# Patient Record
Sex: FEMALE | Race: WHITE | NOT HISPANIC OR LATINO | Employment: OTHER | URBAN - METROPOLITAN AREA
[De-identification: names, ages, dates, MRNs, and addresses within clinical notes are randomized per-mention and may not be internally consistent; named-entity substitution may affect disease eponyms.]

---

## 2021-04-08 DIAGNOSIS — Z23 ENCOUNTER FOR IMMUNIZATION: ICD-10-CM

## 2023-05-24 ENCOUNTER — HOSPITAL ENCOUNTER (INPATIENT)
Facility: HOSPITAL | Age: 68
LOS: 6 days | Discharge: HOME HEALTH | DRG: 552 | End: 2023-05-31
Attending: EMERGENCY MEDICINE | Admitting: INTERNAL MEDICINE
Payer: MEDICARE

## 2023-05-24 DIAGNOSIS — R60.0 LOWER EXTREMITY EDEMA: ICD-10-CM

## 2023-05-24 DIAGNOSIS — N17.9 AKI (ACUTE KIDNEY INJURY) (HCC): ICD-10-CM

## 2023-05-24 DIAGNOSIS — Z98.1 STATUS POST CERVICAL SPINAL FUSION: ICD-10-CM

## 2023-05-24 DIAGNOSIS — N17.9 AKI (ACUTE KIDNEY INJURY) (HCC): Primary | ICD-10-CM

## 2023-05-24 DIAGNOSIS — R74.8 ELEVATED CK: ICD-10-CM

## 2023-05-24 DIAGNOSIS — M54.2 CERVICAL PAIN: ICD-10-CM

## 2023-05-24 DIAGNOSIS — R23.8 BULLAE: ICD-10-CM

## 2023-05-24 DIAGNOSIS — E11.29 TYPE 2 DIABETES MELLITUS WITH RENAL COMPLICATION (HCC): ICD-10-CM

## 2023-05-24 LAB
ALBUMIN SERPL BCP-MCNC: 2.9 G/DL (ref 3.5–5)
ALP SERPL-CCNC: 134 U/L (ref 46–116)
ALT SERPL W P-5'-P-CCNC: 41 U/L (ref 12–78)
ANION GAP SERPL CALCULATED.3IONS-SCNC: 4 MMOL/L (ref 4–13)
AST SERPL W P-5'-P-CCNC: 82 U/L (ref 5–45)
BASOPHILS # BLD AUTO: 0.03 THOUSANDS/ÂΜL (ref 0–0.1)
BASOPHILS NFR BLD AUTO: 0 % (ref 0–1)
BILIRUB SERPL-MCNC: 0.4 MG/DL (ref 0.2–1)
BUN SERPL-MCNC: 39 MG/DL (ref 5–25)
CALCIUM ALBUM COR SERPL-MCNC: 10.2 MG/DL (ref 8.3–10.1)
CALCIUM SERPL-MCNC: 9.3 MG/DL (ref 8.3–10.1)
CARDIAC TROPONIN I PNL SERPL HS: 5 NG/L
CHLORIDE SERPL-SCNC: 104 MMOL/L (ref 96–108)
CO2 SERPL-SCNC: 24 MMOL/L (ref 21–32)
CREAT SERPL-MCNC: 1.62 MG/DL (ref 0.6–1.3)
D DIMER PPP FEU-MCNC: 2.64 UG/ML FEU
EOSINOPHIL # BLD AUTO: 0.21 THOUSAND/ÂΜL (ref 0–0.61)
EOSINOPHIL NFR BLD AUTO: 3 % (ref 0–6)
ERYTHROCYTE [DISTWIDTH] IN BLOOD BY AUTOMATED COUNT: 15.4 % (ref 11.6–15.1)
GFR SERPL CREATININE-BSD FRML MDRD: 32 ML/MIN/1.73SQ M
GLUCOSE SERPL-MCNC: 147 MG/DL (ref 65–140)
HCT VFR BLD AUTO: 29.9 % (ref 34.8–46.1)
HGB BLD-MCNC: 9.3 G/DL (ref 11.5–15.4)
IMM GRANULOCYTES # BLD AUTO: 0.08 THOUSAND/UL (ref 0–0.2)
IMM GRANULOCYTES NFR BLD AUTO: 1 % (ref 0–2)
LYMPHOCYTES # BLD AUTO: 1.32 THOUSANDS/ÂΜL (ref 0.6–4.47)
LYMPHOCYTES NFR BLD AUTO: 16 % (ref 14–44)
MCH RBC QN AUTO: 29 PG (ref 26.8–34.3)
MCHC RBC AUTO-ENTMCNC: 31.1 G/DL (ref 31.4–37.4)
MCV RBC AUTO: 93 FL (ref 82–98)
MONOCYTES # BLD AUTO: 0.69 THOUSAND/ÂΜL (ref 0.17–1.22)
MONOCYTES NFR BLD AUTO: 9 % (ref 4–12)
NEUTROPHILS # BLD AUTO: 5.82 THOUSANDS/ÂΜL (ref 1.85–7.62)
NEUTS SEG NFR BLD AUTO: 71 % (ref 43–75)
NRBC BLD AUTO-RTO: 0 /100 WBCS
PLATELET # BLD AUTO: 256 THOUSANDS/UL (ref 149–390)
PMV BLD AUTO: 11.1 FL (ref 8.9–12.7)
POTASSIUM SERPL-SCNC: 3.9 MMOL/L (ref 3.5–5.3)
PROT SERPL-MCNC: 6.9 G/DL (ref 6.4–8.4)
RBC # BLD AUTO: 3.21 MILLION/UL (ref 3.81–5.12)
SODIUM SERPL-SCNC: 132 MMOL/L (ref 135–147)
WBC # BLD AUTO: 8.44 THOUSAND/UL (ref 4.31–10.16)

## 2023-05-24 PROCEDURE — 99285 EMERGENCY DEPT VISIT HI MDM: CPT

## 2023-05-24 PROCEDURE — 84484 ASSAY OF TROPONIN QUANT: CPT | Performed by: STUDENT IN AN ORGANIZED HEALTH CARE EDUCATION/TRAINING PROGRAM

## 2023-05-24 PROCEDURE — 82550 ASSAY OF CK (CPK): CPT | Performed by: STUDENT IN AN ORGANIZED HEALTH CARE EDUCATION/TRAINING PROGRAM

## 2023-05-24 PROCEDURE — 85379 FIBRIN DEGRADATION QUANT: CPT | Performed by: STUDENT IN AN ORGANIZED HEALTH CARE EDUCATION/TRAINING PROGRAM

## 2023-05-24 PROCEDURE — 36415 COLL VENOUS BLD VENIPUNCTURE: CPT | Performed by: STUDENT IN AN ORGANIZED HEALTH CARE EDUCATION/TRAINING PROGRAM

## 2023-05-24 PROCEDURE — 93005 ELECTROCARDIOGRAM TRACING: CPT

## 2023-05-24 PROCEDURE — 96375 TX/PRO/DX INJ NEW DRUG ADDON: CPT

## 2023-05-24 PROCEDURE — 85025 COMPLETE CBC W/AUTO DIFF WBC: CPT | Performed by: STUDENT IN AN ORGANIZED HEALTH CARE EDUCATION/TRAINING PROGRAM

## 2023-05-24 PROCEDURE — 80053 COMPREHEN METABOLIC PANEL: CPT | Performed by: STUDENT IN AN ORGANIZED HEALTH CARE EDUCATION/TRAINING PROGRAM

## 2023-05-24 PROCEDURE — 83880 ASSAY OF NATRIURETIC PEPTIDE: CPT | Performed by: STUDENT IN AN ORGANIZED HEALTH CARE EDUCATION/TRAINING PROGRAM

## 2023-05-24 RX ORDER — HYDROMORPHONE HCL/PF 1 MG/ML
0.5 SYRINGE (ML) INJECTION ONCE
Status: COMPLETED | OUTPATIENT
Start: 2023-05-24 | End: 2023-05-24

## 2023-05-24 RX ADMIN — HYDROMORPHONE HYDROCHLORIDE 0.5 MG: 1 INJECTION, SOLUTION INTRAMUSCULAR; INTRAVENOUS; SUBCUTANEOUS at 22:57

## 2023-05-25 ENCOUNTER — APPOINTMENT (INPATIENT)
Dept: RADIOLOGY | Facility: HOSPITAL | Age: 68
DRG: 552 | End: 2023-05-25
Payer: MEDICARE

## 2023-05-25 ENCOUNTER — APPOINTMENT (EMERGENCY)
Dept: RADIOLOGY | Facility: HOSPITAL | Age: 68
DRG: 552 | End: 2023-05-25
Payer: MEDICARE

## 2023-05-25 ENCOUNTER — APPOINTMENT (INPATIENT)
Dept: NON INVASIVE DIAGNOSTICS | Facility: HOSPITAL | Age: 68
DRG: 552 | End: 2023-05-25
Payer: MEDICARE

## 2023-05-25 PROBLEM — I25.10 CORONARY ARTERY DISEASE WITHOUT ANGINA PECTORIS: Status: ACTIVE | Noted: 2023-05-25

## 2023-05-25 PROBLEM — R00.0 TACHYCARDIA: Status: ACTIVE | Noted: 2023-05-25

## 2023-05-25 PROBLEM — K21.9 ACID REFLUX: Status: ACTIVE | Noted: 2023-05-25

## 2023-05-25 PROBLEM — R79.89 ELEVATED D-DIMER: Status: ACTIVE | Noted: 2023-05-25

## 2023-05-25 PROBLEM — E11.29 TYPE 2 DIABETES MELLITUS WITH RENAL COMPLICATION (HCC): Status: ACTIVE | Noted: 2023-05-25

## 2023-05-25 PROBLEM — M54.2 CERVICAL PAIN: Status: ACTIVE | Noted: 2023-05-25

## 2023-05-25 PROBLEM — R60.0 LOWER EXTREMITY EDEMA: Status: ACTIVE | Noted: 2023-05-25

## 2023-05-25 PROBLEM — D64.9 ANEMIA: Status: ACTIVE | Noted: 2023-05-25

## 2023-05-25 PROBLEM — E87.1 HYPONATREMIA: Status: ACTIVE | Noted: 2023-05-25

## 2023-05-25 PROBLEM — K59.00 CONSTIPATION: Status: ACTIVE | Noted: 2023-05-25

## 2023-05-25 PROBLEM — N17.9 AKI (ACUTE KIDNEY INJURY) (HCC): Status: ACTIVE | Noted: 2023-05-25

## 2023-05-25 PROBLEM — I25.2 HISTORY OF MI (MYOCARDIAL INFARCTION): Status: ACTIVE | Noted: 2023-05-25

## 2023-05-25 LAB
2HR DELTA HS TROPONIN: 0 NG/L
4HR DELTA HS TROPONIN: 1 NG/L
ANION GAP SERPL CALCULATED.3IONS-SCNC: 3 MMOL/L (ref 4–13)
ATRIAL RATE: 107 BPM
BNP SERPL-MCNC: 14 PG/ML (ref 0–100)
BUN SERPL-MCNC: 34 MG/DL (ref 5–25)
CALCIUM SERPL-MCNC: 9 MG/DL (ref 8.3–10.1)
CARDIAC TROPONIN I PNL SERPL HS: 5 NG/L
CARDIAC TROPONIN I PNL SERPL HS: 6 NG/L
CHLORIDE SERPL-SCNC: 107 MMOL/L (ref 96–108)
CK SERPL-CCNC: 1358 U/L (ref 26–192)
CO2 SERPL-SCNC: 22 MMOL/L (ref 21–32)
CREAT SERPL-MCNC: 1.33 MG/DL (ref 0.6–1.3)
GFR SERPL CREATININE-BSD FRML MDRD: 41 ML/MIN/1.73SQ M
GLUCOSE SERPL-MCNC: 124 MG/DL (ref 65–140)
GLUCOSE SERPL-MCNC: 138 MG/DL (ref 65–140)
GLUCOSE SERPL-MCNC: 141 MG/DL (ref 65–140)
GLUCOSE SERPL-MCNC: 145 MG/DL (ref 65–140)
GLUCOSE SERPL-MCNC: 149 MG/DL (ref 65–140)
P AXIS: 45 DEGREES
POTASSIUM SERPL-SCNC: 4.4 MMOL/L (ref 3.5–5.3)
PR INTERVAL: 152 MS
QRS AXIS: 31 DEGREES
QRSD INTERVAL: 60 MS
QT INTERVAL: 308 MS
QTC INTERVAL: 411 MS
SODIUM SERPL-SCNC: 132 MMOL/L (ref 135–147)
T WAVE AXIS: 53 DEGREES
VENTRICULAR RATE: 107 BPM

## 2023-05-25 PROCEDURE — 36415 COLL VENOUS BLD VENIPUNCTURE: CPT | Performed by: STUDENT IN AN ORGANIZED HEALTH CARE EDUCATION/TRAINING PROGRAM

## 2023-05-25 PROCEDURE — 96365 THER/PROPH/DIAG IV INF INIT: CPT

## 2023-05-25 PROCEDURE — 99223 1ST HOSP IP/OBS HIGH 75: CPT | Performed by: PHYSICIAN ASSISTANT

## 2023-05-25 PROCEDURE — G1004 CDSM NDSC: HCPCS

## 2023-05-25 PROCEDURE — 99223 1ST HOSP IP/OBS HIGH 75: CPT | Performed by: INTERNAL MEDICINE

## 2023-05-25 PROCEDURE — 82948 REAGENT STRIP/BLOOD GLUCOSE: CPT

## 2023-05-25 PROCEDURE — 80048 BASIC METABOLIC PNL TOTAL CA: CPT | Performed by: NURSE PRACTITIONER

## 2023-05-25 PROCEDURE — 96366 THER/PROPH/DIAG IV INF ADDON: CPT

## 2023-05-25 PROCEDURE — 84484 ASSAY OF TROPONIN QUANT: CPT | Performed by: STUDENT IN AN ORGANIZED HEALTH CARE EDUCATION/TRAINING PROGRAM

## 2023-05-25 PROCEDURE — 97167 OT EVAL HIGH COMPLEX 60 MIN: CPT

## 2023-05-25 PROCEDURE — 71275 CT ANGIOGRAPHY CHEST: CPT

## 2023-05-25 PROCEDURE — 93010 ELECTROCARDIOGRAM REPORT: CPT | Performed by: INTERNAL MEDICINE

## 2023-05-25 PROCEDURE — 93970 EXTREMITY STUDY: CPT

## 2023-05-25 PROCEDURE — 96376 TX/PRO/DX INJ SAME DRUG ADON: CPT

## 2023-05-25 PROCEDURE — NC001 PR NO CHARGE: Performed by: PHYSICIAN ASSISTANT

## 2023-05-25 PROCEDURE — 97163 PT EVAL HIGH COMPLEX 45 MIN: CPT

## 2023-05-25 PROCEDURE — 99232 SBSQ HOSP IP/OBS MODERATE 35: CPT | Performed by: INTERNAL MEDICINE

## 2023-05-25 PROCEDURE — 93970 EXTREMITY STUDY: CPT | Performed by: SURGERY

## 2023-05-25 PROCEDURE — 72040 X-RAY EXAM NECK SPINE 2-3 VW: CPT

## 2023-05-25 RX ORDER — HYDROMORPHONE HCL/PF 1 MG/ML
0.5 SYRINGE (ML) INJECTION EVERY 4 HOURS PRN
Status: DISCONTINUED | OUTPATIENT
Start: 2023-05-25 | End: 2023-05-25

## 2023-05-25 RX ORDER — GLIMEPIRIDE 4 MG/1
8 TABLET ORAL
COMMUNITY

## 2023-05-25 RX ORDER — ATORVASTATIN CALCIUM 40 MG/1
40 TABLET, FILM COATED ORAL DAILY
COMMUNITY

## 2023-05-25 RX ORDER — HYDROMORPHONE HYDROCHLORIDE 2 MG/1
4 TABLET ORAL EVERY 4 HOURS PRN
Status: DISCONTINUED | OUTPATIENT
Start: 2023-05-25 | End: 2023-05-31 | Stop reason: HOSPADM

## 2023-05-25 RX ORDER — ISOSORBIDE MONONITRATE 30 MG/1
30 TABLET, EXTENDED RELEASE ORAL DAILY
COMMUNITY

## 2023-05-25 RX ORDER — BACLOFEN 10 MG/1
10 TABLET ORAL 2 TIMES DAILY PRN
COMMUNITY
End: 2023-05-31

## 2023-05-25 RX ORDER — DOCUSATE SODIUM 100 MG/1
100 CAPSULE, LIQUID FILLED ORAL 2 TIMES DAILY
Status: DISCONTINUED | OUTPATIENT
Start: 2023-05-25 | End: 2023-05-31 | Stop reason: HOSPADM

## 2023-05-25 RX ORDER — ASPIRIN 81 MG/1
81 TABLET, CHEWABLE ORAL DAILY
COMMUNITY
End: 2023-05-31

## 2023-05-25 RX ORDER — PIOGLITAZONEHYDROCHLORIDE 30 MG/1
30 TABLET ORAL DAILY
COMMUNITY

## 2023-05-25 RX ORDER — ATORVASTATIN CALCIUM 40 MG/1
40 TABLET, FILM COATED ORAL DAILY
Status: DISCONTINUED | OUTPATIENT
Start: 2023-05-25 | End: 2023-05-31 | Stop reason: HOSPADM

## 2023-05-25 RX ORDER — GABAPENTIN 100 MG/1
100 CAPSULE ORAL 3 TIMES DAILY
Status: DISCONTINUED | OUTPATIENT
Start: 2023-05-25 | End: 2023-05-30

## 2023-05-25 RX ORDER — HYDROMORPHONE HCL/PF 1 MG/ML
0.5 SYRINGE (ML) INJECTION ONCE
Status: COMPLETED | OUTPATIENT
Start: 2023-05-25 | End: 2023-05-25

## 2023-05-25 RX ORDER — OXYBUTYNIN CHLORIDE 5 MG/1
5 TABLET ORAL 2 TIMES DAILY
COMMUNITY

## 2023-05-25 RX ORDER — HYDROMORPHONE HYDROCHLORIDE 2 MG/1
2 TABLET ORAL EVERY 4 HOURS PRN
Status: DISCONTINUED | OUTPATIENT
Start: 2023-05-25 | End: 2023-05-31 | Stop reason: HOSPADM

## 2023-05-25 RX ORDER — ACETAMINOPHEN 325 MG/1
975 TABLET ORAL EVERY 8 HOURS SCHEDULED
Status: DISCONTINUED | OUTPATIENT
Start: 2023-05-25 | End: 2023-05-31 | Stop reason: HOSPADM

## 2023-05-25 RX ORDER — INSULIN LISPRO 100 [IU]/ML
2-12 INJECTION, SOLUTION INTRAVENOUS; SUBCUTANEOUS
Status: DISCONTINUED | OUTPATIENT
Start: 2023-05-25 | End: 2023-05-31 | Stop reason: HOSPADM

## 2023-05-25 RX ORDER — SODIUM CHLORIDE, SODIUM GLUCONATE, SODIUM ACETATE, POTASSIUM CHLORIDE, MAGNESIUM CHLORIDE, SODIUM PHOSPHATE, DIBASIC, AND POTASSIUM PHOSPHATE .53; .5; .37; .037; .03; .012; .00082 G/100ML; G/100ML; G/100ML; G/100ML; G/100ML; G/100ML; G/100ML
75 INJECTION, SOLUTION INTRAVENOUS CONTINUOUS
Status: DISCONTINUED | OUTPATIENT
Start: 2023-05-25 | End: 2023-05-26

## 2023-05-25 RX ORDER — HYDROMORPHONE HCL/PF 1 MG/ML
0.5 SYRINGE (ML) INJECTION EVERY 2 HOUR PRN
Status: DISCONTINUED | OUTPATIENT
Start: 2023-05-25 | End: 2023-05-26

## 2023-05-25 RX ORDER — LIDOCAINE 50 MG/G
1 PATCH TOPICAL DAILY
Status: DISCONTINUED | OUTPATIENT
Start: 2023-05-25 | End: 2023-05-31 | Stop reason: HOSPADM

## 2023-05-25 RX ORDER — HYDROMORPHONE HCL/PF 1 MG/ML
1 SYRINGE (ML) INJECTION ONCE AS NEEDED
Status: DISCONTINUED | OUTPATIENT
Start: 2023-05-25 | End: 2023-05-25

## 2023-05-25 RX ORDER — ALLOPURINOL 100 MG/1
200 TABLET ORAL DAILY
Status: DISCONTINUED | OUTPATIENT
Start: 2023-05-25 | End: 2023-05-31 | Stop reason: HOSPADM

## 2023-05-25 RX ORDER — LISINOPRIL 2.5 MG/1
2.5 TABLET ORAL DAILY
COMMUNITY

## 2023-05-25 RX ORDER — PANTOPRAZOLE SODIUM 40 MG/1
40 TABLET, DELAYED RELEASE ORAL DAILY
Status: DISCONTINUED | OUTPATIENT
Start: 2023-05-25 | End: 2023-05-31 | Stop reason: HOSPADM

## 2023-05-25 RX ORDER — POLYETHYLENE GLYCOL 3350 17 G/17G
17 POWDER, FOR SOLUTION ORAL DAILY
Status: DISCONTINUED | OUTPATIENT
Start: 2023-05-25 | End: 2023-05-31 | Stop reason: HOSPADM

## 2023-05-25 RX ORDER — HYDROMORPHONE HCL/PF 1 MG/ML
1 SYRINGE (ML) INJECTION ONCE
Status: COMPLETED | OUTPATIENT
Start: 2023-05-25 | End: 2023-05-25

## 2023-05-25 RX ORDER — ALLOPURINOL 100 MG/1
200 TABLET ORAL DAILY
COMMUNITY

## 2023-05-25 RX ORDER — OXYBUTYNIN CHLORIDE 5 MG/1
5 TABLET ORAL 2 TIMES DAILY
Status: DISCONTINUED | OUTPATIENT
Start: 2023-05-25 | End: 2023-05-31 | Stop reason: HOSPADM

## 2023-05-25 RX ORDER — ISOSORBIDE MONONITRATE 30 MG/1
30 TABLET, EXTENDED RELEASE ORAL DAILY
Status: DISCONTINUED | OUTPATIENT
Start: 2023-05-25 | End: 2023-05-31 | Stop reason: HOSPADM

## 2023-05-25 RX ORDER — SODIUM CHLORIDE, SODIUM GLUCONATE, SODIUM ACETATE, POTASSIUM CHLORIDE, MAGNESIUM CHLORIDE, SODIUM PHOSPHATE, DIBASIC, AND POTASSIUM PHOSPHATE .53; .5; .37; .037; .03; .012; .00082 G/100ML; G/100ML; G/100ML; G/100ML; G/100ML; G/100ML; G/100ML
500 INJECTION, SOLUTION INTRAVENOUS ONCE
Status: COMPLETED | OUTPATIENT
Start: 2023-05-25 | End: 2023-05-25

## 2023-05-25 RX ORDER — METHOCARBAMOL 500 MG/1
500 TABLET, FILM COATED ORAL EVERY 6 HOURS SCHEDULED
Status: DISCONTINUED | OUTPATIENT
Start: 2023-05-25 | End: 2023-05-31 | Stop reason: HOSPADM

## 2023-05-25 RX ORDER — PANTOPRAZOLE SODIUM 40 MG/1
40 TABLET, DELAYED RELEASE ORAL DAILY
COMMUNITY

## 2023-05-25 RX ADMIN — GABAPENTIN 100 MG: 100 CAPSULE ORAL at 11:33

## 2023-05-25 RX ADMIN — HYDROMORPHONE HYDROCHLORIDE 1 MG: 1 INJECTION, SOLUTION INTRAMUSCULAR; INTRAVENOUS; SUBCUTANEOUS at 05:45

## 2023-05-25 RX ADMIN — LIDOCAINE 5% 1 PATCH: 700 PATCH TOPICAL at 11:34

## 2023-05-25 RX ADMIN — ACETAMINOPHEN 975 MG: 325 TABLET ORAL at 22:05

## 2023-05-25 RX ADMIN — ATORVASTATIN CALCIUM 40 MG: 40 TABLET, FILM COATED ORAL at 17:20

## 2023-05-25 RX ADMIN — HYDROMORPHONE HYDROCHLORIDE 4 MG: 2 TABLET ORAL at 22:42

## 2023-05-25 RX ADMIN — OXYBUTYNIN CHLORIDE 5 MG: 5 TABLET ORAL at 17:20

## 2023-05-25 RX ADMIN — POLYETHYLENE GLYCOL 3350 17 G: 17 POWDER, FOR SOLUTION ORAL at 09:32

## 2023-05-25 RX ADMIN — ACETAMINOPHEN 975 MG: 325 TABLET ORAL at 14:44

## 2023-05-25 RX ADMIN — GABAPENTIN 100 MG: 100 CAPSULE ORAL at 22:05

## 2023-05-25 RX ADMIN — DOCUSATE SODIUM 100 MG: 100 CAPSULE, LIQUID FILLED ORAL at 17:20

## 2023-05-25 RX ADMIN — DOCUSATE SODIUM 100 MG: 100 CAPSULE, LIQUID FILLED ORAL at 09:38

## 2023-05-25 RX ADMIN — HYDROMORPHONE HYDROCHLORIDE 0.5 MG: 1 INJECTION, SOLUTION INTRAMUSCULAR; INTRAVENOUS; SUBCUTANEOUS at 02:25

## 2023-05-25 RX ADMIN — METHOCARBAMOL TABLETS 500 MG: 500 TABLET, COATED ORAL at 23:01

## 2023-05-25 RX ADMIN — METHOCARBAMOL TABLETS 500 MG: 500 TABLET, COATED ORAL at 17:20

## 2023-05-25 RX ADMIN — IOHEXOL 90 ML: 350 INJECTION, SOLUTION INTRAVENOUS at 01:20

## 2023-05-25 RX ADMIN — METHOCARBAMOL TABLETS 500 MG: 500 TABLET, COATED ORAL at 11:34

## 2023-05-25 RX ADMIN — OXYBUTYNIN CHLORIDE 5 MG: 5 TABLET ORAL at 09:39

## 2023-05-25 RX ADMIN — ALLOPURINOL 200 MG: 100 TABLET ORAL at 17:20

## 2023-05-25 RX ADMIN — SODIUM CHLORIDE, SODIUM GLUCONATE, SODIUM ACETATE, POTASSIUM CHLORIDE, MAGNESIUM CHLORIDE, SODIUM PHOSPHATE, DIBASIC, AND POTASSIUM PHOSPHATE 75 ML/HR: .53; .5; .37; .037; .03; .012; .00082 INJECTION, SOLUTION INTRAVENOUS at 09:34

## 2023-05-25 RX ADMIN — SODIUM CHLORIDE, SODIUM GLUCONATE, SODIUM ACETATE, POTASSIUM CHLORIDE, MAGNESIUM CHLORIDE, SODIUM PHOSPHATE, DIBASIC, AND POTASSIUM PHOSPHATE 500 ML: .53; .5; .37; .037; .03; .012; .00082 INJECTION, SOLUTION INTRAVENOUS at 00:09

## 2023-05-25 RX ADMIN — SODIUM CHLORIDE, SODIUM GLUCONATE, SODIUM ACETATE, POTASSIUM CHLORIDE, MAGNESIUM CHLORIDE, SODIUM PHOSPHATE, DIBASIC, AND POTASSIUM PHOSPHATE 500 ML: .53; .5; .37; .037; .03; .012; .00082 INJECTION, SOLUTION INTRAVENOUS at 00:50

## 2023-05-25 RX ADMIN — METOPROLOL TARTRATE 25 MG: 25 TABLET, FILM COATED ORAL at 22:05

## 2023-05-25 RX ADMIN — PANTOPRAZOLE SODIUM 40 MG: 40 TABLET, DELAYED RELEASE ORAL at 09:38

## 2023-05-25 RX ADMIN — ISOSORBIDE MONONITRATE 30 MG: 30 TABLET, EXTENDED RELEASE ORAL at 09:38

## 2023-05-25 RX ADMIN — GABAPENTIN 100 MG: 100 CAPSULE ORAL at 17:20

## 2023-05-25 RX ADMIN — METOPROLOL TARTRATE 25 MG: 25 TABLET, FILM COATED ORAL at 09:38

## 2023-05-25 RX ADMIN — HYDROMORPHONE HYDROCHLORIDE 4 MG: 2 TABLET ORAL at 09:39

## 2023-05-25 RX ADMIN — HYDROMORPHONE HYDROCHLORIDE 0.5 MG: 1 INJECTION, SOLUTION INTRAMUSCULAR; INTRAVENOUS; SUBCUTANEOUS at 11:34

## 2023-05-25 NOTE — PLAN OF CARE
Problem: Potential for Falls  Goal: Patient will remain free of falls  Description: INTERVENTIONS:  - Educate patient/family on patient safety including physical limitations  - Instruct patient to call for assistance with activity   - Consult OT/PT to assist with strengthening/mobility   - Keep Call bell within reach  - Keep bed low and locked with side rails adjusted as appropriate  - Keep care items and personal belongings within reach  - Initiate and maintain comfort rounds  - Make Fall Risk Sign visible to staff  - Offer Toileting every *2** Hours, in advance of need  - Initiate/Maintain **bed/chair*alarm  - Obtain necessary fall risk management equipment:  - Apply yellow socks and bracelet for high fall risk patients  - Consider moving patient to room near nurses station  Outcome: Progressing     Problem: NEUROSENSORY - ADULT  Goal: Achieves maximal functionality and self care  Description: INTERVENTIONS  - Monitor swallowing and airway patency with patient fatigue and changes in neurological status  - Encourage and assist patient to increase activity and self care     - Encourage visually impaired, hearing impaired and aphasic patients to use assistive/communication devices  Outcome: Progressing     Problem: CARDIOVASCULAR - ADULT  Goal: Absence of cardiac dysrhythmias or at baseline rhythm  Description: INTERVENTIONS:  - Continuous cardiac monitoring, vital signs, obtain 12 lead EKG if ordered  - Administer antiarrhythmic and heart rate control medications as ordered  - Monitor electrolytes and administer replacement therapy as ordered  Outcome: Progressing     Problem: METABOLIC, FLUID AND ELECTROLYTES - ADULT  Goal: Electrolytes maintained within normal limits  Description: INTERVENTIONS:  - Monitor labs and assess patient for signs and symptoms of electrolyte imbalances  - Administer electrolyte replacement as ordered  - Monitor response to electrolyte replacements, including repeat lab results as appropriate  - Instruct patient on fluid and nutrition as appropriate  Outcome: Progressing  Goal: Fluid balance maintained  Description: INTERVENTIONS:  - Monitor labs   - Monitor I/O and WT  - Instruct patient on fluid and nutrition as appropriate  - Assess for signs & symptoms of volume excess or deficit  Outcome: Progressing  Goal: Glucose maintained within target range  Description: INTERVENTIONS:  - Monitor Blood Glucose as ordered  - Assess for signs and symptoms of hyperglycemia and hypoglycemia  - Administer ordered medications to maintain glucose within target range  - Assess nutritional intake and initiate nutrition service referral as needed  Outcome: Progressing     Problem: HEMATOLOGIC - ADULT  Goal: Maintains hematologic stability  Description: INTERVENTIONS  - Assess for signs and symptoms of bleeding or hemorrhage  - Monitor labs  - Administer supportive blood products/factors as ordered and appropriate  Outcome: Progressing     Problem: MUSCULOSKELETAL - ADULT  Goal: Maintain or return mobility to safest level of function  Description: INTERVENTIONS:  - Assess patient's ability to carry out ADLs; assess patient's baseline for ADL function and identify physical deficits which impact ability to perform ADLs (bathing, care of mouth/teeth, toileting, grooming, dressing, etc )  - Assess/evaluate cause of self-care deficits   - Assess range of motion  - Assess patient's mobility  - Assess patient's need for assistive devices and provide as appropriate  - Encourage maximum independence but intervene and supervise when necessary  - Involve family in performance of ADLs  - Assess for home care needs following discharge   - Consider OT consult to assist with ADL evaluation and planning for discharge  - Provide patient education as appropriate  Outcome: Progressing  Goal: Maintain proper alignment of affected body part  Description: INTERVENTIONS:  - Support, maintain and protect limb and body alignment  - Provide patient/ family with appropriate education  Outcome: Progressing     Problem: SKIN/TISSUE INTEGRITY - ADULT  Goal: Skin Integrity remains intact(Skin Breakdown Prevention)  Description: Assess:  -Perform Miguel assessment -Clean and moisturize skin -Inspect skin when repositioning, toileting, and assisting with ADLS  -Assess under medical devices -Assess extremities for adequate circulation and sensation     Bed Management:  -Have minimal linens on bed & keep smooth, unwrinkled  -Change linens as needed when moist or perspiring  -Avoid sitting or lying in one position for more than *2** hours while in bed  -Keep HOB at *30**degrees     Toileting:  -Offer bedside commode  -Assess for incontinence   -Use incontinent care products after each incontinent episode     Activity:  -Mobilize patient *3** times a day  -Encourage activity and walks on unit  -Encourage or provide ROM exercises   -Turn and reposition patient every *2** Hours  -Use appropriate equipment to lift or move patient in bed  -Instruct/ Assist with weight shifting every *20 mins** when out of bed in chair  -Consider limitation of chair time *2** hour intervals    Skin Care:  -Avoid use of baby powder, tape, friction and shearing, hot water or constrictive clothing  -Relieve pressure over bony prominences -Do not massage red bony areas    Next Steps:  -Teach patient strategies to minimize risks-Consider consults to  interdisciplinary teams Outcome: Progressing  Goal: Incision(s), wounds(s) or drain site(s) healing without S/S of infection  Description: INTERVENTIONS  - Assess and document dressing, incision, wound bed, drain sites and surrounding tissue  - Provide patient and family education  - Perform skin care/dressing changes Outcome: Progressing     Problem: PAIN - ADULT  Goal: Verbalizes/displays adequate comfort level or baseline comfort level  Description: Interventions:  - Encourage patient to monitor pain and request assistance  - Assess pain using appropriate pain scale  - Administer analgesics based on type and severity of pain and evaluate response  - Implement non-pharmacological measures as appropriate and evaluate response  - Consider cultural and social influences on pain and pain management  - Notify physician/advanced practitioner if interventions unsuccessful or patient reports new pain  Outcome: Progressing     Problem: INFECTION - ADULT  Goal: Absence or prevention of progression during hospitalization  Description: INTERVENTIONS:  - Assess and monitor for signs and symptoms of infection  - Monitor lab/diagnostic results  - Monitor all insertion sites, i e  indwelling lines, tubes, and drains  - Monitor endotracheal if appropriate and nasal secretions for changes in amount and color  - [place] appropriate cooling/warming therapies per order  - Administer medications as ordered  - Instruct and encourage patient and family to use good hand hygiene technique  - Identify and instruct in appropriate isolation precautions for identified infection/condition  Outcome: Progressing  Goal: Absence of fever/infection during neutropenic period  Description: INTERVENTIONS:  - Monitor WBC    Outcome: Progressing     Problem: SAFETY ADULT  Goal: Patient will remain free of falls  Description: INTERVENTIONS:  - Educate patient/family on patient safety including physical limitations  - Instruct patient to call for assistance with activity   - Consult OT/PT to assist with strengthening/mobility   - Keep Call bell within reach  - Keep bed low and locked with side rails adjusted as appropriate  - Keep care items and personal belongings within reach  - Initiate and maintain comfort rounds  - Make Fall Risk Sign visible to staff  - Offer Toileting every *2** Hours, in advance of need  - Initiate/Maintain *bed/chair**alarm  - Obtain necessary fall risk management equipment:   - Apply yellow socks and bracelet for high fall risk patients  - Consider moving patient to room near nurses station  Outcome: Progressing  Goal: Maintain or return to baseline ADL function  Description: INTERVENTIONS:  -  Assess patient's ability to carry out ADLs; assess patient's baseline for ADL function and identify physical deficits which impact ability to perform ADLs (bathing, care of mouth/teeth, toileting, grooming, dressing, etc )  - Assess/evaluate cause of self-care deficits   - Assess range of motion  - Assess patient's mobility; develop plan if impaired  - Assess patient's need for assistive devices and provide as appropriate  - Encourage maximum independence but intervene and supervise when necessary  - Involve family in performance of ADLs  - Assess for home care needs following discharge   - Consider OT consult to assist with ADL evaluation and planning for discharge  - Provide patient education as appropriate  Outcome: Progressing  Goal: Maintains/Returns to pre admission functional level  Description: INTERVENTIONS:  - Perform BMAT or MOVE assessment daily    - Set and communicate daily mobility goal to care team and patient/family/caregiver  - Collaborate with rehabilitation services on mobility goals if consulted  - Perform Range of Motion *3** times a day  - Reposition patient every *2** hours    - Dangle patient *3** times a day  - Stand patient **3* times a day  - Ambulate patient *3** times a day  - Out of bed to chair **3* times a day   - Out of bed for meals **3* times a day  - Out of bed for toileting  - Record patient progress and toleration of activity level   Outcome: Progressing     Problem: DISCHARGE PLANNING  Goal: Discharge to home or other facility with appropriate resources  Description: INTERVENTIONS:  - Identify barriers to discharge w/patient and caregiver  - Arrange for needed discharge resources and transportation as appropriate  - Identify discharge learning needs (meds, wound care, etc )  - Arrange for interpretive services to assist at discharge as needed  - Refer to Case Management Department for coordinating discharge planning if the patient needs post-hospital services based on physician/advanced practitioner order or complex needs related to functional status, cognitive ability, or social support system  Outcome: Progressing     Problem: Knowledge Deficit  Goal: Patient/family/caregiver demonstrates understanding of disease process, treatment plan, medications, and discharge instructions  Description: Complete learning assessment and assess knowledge base    Interventions:  - Provide teaching at level of understanding  - Provide teaching via preferred learning methods  Outcome: Progressing

## 2023-05-25 NOTE — ASSESSMENT & PLAN NOTE
In setting of CKD 3 , Suspect acute kidney injury due to dehydration/medications   · Creatinine 1 62 last noted creatinine 5 days ago at [facility] was 1 27   · Pt is sp ct scan with iv contrast   · Would administer iv fluids overnight   · Strict I's and O's patient reports voiding daily over the last few days  · Bladder scan chk pvr continue oxybutynin 5 mg twice a day  · Avoid nephrotoxic medications   · Avoid hypotension   · Corrected calcium 10 2 suspect some component of dehydration   · Hold metformin status post CT with contrast glimepiride and pioglitazone  · Continue allopurinol 200 mg daily

## 2023-05-25 NOTE — ASSESSMENT & PLAN NOTE
Patient reports MI history, in 2010 with 1 stent placement  · Continue atorvastatin 40 mg daily  · Continue isosorbide mononitrate ER, 30 mg daily  · Currently holding aspirin 81 mg daily, status post surgery

## 2023-05-25 NOTE — CONSULTS
[facility]  Consult note  Name: Noam Heart I  MRN: [id number]  Unit/Bed#: [facility] 382-52 I Date of Admission: 5/24/2023   Date of Service: 5/25/2023 I Hospital Day: 0    Assessment/Plan   Chronic kidney disease stage III with PHYLLIS  Assessment & Plan  • CrCl cannot be calculated (Unknown ideal weight  )  • Will attempt to minimize renally excreted pain medications  * Cervical pain  Assessment & Plan  · Continue Tylenol 975 mg p o  every 8 hours scheduled  · Add gabapentin 100 mg p o  3 times daily scheduled  · Add lidocaine patch, on for 12 hours and off for 12 hours  · Add Robaxin 500 mg p o  every 6 hours scheduled  · Continue Dilaudid 2 mg p o  every 4 hours as needed moderate pain or 4 mg p o  every 4 hours as needed severe pain  · Change Dilaudid to 0 5 mg IV every 2 hours as needed breakthrough pain  · We will give a one-time dose of IV Dilaudid 0 5 mg x 1 now  · Ice or moist heat to painful area for up to 20 minutes every hour as needed  · Do not use heat over site of lidocaine patch  · Continue bowel regimen to avoid opioid-induced constipation while on opioid pain medication  Noam Heart is a 78 y o  female with recent PCDF now with increased neck pain  APS will continue to follow  Please contact Acute Pain Service [facility] via [employer] from [phone] with additional questions or concerns  See Hillary or Dimitri for additional contacts and after hours information  History of Present Illness    Admit Date:  5/24/2023  Hospital Day:  0 days  Primary Service:  Hospitalist  Attending Provider:  Nando Salazar DO  Reason for Consult / Principal Problem: Neck pain  HPI: Noam Heart is a 78 y o  female who presents with rapid onset of severe posterior neck pain and upper midline back pain  Patient underwent PC DF at [facility]  in [place] on 5/18/2023  Patient was discharged on 5/20/2023 to home    Patient states that shortly after arriving home she developed rapidly worsening posterior cervical pain as well as midline upper back pain  She denies any trauma or other inciting events and states that she had worn her collar at all times as instructed  Patient admits to right greater than left lower extremity weakness and edema as well  Patient was evaluated in the ER at [facility] on 5/24/2023 and was given IV Dilaudid with improvement in her pain for a short time  Currently, patient complains of significant posterior midline neck pain and midline upper back pain which is worse with movement and improves somewhat with rest   Has continued to get relief from IV Dilaudid and only recently received a dose of oral Dilaudid 4 mg  Since receiving the oral Dilaudid within the last hour she was evaluated by another provider which caused her to move around the bed increasing her pain  Patient states that she was on Vicodin while an inpatient at [facility] and was discharged on Vicodin, however was transitioned to oral Dilaudid 10 mg p o  every 4 hours as needed pain shortly after arrival home as the Vicodin was ineffective  Patient was also prescribed tizanidine which she states also provided no relief  Current pain location(s): Midline posterior neck, midline upper back  Pain Scale:   8-10 out of 10  Quality: Sharp, aching  Current Analgesic regimen:  Tylenol 975 mg p o  every 8 hours scheduled  Dilaudid 2 mg p o  every 4 hours as needed moderate pain  Dilaudid 4 mg p o  every 4 hours as needed severe pain  Dilaudid 0 5 mg IV every 4 hours as needed breakthrough pain  Pain History: Chronic neck and back pain secondary to osteoarthritis  Pain Management Provider: None    I have reviewed the patient's controlled substance dispensing history in the Prescription Drug Monitoring Program in compliance with the [facility] regulations before prescribing any controlled substances       Past 1 year review of PDMP:  Filled  Written Sold  ID  Drug  QTY  Days  Prescriber  RX #  Dispenser  Refill  Daily Dose*  Pymt Type       05/22/2023 05/22/2023   1  Oxycodone Hcl (Ir) 5 Mg Tablet 30 00  3  Gr Oz  [id number]   Pen (9018)   75 00 MME  [place]     05/20/2023 05/20/2023   1  Hydrocodone-Acetamin  Mg 20 00  5  Ki Can  [id number]   Pen (4756)   40 00 [place]ME  [place]     05/02/2023 05/02/2023   1  Tramadol Hcl 50 Mg Tablet 40 00  7  Gr Oz  [id number]   Pen (0868)   28 57 MME  [place]          Consults    Review of Systems   Cardiovascular: Positive for leg swelling  Musculoskeletal: Positive for back pain, gait problem and neck pain  Neurological: Positive for weakness (Right greater than left lower extremity)  All other systems reviewed and are negative        Historical Information   Past Medical History:   Diagnosis Date   • Diabetes type 2    • MI (myocardial infarction) ([facility] [address] )    • Osteoarthritis      Past Surgical History:   Procedure Laterality Date   • BACK SURGERY     • CORONARY ANGIOPLASTY WITH STENT PLACEMENT N/A    • LAMINECTOMY     • MINIMALLY INVASIVE LUMBAR DECOMPRESSION       Social History   Social History     Substance and Sexual Activity   Alcohol Use None     Social History     Substance and Sexual Activity   Drug Use Not on file     Social History     Tobacco Use   Smoking Status Never   Smokeless Tobacco Never     Family History:   Family History   Problem Relation Age of Onset   • Diabetes Mother    • Heart attack Father    • Hodgkin's lymphoma Father         non hodgkins lymphoma   • Other Father         mesothelioma       Meds/Allergies   all current active meds have been reviewed, current meds:   Current Facility-Administered Medications   Medication Dose Route Frequency   • acetaminophen (TYLENOL) tablet 975 mg  975 mg Oral Q8H [facility]   • allopurinol (ZYLOPRIM) tablet 200 mg  200 mg Oral Daily   • atorvastatin (LIPITOR) tablet 40 mg  40 mg Oral Daily   • docusate sodium (COLACE) capsule 100 mg  100 mg Oral BID   • gabapentin (NEURONTIN) capsule 100 mg  100 mg Oral TID   • HYDROmorphone (DILAUDID) injection 0 5 mg  0 5 mg Intravenous Q2H PRN   • HYDROmorphone (DILAUDID) injection 0 5 mg  0 5 mg Intravenous Once   • HYDROmorphone (DILAUDID) tablet 2 mg  2 mg Oral Q4H PRN   • HYDROmorphone (DILAUDID) tablet 4 mg  4 mg Oral Q4H PRN   • insulin lispro (HumaLOG) 100 units/mL subcutaneous injection 2-12 Units  2-12 Units Subcutaneous TID AC   • insulin lispro (HumaLOG) 100 units/mL subcutaneous injection 2-12 Units  2-12 Units Subcutaneous HS   • isosorbide mononitrate (IMDUR) 24 hr tablet 30 mg  30 mg Oral Daily   • lidocaine (LIDODERM) 5 % patch 1 patch  1 patch Topical Daily   • methocarbamol (ROBAXIN) tablet 500 mg  500 mg Oral Q6H [address]   • metoprolol tartrate (LOPRESSOR) tablet 25 mg  25 mg Oral Q12H KATHERYN   • multi-electrolyte (PLASMALYTE-A/ISOLYTE-S PH 7 4) IV solution  75 mL/hr Intravenous Continuous   • oxybutynin (DITROPAN) tablet 5 mg  5 mg Oral BID   • pantoprazole (PROTONIX) EC tablet 40 mg  40 mg Oral Daily   • polyethylene glycol (MIRALAX) packet 17 g  17 g Oral Daily    and PTA meds:   Prior to Admission Medications   Prescriptions Last Dose Informant Patient Reported?  Taking?   allopurinol (ZYLOPRIM) 100 mg tablet   Yes Yes   Sig: Take 200 mg by mouth daily   aspirin 81 mg chewable tablet   Yes Yes   Sig: Chew 81 mg daily   atorvastatin (LIPITOR) 40 mg tablet   Yes Yes   Sig: Take 40 mg by mouth daily   baclofen 10 mg tablet   Yes Yes   Sig: Take 10 mg by mouth 2 (two) times a day as needed for muscle spasms   glimepiride (AMARYL) 4 mg tablet   Yes Yes   Sig: Take 8 mg by mouth every morning before breakfast   isosorbide mononitrate (IMDUR) 30 mg 24 hr tablet   Yes Yes   Sig: Take 30 mg by mouth daily   lisinopril (ZESTRIL) 2 5 mg tablet   Yes Yes   Sig: Take 2 5 mg by mouth daily   metFORMIN (GLUCOPHAGE) 1000 MG tablet   Yes Yes   Sig: Take 1,000 mg by mouth daily with dinner   metFORMIN (GLUCOPHAGE) 500 mg tablet   Yes Yes   Sig: Take 500 mg by mouth daily with breakfast   metoprolol tartrate (LOPRESSOR) 25 mg tablet   Yes Yes   Sig: Take 25 mg by mouth every 12 (twelve) hours   oxybutynin (DITROPAN) 5 mg tablet   Yes Yes   Sig: Take 5 mg by mouth 2 (two) times a day   pantoprazole (PROTONIX) 40 mg tablet   Yes Yes   Sig: Take 40 mg by mouth daily   pioglitazone (ACTOS) 30 mg tablet   Yes Yes   Sig: Take 30 mg by mouth daily      Facility-Administered Medications: None       Allergies   Allergen Reactions   • Latex Rash       Objective   Temp:  [98 7 °F (37 1 °C)-99 6 °F (37 6 °C)] 99 6 °F (37 6 °C)  HR:  [] 116  Resp:  [13-19] 16  BP: (101-144)/(56-85) 133/75    Intake/Output Summary (Last 24 hours) at 5/25/2023 1114  Last data filed at 5/25/2023 0148  Gross per 24 hour   Intake 1120 ml   Output --   Net 1120 ml       Physical Exam  Vitals and nursing note reviewed  Constitutional:       General: She is awake  She is not in acute distress  Appearance: She is not ill-appearing, toxic-appearing or diaphoretic  Interventions: Cervical collar in place  Comments: Appears uncomfortable  Cardiovascular:      Rate and Rhythm: Normal rate and regular rhythm  Musculoskeletal:      Thoracic back: Tenderness and bony tenderness present  No deformity  Comments: Cervical collar precludes examination of cervical spine  Skin:     General: Skin is warm and dry  Neurological:      Mental Status: She is alert and oriented to person, place, and time  GCS: GCS eye subscore is 4  GCS verbal subscore is 5  GCS motor subscore is 6  Psychiatric:         Attention and Perception: Attention normal          Speech: Speech normal          Behavior: Behavior normal  Behavior is cooperative  Lab Results:   I have personally reviewed pertinent labs  , CBC:   Lab Results   Component Value Date    HCT 29 9 (L) 05/24/2023    HGB 9 3 (L) 05/24/2023    MCH 29 0 05/24/2023    MCHC 31 1 (L) 05/24/2023 "MCV 93 05/24/2023    MPV 11 1 05/24/2023    NRBC 0 05/24/2023     05/24/2023    RBC 3 21 (L) 05/24/2023    RDW 15 4 (H) 05/24/2023    WBC 8 44 05/24/2023   , CMP:   Lab Results   Component Value Date    ALKPHOS 134 (H) 05/24/2023    ALT 41 05/24/2023    AST 82 (H) 05/24/2023    BUN 39 (H) 05/24/2023    CALCIUM 9 3 05/24/2023     05/24/2023    CO2 24 05/24/2023    CREATININE 1 62 (H) 05/24/2023    EGFR 32 05/24/2023    K 3 9 05/24/2023    SODIUM 132 (L) 05/24/2023   , BMP:  Lab Results   Component Value Date    AGAP 4 05/24/2023    BUN 39 (H) 05/24/2023    CALCIUM 9 3 05/24/2023     05/24/2023    CO2 24 05/24/2023    CREATININE 1 62 (H) 05/24/2023    EGFR 32 05/24/2023    GLUC 147 (H) 05/24/2023    K 3 9 05/24/2023    SODIUM 132 (L) 05/24/2023   , PT/PTT:No results found for: \"PT\", \"PTT\" CrCl cannot be calculated (Unknown ideal weight )  Imaging Studies: I have personally reviewed pertinent reports  EKG, Pathology, and Other Studies: I have personally reviewed pertinent reports  Counseling / Coordination of Care  Greater than 50% of total time was spent with the patient and / or family counseling and / or coordination of care  A description of the counseling / coordination of care:  Patient interview, physical examination, review of PDMP, review of medical record, review of imaging and laboratory data, development of pain management plan, discussion of pain management plan with patient and primary service  Please note that the APS provides consultative services regarding pain management only  With the exception of ketamine and epidural infusions and except when indicated, final decisions regarding starting or changing doses of analgesic medications are at the discretion of the consulting service  Off hours consultation and/or medication management is generally not available      Lauren Kendall PA-C  Acute Pain Service    "

## 2023-05-25 NOTE — ASSESSMENT & PLAN NOTE
In the setting of post cervical surgery, and fear of using bowel regimen secondary to inability to wipe herself due to pain  · Bowel regimen  · Colace twice daily, MiraLAX daily

## 2023-05-25 NOTE — PROGRESS NOTES
[facility]  Progress Note  Name: Tere Rees  MRN: [id number]  Unit/Bed#: [facility] 180-57 I Date of Admission: 5/24/2023   Date of Service: 5/25/2023 I Hospital Day: 0          Bladder scan 507--unable to void  Will order protocol  Assessment/Plan   * Cervical pain  Assessment & Plan  Presents with cervical pain and b/l LE weakness s/p PCDF C3-T1 on 5/18/23 at [facility] with Dr Jazlyn Madera (discharged 5/20/23)  No dedicated cervical imaging obtained upon admission  · Neurosurgery consulted, input appreciated  · Ordered upright cervical xrays, pending  · C-collar in place; per surgeon should be worn at all times during first 2 weeks, after 2 weeks may remove at sleep only for the next 4 weeks  · After third day postop, patient should shower daily and cover incision daily for the first 14 days with a clean dressing  · APS consulted, input appreciated  · Continue Tylenol 975 mg p o  every 8 hours  · Added gabapentin 100 mg p o  3 times daily, added lidocaine patch, added Robaxin 500 mg p o  every 6 hours scheduled  · On admission her p o  oxy was changed to p o  Dilaudid, continue for now  · Increased IV Dilaudid to every 2 hours as needed  · Consideration for ketamine infusion  · Bowel regimen  · CK elevated of note  · PT/OT consults pending         Lower extremity edema  Assessment & Plan  Presents with increased LE edema with bullae bilateral lower extremities   Not on diuretics at baseline and no echo on file since 2012  · Check echocardiogram though BNP normal  · F/u LE venous duplex to r/o DVT  · Elevate lower extremities  · Wound care consulted     Hyponatremia  Assessment & Plan  Mild on admission, ?dehdyration given elevated BUN/Cr as well  · F/u repeat BMP s/p IVF    Anemia  Assessment & Plan  Hgb 9 3 on admission, potentially related to recent surgery  · monitor CBC    Elevated d-dimer  Assessment & Plan  CTA PE study negative and main pulmonary "artery, distal order branches were not evaluated due to respiratory motion artifact  · Lower extremity duplex pending    Tachycardia  Assessment & Plan  Sinus on admission EKG, suspect secondary to pain  · CTA negative for PE however distal branches not evaluated due to respiratory motion artifact   · Venous duplex pending  · Consideration for telemetry and further w/u if persists  · Control pain as above     Constipation  Assessment & Plan  2/2 surgical procedure  · Bowel regimen ordered    Acid reflux  Assessment & Plan  · Continue pantoprazole 40 mg daily    Coronary artery disease without angina pectoris  Assessment & Plan  Patient reports MI history, in 2010 with 1 stent placement  · Continue statin, BB, imdur  · Currently holding aspirin 81 mg daily, status post surgery--resume when cleared by her surgeon    Type 2 diabetes mellitus with renal complication [facility])  Assessment & Plan  No results found for: \"HGBA1C\"    Recent Labs     05/25/23  0848 05/25/23  1117   POCGLU 124 138       Blood Sugar Average: Last 72 hrs:  · Pt reports A1c of 6 0 on April 5th 2023  Despite well controlled DM, she reports chronic neuropathy   · Home meds held  (metformin 500 mg in the morning, 1000 mg in the evening, glimepiride 8 mg once a day,Pioglitazone hcl 30 mg daily)  · SSI/accuchecks  · Gabapentin initiated by APS   (P) 131      Chronic kidney disease stage III with PHYLLIS  Assessment & Plan  Baseline appears around 1 0-1 2; upon discharge on 5/20 was 1 27  Presented with creatinine 1 62   She received contrast for PE study in ER  · Has been on gentle IVF overnight, agree for now though caution with b/l LE edema  · Holding ACE-inhibitor  · Monitor ability to void--will check PVR   · AM BMP  · If creat worsens, consult renal              VTE Pharmacologic Prophylaxis: VTE Score: 7 not ordered given post operative state--defer to NSx if able     Patient Centered Rounds: I performed bedside rounds with nursing staff " today   Discussions with Specialists or Other Care Team Provider: soraya Jimenez d/w APS    Education and Discussions with Family / Patient: Updated  (friend) at bedside  Total Time Spent on Date of Encounter in care of patient: 35 minutes This time was spent on one or more of the following: performing physical exam; counseling and coordination of care; obtaining or reviewing history; documenting in the medical record; reviewing/ordering tests, medications or procedures; communicating with other healthcare professionals and discussing with patient's family/caregivers  Current Length of Stay: 0 day(s)  Current Patient Status: Inpatient   Certification Statement: The patient will continue to require additional inpatient hospital stay due to ongoing pain control   Discharge Plan: Anticipate discharge in >72 hrs to discharge location to be determined pending rehab evaluations  Code Status: Level 1 - Full Code    Subjective:   Pt in significant pain  About to go down for xrays of neck  She does report minimal improvement from the adjusted pain regimen  She does not believe she has voided since admission  Bedside bladder scan showed 507--does not feel she can urinate currently  Ordered protocol  Objective:     Vitals:   Temp (24hrs), Av 2 °F (37 3 °C), Min:98 7 °F (37 1 °C), Max:99 6 °F (37 6 °C)    Temp:  [98 7 °F (37 1 °C)-99 6 °F (37 6 °C)] 99 6 °F (37 6 °C)  HR:  [] 116  Resp:  [13-19] 16  BP: (101-144)/(56-85) 133/75  SpO2:  [82 %-98 %] 94 %  There is no height or weight on file to calculate BMI  Input and Output Summary (last 24 hours): Intake/Output Summary (Last 24 hours) at 2023 1246  Last data filed at 2023 4570  Gross per 24 hour   Intake 1120 ml   Output --   Net 1120 ml       Physical Exam:   Physical Exam  Vitals and nursing note reviewed  Constitutional:       General: She is in acute distress (secondary to pain)  Appearance: She is obese  Neck:      Comments: C collar in place   Cardiovascular:      Rate and Rhythm: Regular rhythm  Tachycardia present  Pulmonary:      Effort: No respiratory distress  Abdominal:      General: There is no distension  Tenderness: There is no abdominal tenderness  Musculoskeletal:      Right lower leg: Edema present  Left lower leg: Edema present  Comments: Bilateral extremities wrapped--see clinical media tab    Neurological:      Mental Status: She is oriented to person, place, and time     Psychiatric:         Mood and Affect: Mood normal          Additional Data:     Labs:  Results from last 7 days   Lab Units 05/24/23  2247   EOS PCT % 3   HEMATOCRIT % 29 9*   HEMOGLOBIN g/dL 9 3*   LYMPHS PCT % 16   MONOS PCT % 9   NEUTROS PCT % 71   PLATELETS Thousands/uL 256   WBC Thousand/uL 8 44     Results from last 7 days   Lab Units 05/24/23  2247   ANION GAP mmol/L 4   ALBUMIN g/dL 2 9*   ALK PHOS U/L 134*   ALT U/L 41   AST U/L 82*   BUN mg/dL 39*   CALCIUM mg/dL 9 3   CHLORIDE mmol/L 104   CO2 mmol/L 24   CREATININE mg/dL 1 62*   GLUCOSE RANDOM mg/dL 147*   POTASSIUM mmol/L 3 9   SODIUM mmol/L 132*   TOTAL BILIRUBIN mg/dL 0 40         Results from last 7 days   Lab Units 05/25/23  1117 05/25/23  0848   POC GLUCOSE mg/dl 138 124               Lines/Drains:  Invasive Devices     Peripheral Intravenous Line  Duration           Peripheral IV 05/24/23 Right Antecubital <1 day          Drain  Duration           External Urinary Catheter <1 day                      Imaging: Reviewed radiology reports from this admission including: all avail     Recent Cultures (last 7 days):         Last 24 Hours Medication List:   Current Facility-Administered Medications   Medication Dose Route Frequency Provider Last Rate   • acetaminophen  975 mg Oral [facility] ROSE MARY Vela     • allopurinol  200 mg Oral Daily ROSE MARY Higuera     • atorvastatin  40 mg Oral Daily ROSE MARY Higuera     • docusate sodium  100 mg Oral BID ROSE MARY Dixon     • gabapentin  100 mg Oral TID Jayden Dalal PA-C     • HYDROmorphone  0 5 mg Intravenous Q2H PRN Jayden Dalal PA-C     • HYDROmorphone  2 mg Oral Q4H PRN ROSE MARY Dixon     • HYDROmorphone  4 mg Oral Q4H PRN ROSE MARY Dixon     • insulin lispro  2-12 Units Subcutaneous TID AC ROSE MARY Dixon     • insulin lispro  2-12 Units Subcutaneous HS ROSE MARY Dixon     • isosorbide mononitrate  30 mg Oral Daily ROSE MARY Dixon     • lidocaine  1 patch Topical Daily Jayden Dalal PA-C     • methocarbamol  500 mg Oral Q6H [address] Jayden Dalal PA-C     • metoprolol tartrate  25 mg Oral Q12H [address] ROSE MARY Dixon     • multi-electrolyte  75 mL/hr Intravenous Continuous ROSE MARY Dixon 75 mL/hr (05/25/23 0046)   • oxybutynin  5 mg Oral BID ROSE MARY Dixon     • pantoprazole  40 mg Oral Daily ROSE MARY Dixon     • polyethylene glycol  17 g Oral Daily ROSE MARY Dixon          Today, Patient Was Seen By: Ashly Herzog PA-C    **Please Note: This note may have been constructed using a voice recognition system  **

## 2023-05-25 NOTE — ED ATTENDING ATTESTATION
5/24/2023  IKun MD, saw and evaluated the patient  I have discussed the patient with the resident/non-physician practitioner and agree with the resident's/non-physician practitioner's findings, Plan of Care, and MDM as documented in the resident's/non-physician practitioner's note, except where noted  All available labs and Radiology studies were reviewed  I was present for key portions of any procedure(s) performed by the resident/non-physician practitioner and I was immediately available to provide assistance  At this point I agree with the current assessment done in the Emergency Department    I have conducted an independent evaluation of this patient a history and physical is as follows:  C/o leg swelling  Had cervical fusion done 2 weeks ago    No cp no sob  No fever    No prior chf or dvt history   Exam neck no JVD lungs clear heart mildly tachycardic no murmurs abdomen soft nontender extremities bilateral edema some blistering noted over the right foot area there is no cellulitis there is no crepitation noted  Impression: Bilateral lower extremity swelling  considerations would include ingestive heart failure renal insufficiency, electrolyte abnormality, less likely DVT or cellulitis  ED Course         Critical Care Time  Procedures    The patient presented with a condition in which there was a high probability of imminent or life-threatening deterioration, and critical care services (excluding separately billable procedures and total teaching time ) total 30 minutes

## 2023-05-25 NOTE — PHYSICAL THERAPY NOTE
Physical Therapy Evaluation    Patient's Name: [facility]    Admitting Diagnosis  Lower extremity edema [R60 0]  Cervical pain [M54 2]  PHYLLIS (acute kidney injury) ([facility] [address] ) [N17 9]  Swelling of first metatarsophalangeal (MTP) joint of both feet [M25 474, M25 475]  Type 2 diabetes mellitus with renal complication ([facility] [address] ) [D54 41]    Problem List  Patient Active Problem List   Diagnosis   • Cervical pain   • Chronic kidney disease stage III with PHYLLIS   • Type 2 diabetes mellitus with renal complication ([facility] [address] )   • Coronary artery disease without angina pectoris   • Acid reflux   • Lower extremity edema   • Constipation   • Tachycardia   • Elevated d-dimer   • Anemia   • Hyponatremia       Past Medical History  Past Medical History:   Diagnosis Date   • Diabetes type 2    • MI (myocardial infarction) ([facility] [address] )    • Osteoarthritis        Past Surgical History  Past Surgical History:   Procedure Laterality Date   • BACK SURGERY     • CORONARY ANGIOPLASTY WITH STENT PLACEMENT N/A    • LAMINECTOMY     • MINIMALLY INVASIVE LUMBAR DECOMPRESSION          05/25/23 1211   PT Last Visit   PT Visit Date 05/25/23   Note Type   Note type Evaluation   Pain Assessment   Pain Assessment Tool 0-10   Pain Score 8   Pain Location/Orientation Orientation: Bilateral;Location: Neck  (+ upper back)   Hospital Pain Intervention(s) Ambulation/increased activity;Repositioned   Restrictions/Precautions   Weight Bearing Precautions Per Order No   Braces or Orthoses   ([place] J collar, used B post-op shoes due to blisters on tops of B feet)   Other Precautions Spinal precautions; Fall Risk;Multiple lines;Pain;O2  (O2 2L/min)   Home Living   Type of [address] [place] [address] Two level;Stairs to enter with rails;Bed/bath upstairs  (FFOS to enter, no bathroom on first floor (pt was staying downstairs on kitchen chair w/ BSC next to her))   [address]   Prior Function   Level of [place] Independent with ADLs; Independent with functional mobility; Independent with IADLS   Lives With Significant other   IADLs Independent with driving; Independent with meal prep; Independent with medication management   Vocational Retired  (retired )   General   Additional Pertinent History Pt underwent C3-T1 fusion 5/18/23  Pt reports being d/c'ed home on 5/20; s/o assisted her up the stairs to the first level, where she stayed primarily in the kitchen chair  Pt reports only being able to get to the [facility] a few times w/ a lot of effort  Family/Caregiver Present No   Cognition   Arousal/Participation Alert   Orientation Level Oriented X4   Comments unpleasant   RLE Assessment   RLE Assessment   (grossly 2+/5 (functionally assessed))   LLE Assessment   LLE Assessment   (grossly 2+/5 (functionally assessed))   Coordination   Movements are Fluid and Coordinated 0   Coordination and Movement Description antalgic   Bed Mobility   Rolling R 3  Moderate assistance   Additional items Assist x 1; Increased time required;Verbal cues   Supine to Sit 3  Moderate assistance   Additional items Assist x 2; Increased time required;Verbal cues;LE management;HOB elevated; Bedrails   Sit to Supine 3  Moderate assistance   Additional items Assist x 2; Increased time required;LE management;Verbal cues   Additional Comments Pt greeted in supine  Transfers   Sit to Stand 3  Moderate assistance   Additional items Assist x 2; Increased time required;Verbal cues  (elevated bed)   Stand to Sit 3  Moderate assistance   Additional items Assist x 2; Increased time required;Verbal cues   Additional Comments RW   Ambulation/Elevation   Gait pattern Excessively slow; Ataxia; Decreased foot clearance; Improper Weight shift   Gait Assistance 3  Moderate assist   Additional items Assist x 2;Verbal cues; Tactile cues   Assistive Device Rolling walker   Distance 3 side-steps to the left   Stair Management Assistance Not tested   Ambulation/Elevation Additional Comments Extended time required for all levels of mobility  Balance   Static Sitting Fair   Dynamic Sitting Fair -   Static Standing Poor +   Dynamic Standing Poor   Ambulatory Poor  (RW)   Endurance Deficit   Endurance Deficit Yes   Endurance Deficit Description pain   Activity Tolerance   Activity Tolerance Patient limited by pain   Medical Staff Made Aware OT [address]   Nurse Made Aware yes - cleared for therapy, present during mobility portion of assessment   Assessment   Prognosis Fair   Problem List Decreased strength;Decreased range of motion;Decreased endurance; Impaired balance;Decreased mobility;Obesity;Orthopedic restrictions;Pain;Decreased skin integrity   Assessment Pt is 78 y o  female seen for a high complexity PT evaluation s/p admit to [facility] on 5/24/2023  Pt presenting w/ cervical pain and BLE weakness s/p PCDF C3-T1 on 5/18/23 at [facility] (discharged 5/20/23)  Please see above for other active problem list / PMH  PT now consulted to assess functional mobility and needs for safe d/c planning  At baseline pt reports being I + active w/o AD, lives w/ s/o in a house w/ stairs  Pt reports that she was able to get up her stairs w/ assistance from her s/o; pt reports that she primarily stayed in her kitchen chair, was able to get up a few times to [facility] w/ increased effort  Currently pt requires ModAx2 for bed skills; ModAx2 for functional transfers; ModAx2 for limited ambulation w/ RW  Pt presents functioning below baseline and w/ overall mobility deficits 2* to: decreased LE strength; generalized weakness/deconditioning; decreased endurance; impaired balance; gait deviations; pain; fatigue; multiple lines  These impairments place pt at risk for falls  Pt will continue to benefit from skilled PT interventions to address stated impairments; to maximize functional potential; for ongoing pt/ family training; and DME needs  PT is currently recommending rehab     Barriers to Discharge Inaccessible home environment;Decreased caregiver support   Goals   Patient Goals less pain   STG Expiration Date 06/08/23   Short Term Goal #1 In 14 days pt will complete: 1) Bed mobility skills with Lexi to facilitate safe return to previous living environment  2) Functional transfers with Lexi to facilitate safe return to previous living environment  3) Ambulation with least restrictive ' w/ Lexi without LOB for safe ambulation in home/community environment  4) Improve balance scores by 1 grade to decrease fall risk  5) Improve LE strength grades by 1 to increase independence w/ all functional mobility, transfers and gait  6) PT for ongoing pt and family education; DME needs and D/C planning to promote highest level of function in least restrictive environment  7) Stair training up/ down 12 steps with most appropriate technique and Lexi for safe access to previous living environment and to increase community access  PT Treatment Day 0   Plan   Treatment/Interventions Functional transfer training;LE strengthening/ROM; Elevations; Therapeutic exercise; Endurance training;Patient/family training;Equipment eval/education; Bed mobility;Gait training; Compensatory technique education;Spoke to nursing;OT   PT Frequency 3-5x/wk   Recommendation   PT Discharge Recommendation Post acute rehabilitation services   AM-PAC Basic Mobility Inpatient   Turning in Flat Bed Without Bedrails 2   Lying on Back to Sitting on Edge of Flat Bed Without Bedrails 2   Moving Bed to Chair 2   Standing Up From Chair Using Arms 2   Walk in Room 1   Climb 3-5 Stairs With Railing 1   Basic Mobility Inpatient Raw Score 10   Highest Level Of Mobility   JH-HLM Goal 4: Move to chair/commode   JH-HLM Achieved 4: Move to chair/commode   End of Consult   Patient Position at End of Consult Supine; All needs within reach  (all lines in tact)     Sylvester Haines, PT, DPT

## 2023-05-25 NOTE — ASSESSMENT & PLAN NOTE
CTA PE study negative and main pulmonary artery, distal order branches were not evaluated due to respiratory motion artifact  · Lower extremity duplex pending

## 2023-05-25 NOTE — PROGRESS NOTES
Patient reevaluated this afternoon  States that her pain is much better controlled with adjustment of pain regimen  For now, will defer initiation of ketamine infusion  We will keep this as an option should her pain worsen during this admission      Emmanuelle Torres PA-C

## 2023-05-25 NOTE — PLAN OF CARE
"  Problem: OCCUPATIONAL THERAPY ADULT  Goal: Performs self-care activities at highest level of function for planned discharge setting  See evaluation for individualized goals  Description: Treatment Interventions: ADL retraining, Functional transfer training, UE strengthening/ROM, Endurance training, Patient/family training, Compensatory technique education, Continued evaluation, Energy conservation, Activityengagement          See flowsheet documentation for full assessment, interventions and recommendations  Note: Limitation: Decreased ADL status, Decreased UE ROM, Decreased UE strength, Decreased Safe judgement during ADL, Decreased endurance, Decreased self-care trans, Decreased high-level ADLs  Prognosis: Fair  Assessment: Pt is a 78 y o  female seen for OT evaluation s/p admit to [facility] on 5/24/2023 w/ Cervical pain  Of note pt with recent cervical sx at [facility] ago now with c/o increased pain  Per neuro sx \" Overall acceptable alignment and hardware placement  No obvious fractures of hardware  No neurosurgical intervention anticipated  Discussed with patient importance of mobilization with physical and Occupational Therapy\"  Pt is in a Maimi J cervical collar with active C -spine precautions  Comorbidities affecting pt's functional performance at time of assessment include: Diabetes type 2, MI (myocardial infarction) ([facility] [address] ), and Osteoarthritis  Personal factors affecting pt at time of IE include:steps to enter environment, limited home support, difficulty performing ADLS, difficulty performing IADLS , limited insight into deficits, decreased initiation and engagement  and health management   Prior to admission, pt was I with ADLS and IADLS  Upon evaluation: Pt presents supine and is agreeable to OTIE, on 1 5L L O2 via NC with all vitals WNL   Pt requires overall Mod A x2, 2* the following deficits impacting occupational performance: weakness, decreased strength, decreased balance, decreased " tolerance, decreased safety awareness, increased pain, orthopedic restrictions and decreased coping skills  Pt resting in supine at end of session with all needs in reach, alarm on, all lines in place and SCD's on  Pt to benefit from continued skilled OT tx while in the hospital to address deficits as defined above and maximize level of functional independence w ADL's and functional mobility  Occupational Performance areas to address include: grooming, bathing/shower, toilet hygiene, dressing, health maintenance, functional mobility, community mobility and clothing management  The patient's raw score on the AM-PAC Daily Activity inpatient short form is 11  , standardized score is 29 04  , less than 39 4  Patients at this level are likely to benefit from discharge to post-acute rehabilitation services  Please refer to the recommendation of the Occupational Therapist for safe discharge planning       OT Discharge Recommendation: Post acute rehabilitation services

## 2023-05-25 NOTE — ASSESSMENT & PLAN NOTE
Patient reports MI history, in 2010 with 1 stent placement  · Continue statin, BB, imdur  · Currently holding aspirin 81 mg daily, status post surgery--resume when cleared by her surgeon

## 2023-05-25 NOTE — ASSESSMENT & PLAN NOTE
Presents with cervical pain and b/l LE weakness s/p PCDF C3-T1 on 5/18/23 at [facility] with Dr Lindsay Madera (discharged 5/20/23)  No dedicated cervical imaging obtained upon admission  · Neurosurgery consulted, input appreciated  · Ordered upright cervical xrays, pending  · C-collar in place; per surgeon should be worn at all times during first 2 weeks, after 2 weeks may remove at sleep only for the next 4 weeks  · After third day postop, patient should shower daily and cover incision daily for the first 14 days with a clean dressing  · APS consulted, input appreciated  · Continue Tylenol 975 mg p o  every 8 hours  · Added gabapentin 100 mg p o  3 times daily, added lidocaine patch, added Robaxin 500 mg p o  every 6 hours scheduled  · On admission her p o  oxy was changed to p o  Dilaudid, continue for now  · Increased IV Dilaudid to every 2 hours as needed      · Consideration for ketamine infusion  · Bowel regimen  · CK elevated of note  · PT/OT consults pending

## 2023-05-25 NOTE — WOUND OSTOMY CARE
Consult Note - [facility] 78 y o  female MRN: [id number]  Unit/Bed#: [facility] 604-01 Encounter: [id number]        History and Present Illness:  Patient is 77 yo female admitted to [facility] with cervical pain  Patient has history of   Patient is incontinent of bowel and bladder  Patient is max assist with turning from side to side for assessment  Patient is independent with meals  Assessment Findings:     1  Bullae to B/L legs- legs are edematous with scattered intact and ruptured bullae with some partial thickness skin loss with moderate serosanguineous drainage  Sacral/buttocks and B/L heels intact and blanching     No induration, fluctuance, odor, warmth/temperature differences, redness, or purulence noted to the above noted wounds and skin areas assessed  New dressings applied per orders listed below  Patient tolerated well- no s/s of non-verbal pain or discomfort observed during the encounter  Bedside nurse aware of plan of care  See flow sheets for more detailed assessment findings  Wound care will continue to follow  Skin care plans:  1-Hydraguard to bilateral sacrum, buttock and heels BID and PRN  2-Elevate heels to offload pressure  3-Ehob cushion in chair when out of bed  4-Moisturize skin daily with skin nourishing cream   5-Turn/reposition q2h or when medically stable for pressure re-distribution on skin  6-Cleanse B/L legs with normal saline, apply adaptic and maxorb to open blisters and 3M no sting to intact blisters, cover with ABD, secure with laith and tape  Change daily and PRN soilage/displacement  Wounds:  Wound Foot Anterior; Left (Active)   Wound Image   05/25/23 0614   Wound Description Clean;Dry; Intact 05/25/23 1106   Cecile-wound Assessment Fragile 05/25/23 1106   Treatments Elevated 05/25/23 1106       Wound 05/25/23 Ankle Anterior; Left (Active)   Wound Image   05/25/23 0615   Wound Description Intact 05/25/23 1106   Cecile-wound Assessment Fragile 05/25/23 1106   Treatments Elevated 05/25/23 1106       Wound 05/25/23 Foot Anterior; Left (Active)   Wound Image    05/25/23 1104   Wound Description Beefy red 05/25/23 1106   Cecile-wound Assessment Fragile 05/25/23 1106   Wound Length (cm) 2 5 cm 05/25/23 1104   Wound Width (cm) 11 cm 05/25/23 1104   Wound Depth (cm) 0 1 cm 05/25/23 1104   Wound Surface Area (cm^2) 27 5 cm^2 05/25/23 1104   Wound Volume (cm^3) 2 75 cm^3 05/25/23 1104   Calculated Wound Volume (cm^3) 2 75 cm^3 05/25/23 1104   Treatments Elevated 05/25/23 1106   Dressing Gauze 05/25/23 1106       Wound 05/25/23 Pretibial Right (Active)   Wound Image    05/25/23 1103   Wound Description Intact 05/25/23 1106   Cecile-wound Assessment Fragile 05/25/23 1106   Wound Length (cm) 3 cm 05/25/23 1106   Wound Width (cm) 7 cm 05/25/23 1106   Wound Depth (cm) 0 1 cm 05/25/23 1106   Wound Surface Area (cm^2) 21 cm^2 05/25/23 1106   Wound Volume (cm^3) 2 1 cm^3 05/25/23 1106   Calculated Wound Volume (cm^3) 2 1 cm^3 05/25/23 1106   Treatments Elevated 05/25/23 1106       Wound 05/25/23 Foot Anterior;Right (Active)   Wound Image   05/25/23 0619   Wound Description Beefy red 05/25/23 1106   Cecile-wound Assessment Fragile;Erythema 05/25/23 1106   Treatments Elevated 05/25/23 1106   Patient Tolerance Tolerated well 05/25/23 1106       Wound 05/25/23 Ankle Anterior;Right (Active)   Wound Image   05/25/23 0618   Wound Description Beefy red 05/25/23 1106   Cecile-wound Assessment Erythema;Fragile 05/25/23 1106   Treatments Elevated 05/25/23 1106   Dressing Gauze 05/25/23 1106   Dressing Status Dry;Clean; Intact 05/25/23 1106       Wound 05/25/23 Incision Neck Posterior (Active)   Wound Description SAKINA 05/25/23 1106   Cecile-wound Assessment SAKINA 05/25/23 1106   Drainage Amount None 05/25/23 1106   Dressing Non adherent 05/25/23 1106   Dressing Changed Other (Comment) 05/25/23 1106       [id number] Rom[address] Foreman, RN, [facility]

## 2023-05-25 NOTE — ASSESSMENT & PLAN NOTE
Presents with increased LE edema with bullae bilateral lower extremities   Not on diuretics at baseline and no echo on file since 2012  · Check echocardiogram though BNP normal  · F/u LE venous duplex to r/o DVT  · Elevate lower extremities  · Wound care consulted

## 2023-05-25 NOTE — PLAN OF CARE
Problem: PHYSICAL THERAPY ADULT  Goal: Performs mobility at highest level of function for planned discharge setting  See evaluation for individualized goals  Description: Treatment/Interventions: Functional transfer training, LE strengthening/ROM, Elevations, Therapeutic exercise, Endurance training, Patient/family training, Equipment eval/education, Bed mobility, Gait training, Compensatory technique education, Spoke to nursing, OT          See flowsheet documentation for full assessment, interventions and recommendations  Note: Prognosis: Fair  Problem List: Decreased strength, Decreased range of motion, Decreased endurance, Impaired balance, Decreased mobility, Obesity, Orthopedic restrictions, Pain, Decreased skin integrity  Assessment: Pt is 78 y o  female seen for a high complexity PT evaluation s/p admit to [facility] on 5/24/2023  Pt presenting w/ cervical pain and BLE weakness s/p PCDF C3-T1 on 5/18/23 at [facility] (discharged 5/20/23)  Please see above for other active problem list / PMH  PT now consulted to assess functional mobility and needs for safe d/c planning  At baseline pt reports being I + active w/o AD, lives w/ s/o in a house w/ stairs  Pt reports that she was able to get up her stairs w/ assistance from her s/o; pt reports that she primarily stayed in her kitchen chair, was able to get up a few times to [facility] w/ increased effort  Currently pt requires ModAx2 for bed skills; ModAx2 for functional transfers; ModAx2 for limited ambulation w/ RW  Pt presents functioning below baseline and w/ overall mobility deficits 2* to: decreased LE strength; generalized weakness/deconditioning; decreased endurance; impaired balance; gait deviations; pain; fatigue; multiple lines  These impairments place pt at risk for falls  Pt will continue to benefit from skilled PT interventions to address stated impairments; to maximize functional potential; for ongoing pt/ family training; and DME needs   PT is currently recommending rehab  Barriers to Discharge: Inaccessible home environment, Decreased caregiver support     PT Discharge Recommendation: Post acute rehabilitation services    See flowsheet documentation for full assessment

## 2023-05-25 NOTE — ASSESSMENT & PLAN NOTE
Patient presents with cervical pain and b/l LE weakness s/p PCDF C3-T1 on 5/18/23 at [facility] with Dr Aida Anderson  (dc'd 5/20/23)   - [place] J Cervical collar in place, reports pain between shoulder blades  - Chronic LE neuropathy in setting of T2DM  - No incontinence sxs or saddle anesthesia  - Cervical Incision CDI  - Pulmonary embolism ruled out with CTA in setting of elevated D-dimer of 2 64    Imaging:  · CTA chest: No evidence of main pulmonary artery embolus  Distal order branches not diagnostically evaluated due to significant respiratory motion artifact    Plan:  · Continue to monitor neurological exam    · Ordered upright x-rays of cervical spine to evaluate  · Maintain cervical collar in place per primary surgeon  • Per operating surgeon: Nick Greenwood for 6 wks post sx, and at all times during the first 2 wks  ? After 2 wks may remove to sleep only   ? Pt is to fu with Dr Raudel Martinez in 2 wks post sx date, then 6 wks, 3 mo and 6 months, 2 yr and 2 yrs where xray will be taken at each visit  · After 3rd post op day pt should shower daily  Cover daily for first 14 days with a clean dresssing   · Wound care consulted for blisters on BLE - see photos in media - currently wrapped  · Pain management and labs per primary team   Acute pain management services consult appreciated  • Continue Tylenol 975 mg p o  every 8 hours scheduled  • Added gabapentin 100 mg p o  3 times daily scheduled  • Added lidocaine patch, on for 12 hours and off for 12 hours  • Added Robaxin 500 mg p o  every 6 hours scheduled  • Continue Dilaudid 2 mg p o  every 4 hours as needed moderate pain or 4 mg p o  every 4 hours as needed severe pain  • Changed Dilaudid to 0 5 mg IV every 2 hours as needed breakthrough pain  · Mobilize with PT/OT  · DVT PPX: consider pharm dvt ppx    No neurosurgical intervention anticipated at this time    Will follow from periphery and review imaging and pain management plan

## 2023-05-25 NOTE — ASSESSMENT & PLAN NOTE
Baseline appears around 1 0-1 2; upon discharge on 5/20 was 1 27  Presented with creatinine 1 62   She received contrast for PE study in ER  · Has been on gentle IVF overnight, agree for now though caution with b/l LE edema  · Holding ACE-inhibitor  · Monitor ability to void--will check PVR   · AM BMP  · If creat worsens, consult renal

## 2023-05-25 NOTE — DISCHARGE INSTR - OTHER ORDERS
Leg wound care:    Maxorb, ABD, 4 x 4 and Kerlix to bilateral lower extremities  Change every 2-3 days  After most of the bulla have deflated would recommend application of Ace wraps from the toes to below the tibial tuberosities  Follow up in wound care center at [facility]

## 2023-05-25 NOTE — CONSULTS
[facility]  Consult  Name: Lea Rebolledo 78 y o  female I MRN: [id number]  Unit/Bed#: [facility] 415-20 I Date of Admission: 5/24/2023   Date of Service: 5/25/2023 I Hospital Day: 0    Inpatient consult to Neurosurgery  Consult performed by: Tori Peters  Consult ordered by: ROSE MARY Gonzales        Addendum: Cervical spine upright x-rays completed  Final report pending  Overall acceptable alignment and hardware placement  No obvious fractures of hardware  No neurosurgical intervention anticipated  Discussed with patient importance of mobilization with physical and Occupational Therapy  Discussed possible consideration for rehab at discharge  Patient goal is to discharge home and continue with home therapies which were set up by her primary surgeon  She will have ongoing outpatient follow-up with her neurosurgical team   Neurosurgery will sign off and available for any questions or concerns  Assessment/Plan   * Cervical pain  Assessment & Plan  Patient presents with cervical pain and b/l LE weakness s/p PCDF C3-T1 on 5/18/23 at [facility] with Dr Connie Weathers  (dc'd 5/20/23)   - [place] J Cervical collar in place, reports pain between shoulder blades  - Chronic LE neuropathy in setting of T2DM  - No incontinence sxs or saddle anesthesia  - Cervical Incision CDI  - Pulmonary embolism ruled out with CTA in setting of elevated D-dimer of 2 64    Imaging:  · CTA chest: No evidence of main pulmonary artery embolus  Distal order branches not diagnostically evaluated due to significant respiratory motion artifact    Plan:  · Continue to monitor neurological exam    · Ordered upright x-rays of cervical spine to evaluate  · Maintain cervical collar in place per primary surgeon  • Per operating surgeon: Cresencio Rodrigues for 6 wks post sx, and at all times during the first 2 wks  ? After 2 wks may remove to sleep only   ?  Pt is to fu with Dr Talisha Lyman in 2 wks post sx date, then 6 wks, 3 mo "and 6 months, 2 yr and 2 yrs where xray will be taken at each visit  · After 3rd post op day pt should shower daily  Cover daily for first 14 days with a clean dresssing   · Wound care consulted for blisters on BLE - see photos in media - currently wrapped  · Pain management and labs per primary team   Acute pain management services consult appreciated  • Continue Tylenol 975 mg p o  every 8 hours scheduled  • Added gabapentin 100 mg p o  3 times daily scheduled  • Added lidocaine patch, on for 12 hours and off for 12 hours  • Added Robaxin 500 mg p o  every 6 hours scheduled  • Continue Dilaudid 2 mg p o  every 4 hours as needed moderate pain or 4 mg p o  every 4 hours as needed severe pain  • Changed Dilaudid to 0 5 mg IV every 2 hours as needed breakthrough pain  · Mobilize with PT/OT  · DVT PPX: consider pharm dvt ppx    No neurosurgical intervention anticipated at this time  Will follow from periphery and review imaging and pain management plan          History of Present Illness     HPI: Autumn Sands is a 78 y o  female with PMH including T2DM, CKD stage III, MI in 2010 s/p stenting, and lumbar laminectomy/decompression in 2017 who presents with complaint of cervical pain s/p PCDF C3-T1 on 5/18/23 at [facility] with Dr Myra Katz  She was able to ambulate to the bathroom and felt her pain was adequately controlled on day of discharge 5/20/23  She states that after she returned home she had significant pain around her neck/upper back in between her shoulder blades that was not controlled with her pain regimen of oxycodone 5 to 10 mg q4h PRN, 500mg of Tylenol q6-8h PRN, and tizanidine q8h  She has a history of b/l lower extremity neuropathy from her diabetes and says her legs have always been weak stating \"If I hit a home run, I'd still be thrown out at first because I can't run\"    She says she is having bilateral lower extremity weakness, which she attributes mostly to pain with most " movement due to her operation  She says she has been able to void, but has been constipated since her discharge  She denies any new numbness around her groin or genital area  She has experience lower extremity swelling since her discharge and has developed multiple fluid filled bullae on both extremities  She denies any fever, chills, dizziness, blurry vision, nausea, vomiting, new onset numbness or tingling, or incontinence symptoms at this time  Review of Systems   Constitutional: Positive for fatigue  Negative for chills and fever  HENT: Negative for ear pain and sore throat  Eyes: Negative for pain and visual disturbance  Respiratory: Positive for shortness of breath  Negative for cough  SOB with exertion   Cardiovascular: Negative for chest pain and palpitations  Gastrointestinal: Positive for constipation  Negative for abdominal pain, nausea and vomiting  Endocrine: Negative  Genitourinary: Negative for dysuria and hematuria  Musculoskeletal: Positive for back pain and neck pain  Skin: Positive for wound  Negative for color change and rash  Fluid filled bullae b/l LE   Neurological: Positive for weakness and numbness  Negative for dizziness, seizures, syncope, facial asymmetry and headaches  Chronic neuropathy from DM, lower extremity weakness (pain related)   Hematological: Negative  Psychiatric/Behavioral: Negative  All other systems reviewed and are negative        Historical Information   Past Medical History:   Diagnosis Date   • Diabetes type 2    • MI (myocardial infarction) ([facility] [address] )    • Osteoarthritis      Past Surgical History:   Procedure Laterality Date   • BACK SURGERY     • CORONARY ANGIOPLASTY WITH STENT PLACEMENT N/A    • LAMINECTOMY     • MINIMALLY INVASIVE LUMBAR DECOMPRESSION       Social History     Substance and Sexual Activity   Alcohol Use None     Social History     Substance and Sexual Activity   Drug Use Not on file     Social History     Tobacco Use   Smoking Status Never   Smokeless Tobacco Never     Family History   Problem Relation Age of Onset   • Diabetes Mother    • Heart attack Father    • Hodgkin's lymphoma Father         non hodgkins lymphoma   • Other Father         mesothelioma       Meds/Allergies   current meds:   Current Facility-Administered Medications   Medication Dose Route Frequency   • acetaminophen (TYLENOL) tablet 975 mg  975 mg Oral Q8H [address]   • allopurinol (ZYLOPRIM) tablet 200 mg  200 mg Oral Daily   • atorvastatin (LIPITOR) tablet 40 mg  40 mg Oral Daily   • docusate sodium (COLACE) capsule 100 mg  100 mg Oral BID   • gabapentin (NEURONTIN) capsule 100 mg  100 mg Oral TID   • HYDROmorphone (DILAUDID) injection 0 5 mg  0 5 mg Intravenous Q2H PRN   • HYDROmorphone (DILAUDID) tablet 2 mg  2 mg Oral Q4H PRN   • HYDROmorphone (DILAUDID) tablet 4 mg  4 mg Oral Q4H PRN   • insulin lispro (HumaLOG) 100 units/mL subcutaneous injection 2-12 Units  2-12 Units Subcutaneous TID AC   • insulin lispro (HumaLOG) 100 units/mL subcutaneous injection 2-12 Units  2-12 Units Subcutaneous HS   • isosorbide mononitrate (IMDUR) 24 hr tablet 30 mg  30 mg Oral Daily   • lidocaine (LIDODERM) 5 % patch 1 patch  1 patch Topical Daily   • methocarbamol (ROBAXIN) tablet 500 mg  500 mg Oral Q6H KATHERYN   • metoprolol tartrate (LOPRESSOR) tablet 25 mg  25 mg Oral Q12H KATHERYN   • multi-electrolyte (PLASMALYTE-A/ISOLYTE-S PH 7 4) IV solution  75 mL/hr Intravenous Continuous   • oxybutynin (DITROPAN) tablet 5 mg  5 mg Oral BID   • pantoprazole (PROTONIX) EC tablet 40 mg  40 mg Oral Daily   • polyethylene glycol (MIRALAX) packet 17 g  17 g Oral Daily    and PTA meds:   Prior to Admission Medications   Prescriptions Last Dose Informant Patient Reported?  Taking?   allopurinol (ZYLOPRIM) 100 mg tablet   Yes Yes   Sig: Take 200 mg by mouth daily   aspirin 81 mg chewable tablet   Yes Yes   Sig: Chew 81 mg daily   atorvastatin (LIPITOR) 40 mg tablet   Yes Yes Sig: Take 40 mg by mouth daily   baclofen 10 mg tablet   Yes Yes   Sig: Take 10 mg by mouth 2 (two) times a day as needed for muscle spasms   glimepiride (AMARYL) 4 mg tablet   Yes Yes   Sig: Take 8 mg by mouth every morning before breakfast   isosorbide mononitrate (IMDUR) 30 mg 24 hr tablet   Yes Yes   Sig: Take 30 mg by mouth daily   lisinopril (ZESTRIL) 2 5 mg tablet   Yes Yes   Sig: Take 2 5 mg by mouth daily   metFORMIN (GLUCOPHAGE) 1000 MG tablet   Yes Yes   Sig: Take 1,000 mg by mouth daily with dinner   metFORMIN (GLUCOPHAGE) 500 mg tablet   Yes Yes   Sig: Take 500 mg by mouth daily with breakfast   metoprolol tartrate (LOPRESSOR) 25 mg tablet   Yes Yes   Sig: Take 25 mg by mouth every 12 (twelve) hours   oxybutynin (DITROPAN) 5 mg tablet   Yes Yes   Sig: Take 5 mg by mouth 2 (two) times a day   pantoprazole (PROTONIX) 40 mg tablet   Yes Yes   Sig: Take 40 mg by mouth daily   pioglitazone (ACTOS) 30 mg tablet   Yes Yes   Sig: Take 30 mg by mouth daily      Facility-Administered Medications: None     Allergies   Allergen Reactions   • Latex Rash       Objective   I/O       05/23 0701  05/24 0700 05/24 0701  05/25 0700 05/25 0701  05/26 0700    P  O    120    I V  (mL/kg)  1000 (8)     Total Intake(mL/kg)  1000 (8) 120 (1)    Net  +1000 +120                 Physical Exam  Constitutional:       Appearance: Normal appearance  She is obese  She is not toxic-appearing  HENT:      Head: Normocephalic and atraumatic  Right Ear: External ear normal       Left Ear: External ear normal       Nose: Nose normal       Mouth/Throat:      Mouth: Mucous membranes are moist       Pharynx: Oropharynx is clear  Eyes:      General:         Right eye: No discharge  Left eye: No discharge  Extraocular Movements: Extraocular movements intact and EOM normal       Conjunctiva/sclera: Conjunctivae normal       Pupils: Pupils are equal, round, and reactive to light  Neck:      Comments:  In cervical collar  Cardiovascular:      Rate and Rhythm: Tachycardia present  Pulses: Normal pulses  Pulmonary:      Effort: Pulmonary effort is normal  No respiratory distress  Musculoskeletal:      Right lower leg: Edema present  Left lower leg: Edema present  Skin:     General: Skin is warm and dry  Comments: Multiple fluid filled bullae on b/l lower extremities   Neurological:      Mental Status: She is alert and oriented to person, place, and time  Psychiatric:         Mood and Affect: Mood normal          Speech: Speech normal          Behavior: Behavior normal        Neurologic Exam     Mental Status   Oriented to person, place, and time  Oriented to person  Oriented to place  Oriented to city  Oriented to year and month  Attention: normal  Concentration: normal    Speech: speech is normal   Level of consciousness: alert  Knowledge: good  Cranial Nerves     CN III, IV, VI   Pupils are equal, round, and reactive to light  Extraocular motions are normal    Right pupil: Reactivity: brisk  Left pupil: Reactivity: brisk  CN III: no CN III palsy  CN VI: no CN VI palsy  Nystagmus: none   Ophthalmoparesis: none  Upgaze: normal  Downgaze: normal  Conjugate gaze: present    CN V   Facial sensation intact  CN XI   CN XI normal    Right trapezius strength: normal  Left trapezius strength: normal    CN XII   CN XII normal      Motor Exam   Right arm pronator drift: absent  Left arm pronator drift: absentMotor exam limited by pain (4/5)   5/5 b/l      Sensory Exam   Light touch normal    Right arm light touch: normal  Left arm light touch: normal  Right leg light touch: normal  Left leg light touch: normal    Gait, Coordination, and Reflexes     Reflexes   Right Stokes: absent  Left Stokes: absentNo issues with toe tapping, unable to perform heel to shin due to pain       Vitals:Blood pressure 134/74, pulse 102, temperature 99 6 °F (37 6 °C), resp   rate 16, weight 126 kg (276 lb 10 8 oz), SpO2 92 %  ,There is no height or weight on file to calculate BMI  Lab Results:   Results from last 7 days   Lab Units 05/24/23  2247   EOS PCT % 3   HEMATOCRIT % 29 9*   HEMOGLOBIN g/dL 9 3*   MONOS PCT % 9   NEUTROS PCT % 71   PLATELETS Thousands/uL 256   WBC Thousand/uL 8 44     Results from last 7 days   Lab Units 05/25/23  1336 05/24/23  2247   ALK PHOS U/L  --  134*   ALT U/L  --  41   AST U/L  --  82*   BUN mg/dL 34* 39*   CALCIUM mg/dL 9 0 9 3   CHLORIDE mmol/L 107 104   CO2 mmol/L 22 24   CREATININE mg/dL 1 33* 1 62*   POTASSIUM mmol/L 4 4 3 9                   Imaging Studies: I have personally reviewed pertinent reports  and I have personally reviewed pertinent films in PACS    CTA ED chest PE Study    Result Date: 5/25/2023  Impression: 1  No evidence of main pulmonary artery embolus  Distal order branches not diagnostically evaluated due to significant respiratory motion artifact  2  No acute cardiopulmonary process  Workstation performed: [id number]       EKG, Pathology, and Other Studies: I have personally reviewed pertinent reports  and I have personally reviewed pertinent films in PACS    VTE Prophylaxis: Sequential compression device (Venodyne)     Code Status: Level 1 - Full Code  Advance Directive and Living Will:      Power of :    POLST:      Counseling / Coordination of Care  I spent 30 minutes with the patient

## 2023-05-25 NOTE — OCCUPATIONAL THERAPY NOTE
Occupational Therapy Evaluation     Patient Name: Salma PATEL Date: 5/25/2023  Problem List  Principal Problem:    Cervical pain  Active Problems:    Chronic kidney disease stage III with PHYLLIS    Type 2 diabetes mellitus with renal complication (HCC)    Coronary artery disease without angina pectoris    Acid reflux    Lower extremity edema    Constipation    Tachycardia    Elevated d-dimer    Anemia    Hyponatremia    Past Medical History  Past Medical History:   Diagnosis Date    Diabetes type 2     MI (myocardial infarction) ([facility] [address] )     Osteoarthritis      Past Surgical History  Past Surgical History:   Procedure Laterality Date    BACK SURGERY      CORONARY ANGIOPLASTY WITH STENT PLACEMENT N/A     LAMINECTOMY      MINIMALLY INVASIVE LUMBAR DECOMPRESSION             05/25/23 1210   OT Last Visit   OT Visit Date 05/25/23   Note Type   Note type Evaluation   Pain Assessment   Pain Score 8   Pain Location/Orientation Location: Neck   Restrictions/Precautions   Weight Bearing Precautions Per Order No   Braces or Orthoses C/S Collar  (Kian PENDLETON - given post op from her previous sx)   Other Precautions Multiple lines;O2;Fall Risk;Pain;Spinal precautions   Home Living   Type of [address] [facility] Two level;Stairs to enter with rails;Bed/bath upstairs   [address]   Prior Function   Level of [place] Independent with ADLs; Independent with functional mobility; Independent with IADLS   Lives With Significant other   Receives Help From Family   IADLs Independent with driving; Independent with meal prep; Independent with medication management   Vocational Retired   Lifestyle   Autonomy Pt reports total I with all ADLS and IADLS prior to her recent surgery however the last few weeks she states she has needed assistance with all ADLS and IADLS, typically is able to drive, has not the last two weeks due to same   Reciprocal Relationships "supportive s/o who she lives with   Service to Others retired    [address] will continue to assess   Subjective   Subjective \"I can't it hurts too much\"   ADL   Where Assessed Edge of bed   Eating Assistance 3  Moderate Assistance   Grooming Assistance 3  Moderate Assistance   UB Bathing Assistance 2  Maximal Assistance   LB Bathing Assistance 1  Total Assistance   UB Dressing Assistance 2  Maximal Assistance   LB Dressing Assistance 1  Total Assistance   Toileting Assistance  1  Total Assistance   Bed Mobility   Supine to Sit 3  Moderate assistance   Additional items Assist x 2   Sit to Supine 3  Moderate assistance   Additional items Assist x 2   Additional Comments sits WOB with overall close SBA   Transfers   Sit to Stand 3  Moderate assistance   Additional items Assist x 2   Stand to Sit 3  Moderate assistance   Additional items Assist x 2   Additional Comments HHA   Functional Mobility   Functional Mobility 3  Moderate assistance   Additional Comments Ax2 few steps toward HOB  BL HHA   Balance   Static Sitting Fair   Dynamic Sitting Fair -   Static Standing Poor +   Dynamic Standing Poor   Ambulatory Poor -   Activity Tolerance   Activity Tolerance Patient limited by pain   Medical Staff Made Aware DPT, NSG Aware   Nurse Made Aware yes   RUE Assessment   RUE Assessment X  (limited by c/o pain at this time unable to complete formal ROM/MMT due to same)   LUE Assessment   LUE Assessment X  (limited by c/o pain at this time unable to complete formal ROM/MMT due to same)   Psychosocial   Psychosocial (WDL) X   Patient Behaviors/Mood Anxious   Cognition   Overall Cognitive Status [facility]   Arousal/Participation Alert; Responsive; Cooperative   Attention Attends with cues to redirect   Orientation Level Oriented X4   Memory Decreased recall of precautions   Following Commands Follows one step commands with increased time or repetition   Comments Pt is irritable,  decreased motivation to " "participate  Believed to be pain related   Assessment   Limitation Decreased ADL status; Decreased UE ROM; Decreased UE strength;Decreased Safe judgement during ADL;Decreased endurance;Decreased self-care trans;Decreased high-level ADLs   Prognosis Fair   Assessment Pt is a 78 y o  female seen for OT evaluation s/p admit to B on 5/24/2023 w/ Cervical pain  Of note pt with recent cervical sx at [facility] ago now with c/o increased pain  Per neuro sx \" Overall acceptable alignment and hardware placement  No obvious fractures of hardware  No neurosurgical intervention anticipated  Discussed with patient importance of mobilization with physical and Occupational Therapy\"  Pt is in a Maimi J cervical collar with active C -spine precautions  Comorbidities affecting pt's functional performance at time of assessment include: Diabetes type 2, MI (myocardial infarction) ([facility] [place] [address] ), and Osteoarthritis  Personal factors affecting pt at time of IE include:steps to enter environment, limited home support, difficulty performing ADLS, difficulty performing IADLS , limited insight into deficits, decreased initiation and engagement  and health management   Prior to admission, pt was I with ADLS and IADLS  Upon evaluation: Pt presents supine and is agreeable to OTIE, on 1 5L L O2 via NC with all vitals WNL  Pt requires overall Mod A x2, 2* the following deficits impacting occupational performance: weakness, decreased strength, decreased balance, decreased tolerance, decreased safety awareness, increased pain, orthopedic restrictions and decreased coping skills  Pt resting in supine at end of session with all needs in reach, alarm on, all lines in place and SCD's on  Pt to benefit from continued skilled OT tx while in the hospital to address deficits as defined above and maximize level of functional independence w ADL's and functional mobility   Occupational Performance areas to address include: grooming, bathing/shower, toilet " hygiene, dressing, health maintenance, functional mobility, community mobility and clothing management  The patient's raw score on the AM-PAC Daily Activity inpatient short form is 11  , standardized score is 29 04  , less than 39 4  Patients at this level are likely to benefit from discharge to post-acute rehabilitation services  Please refer to the recommendation of the Occupational Therapist for safe discharge planning  Goals   Patient Goals To have less pain   LTG Time Frame 10-14   Long Term Goal #1 See below   Plan   Treatment Interventions ADL retraining;Functional transfer training;UE strengthening/ROM; Endurance training;Patient/family training; Compensatory technique education;Continued evaluation; Energy conservation; Activityengagement   Goal Expiration Date 06/08/23   OT Frequency 2-3x/wk   Recommendation   OT Discharge Recommendation Post acute rehabilitation services   [facility]   Lower Body Dressing 1   Bathing 1   Toileting 1   Upper Body Dressing 2   Grooming 3   Eating 3   Daily Activity Raw Score 11   Daily Activity Standardized Score (Calc for Raw Score >=11) 29 04   AM-[facility] Applied Cognition Inpatient   Following a Speech/Presentation 3   Understanding Ordinary Conversation 4   Taking Medications 4   Remembering Where Things Are Placed or Put Away 4   Remembering List of 4-5 Errands 4   Taking Care of Complicated Tasks 3   Applied Cognition Raw Score 22   Applied Cognition Standardized Score 47 83     OT goals to be addressed in the next 14 days:    Pt will increase activity tolerance to G for 30 min txment sessions    Pt will complete UB/LB dressing/self care w/ S using adaptive device and DME as needed    Pt will complete toileting w/ S w/ G hygiene/thoroughness using DME as needed    Pt will improve functional transfers to S on/off all surfaces using DME as needed w/ G balance/safety     Pt will improve functional mobility during ADL/IADL/leisure tasks to S using DME as needed w/ G balance/safety     Pt will demonstrate G carryover of pt/caregiver education and training as appropriate  Pt will independently identify and utilize 2-3 coping strategies to increase positive affect and promote overall well-being      Pt will demonstrate 100% carryover of spinal precautions and E C  techniques t/o fx'l I/ADL/ leisure tasks w/o cues s/p skilled education

## 2023-05-25 NOTE — H&P
[facility]  H&P  Name: Bryan Arthur 78 y o  female I MRN: [id number]  Unit/Bed#: [facility] 668-45 I Date of Admission: 5/24/2023   Date of Service: 5/25/2023 I Hospital Day: 0      Assessment/Plan   * Cervical pain  Assessment & Plan  S/p recent Posterior cervical decompression and fusion with instrumentation, allograft or autograft C3 - T1 with intraoperative neuromonitoring by Dr Debbie De Souza on 5/18/23 at [facility]  · Cta imaging r/o PE : Negative in main pulmonary artery  However, unable to fully appreciate in distal order branches dt signficant respiratory motion artifact  · Troponins x 2 negative   · D-dimer is 2 64 - would chk lower extremity duplex bl  · Per surgeon pt should wear Collar for 6 wks post sx, and at all times during the first 2 wks  · After 2 wks may remove to sleep only   · Pt is to fu with Dr Malka Lang in 2 wks post sx date, then 6 wks, 3 mo and 6 months, 2 yr and 2 yrs where xray will be taken at each visit  · After 3rd post op day pt should shower daily   Cover daily for first 14 days with a clean dresssing   · Patient usually takes baclofen 10 mg twice daily as needed but since hospital admission was discharged on tizanidine, she reports accidentally taking it every 4 hours was then told to take it every 6 hours as needed -we will transition to Robaxin for now  · Continue around-the-clock Tylenol  · We will consult pain management -patient reports oxycodone every 4 hours to x5 mg tabs not working        Chronic kidney disease stage III with PHYLLIS  Assessment & Plan  In setting of CKD 3 , Suspect acute kidney injury due to dehydration/medications   · Creatinine 1 62 last noted creatinine 5 days ago at [facility] was 1 27   · Pt is sp ct scan with iv contrast   · Would administer iv fluids overnight   · Strict I's and O's patient reports voiding daily over the last few days  · Bladder scan chk pvr continue oxybutynin 5 mg twice a day  · Avoid nephrotoxic "medications   · Avoid hypotension   · Corrected calcium 10 2 suspect some component of dehydration   · Hold metformin status post CT with contrast glimepiride and pioglitazone  · Continue allopurinol 200 mg daily    Constipation  Assessment & Plan   In the setting of post cervical surgery, and fear of using bowel regimen secondary to inability to wipe herself due to pain  · Bowel regimen  · Colace twice daily, MiraLAX daily    History of MI (myocardial infarction)  Assessment & Plan  Reported history of MI, resulting in stent placement  · Hold home lisinopril 2 5 mg daily, until renal recovery then resume  · Continue metoprolol 25 mg twice daily  · CMP/BMP daily    Lower extremity edema  Assessment & Plan  In setting of CKD 3 with PHYLLIS and postsurgical procedure  · Also noting multiple bullae bilateral lower extremities  · Elevate lower extremities  · Consult wound care for input  · Will administer low-dose dose IV fluids for renal recovery,-discontinue  · Noted to have elevated D-dimer   · We will obtain lower extremity bilateral duplex rule out DVT  · As needed Dilaudid IV for pain control during DVT duplex rule out    Acid reflux  Assessment & Plan  · Continue pantoprazole 40 mg daily    Coronary artery disease without angina pectoris  Assessment & Plan  Patient reports MI history, in 2010 with 1 stent placement  · Continue atorvastatin 40 mg daily  · Continue isosorbide mononitrate ER, 30 mg daily  · Currently holding aspirin 81 mg daily, status post surgery    Type 2 diabetes mellitus with renal complication ([facility] [address] )  Assessment & Plan  No results found for: \"HGBA1C\"    No results for input(s): \"POCGLU\" in the last 72 hours      Blood Sugar Average: Last 72 hrs:    · Complicated by bilateral lower extremity neuropathy  · Pt reports A1c of 6 0 on April 5th 2023   · Ada Diet   · Home meds  (metformin 500 mg in the morning, 1000 mg in the evening, glimepiride 8 mg once a day,Pioglitazone hcl 30 mg daily,)  · Hold oral " medications  · Initiate sliding scale insulin            VTE Pharmacologic Prophylaxis: VTE Score: 7 High Risk (Score >/= 5) - Pharmacological DVT Prophylaxis Contraindicated  Sequential Compression Devices Ordered  In setting of postop cervical patient  Code Status: Full code  Discussion with family: Updated  (Boyfriend) at bedside  Patient reports she has a best friend who she prefers detailed information to go to and her name is Mela Griffin her phone number is [phone]  Anticipated Length of Stay: Patient will be admitted on an inpatient basis with an anticipated length of stay of greater than 2 midnights secondary to Due to PHYLLIS on CKD in setting of postop patient  Total Time Spent on Date of Encounter in care of patient: 65 minutes This time was spent on one or more of the following: performing physical exam; counseling and coordination of care; obtaining or reviewing history; documenting in the medical record; reviewing/ordering tests, medications or procedures; communicating with other healthcare professionals and discussing with patient's family/caregivers  Chief Complaint: Cervical pain    History of Present Illness:  Ludin Jurado is a 78 y o  female with a PMH reported by patient of diabetes type 2, chronic kidney disease stage IIl, MI in 2010 with 1 stent placed, prior lumbar decompression and laminectomy 6 years ago who presents with cervical pain  Patient reports recent posterior cervical decompression and fusion with instrumentation allograft autograft C3-T1 on 5/18/2023  Patient reports that she was able to get up postprocedure and walk to the bathroom with her rolling walker and return on day of discharge 5/20/2023    Patient states that she returned home and within 1 hour had significant pain for which the pain regimen she was discharged on did not control her pain(oxycodone 5 to 10 mg every 4 hours as needed, 500 mg of Tylenol every 6-8 hours as needed and tizanidine every 8 hours as needed)  Bilateral lower extremity chronic neuropathy and now with new postoperative limited mobility  Denies any new paresthesias or cauda equina type symptoms  Patient reports that she was able to sit with legs dangling and only noticed large blisters on her bilateral lateral lower extremities approximately 2 days ago  She reports only voiding 1 time a day with the exception of the yesterday when she voided twice  She denies any bowel movement since discharge and reports last bowel movement was during her surgical hospital stay  She endorses eating well and drinking well  She denies any chest pain chest pressure or shortness of breath, currently on 2 L of oxygen  She reports increased pain in her bilateral lower extremities, also endorses severe pain in posterior neck  Patient admitted for evaluation by neurosurgery, acute pain service and renal recovery  Review of Systems:  Review of Systems   Constitutional: Positive for activity change (Limited ability to lift lower extremities) and fatigue  Negative for appetite change, chills, diaphoresis, fever and unexpected weight change  HENT: Negative for congestion, dental problem, drooling, ear discharge, ear pain, facial swelling, hearing loss, mouth sores, nosebleeds, postnasal drip, rhinorrhea and sore throat  Eyes: Negative for photophobia, pain, discharge, redness, itching and visual disturbance  Respiratory: Negative for apnea, cough, choking, chest tightness, shortness of breath, wheezing and stridor  Gastrointestinal: Positive for constipation (No BM since prior hospitalization)  Negative for abdominal pain, anal bleeding, blood in stool, diarrhea, nausea and vomiting  Endocrine: Negative for cold intolerance, heat intolerance, polydipsia and polyphagia  Genitourinary: Negative for decreased urine volume, dysuria and urgency  Musculoskeletal: Positive for back pain and neck pain   Negative for gait problem, joint swelling and myalgias  Skin: Positive for wound (Posterior neck)  Allergic/Immunologic: Negative for environmental allergies  Neurological: Positive for weakness (Bilateral lower extremities) and numbness (And tingling in bilateral lower extremities/neuropathy)  Negative for dizziness, seizures, syncope, facial asymmetry, speech difficulty, light-headedness and headaches  Psychiatric/Behavioral: Negative for agitation, behavioral problems, confusion, decreased concentration, dysphoric mood, hallucinations, self-injury, sleep disturbance and suicidal ideas  The patient is not nervous/anxious and is not hyperactive  Past Medical and Surgical History:   Past Medical History:   Diagnosis Date   • Diabetes type 2    • MI (myocardial infarction) ([facility] [address] )    • Osteoarthritis        Past Surgical History:   Procedure Laterality Date   • BACK SURGERY     • CORONARY ANGIOPLASTY WITH STENT PLACEMENT N/A    • LAMINECTOMY     • MINIMALLY INVASIVE LUMBAR DECOMPRESSION         Meds/Allergies:  Prior to Admission medications    Not on File     I have reviewed home medications with patient personally  Allergies:    Allergies   Allergen Reactions   • Latex Rash       Social History:  Marital Status: Single with boyfriend  Occupation: Retired  Patient Pre-hospital Living Situation: Home  Patient Pre-hospital Level of Mobility: walks with walker postprocedure  Patient Pre-hospital Diet Restrictions: Diabetic diet  Substance Use History:   Social History     Substance and Sexual Activity   Alcohol Use None     Social History     Tobacco Use   Smoking Status Never   Smokeless Tobacco Never     Social History     Substance and Sexual Activity   Drug Use Not on file       Family History:  Family History   Problem Relation Age of Onset   • Diabetes Mother    • Heart attack Father    • Hodgkin's lymphoma Father         non hodgkins lymphoma   • Other Father         mesothelioma       Physical Exam:     Vitals:   Blood Pressure: 118/58 (05/25/23 0430)  Pulse: (!) 110 (05/25/23 0430)  Temperature: 98 7 °F (37 1 °C) (05/24/23 2237)  Temp Source: Oral (05/24/23 2237)  Respirations: 16 (05/25/23 0230)  Weight - Scale: 126 kg (276 lb 10 8 oz) (05/24/23 2239)  SpO2: 95 % (05/25/23 0430)    Physical Exam  Constitutional:       General: She is not in acute distress  Appearance: She is obese  She is not ill-appearing, toxic-appearing or diaphoretic  HENT:      Head:      Comments: C-collar in place with surgical incision wound noted to have no erythema edema or drainage     Nose: No congestion  Mouth/Throat:      Pharynx: Oropharynx is clear  No posterior oropharyngeal erythema  Eyes:      General:         Right eye: No discharge  Left eye: No discharge  Cardiovascular:      Rate and Rhythm: Normal rate  Heart sounds: No murmur heard  No gallop  Pulmonary:      Effort: No respiratory distress  Breath sounds: No stridor  No wheezing, rhonchi or rales  Chest:      Chest wall: No tenderness  Abdominal:      General: There is no distension  Palpations: There is no mass  Tenderness: There is no abdominal tenderness  There is no guarding or rebound  Hernia: No hernia is present  Musculoskeletal:      Right lower leg: Edema present  Left lower leg: Edema present  Comments: Bilateral lower extremity bullae multiple   Skin:     Coloration: Skin is not jaundiced or pale  Findings: No bruising, erythema, lesion or rash  Neurological:      Mental Status: She is alert and oriented to person, place, and time     Psychiatric:         Behavior: Behavior normal           Additional Data:     Lab Results:  Results from last 7 days   Lab Units 05/24/23 2247   EOS PCT % 3   HEMATOCRIT % 29 9*   HEMOGLOBIN g/dL 9 3*   LYMPHS PCT % 16   MONOS PCT % 9   NEUTROS PCT % 71   PLATELETS Thousands/uL 256   WBC Thousand/uL 8 44     Results from last 7 days   Lab Units 05/24/23 2247   ANION GAP mmol/L 4 ALBUMIN g/dL 2 9*   ALK PHOS U/L 134*   ALT U/L 41   AST U/L 82*   BUN mg/dL 39*   CALCIUM mg/dL 9 3   CHLORIDE mmol/L 104   CO2 mmol/L 24   CREATININE mg/dL 1 62*   GLUCOSE RANDOM mg/dL 147*   POTASSIUM mmol/L 3 9   SODIUM mmol/L 132*   TOTAL BILIRUBIN mg/dL 0 40                       Lines/Drains:  Invasive Devices     Peripheral Intravenous Line  Duration           Peripheral IV 05/24/23 Right Antecubital <1 day                    Imaging: Reviewed radiology reports from this admission including: chest CT scan  CTA ED chest PE Study   Final Result by Esa Ashton MD (05/25 1342)         1  No evidence of main pulmonary artery embolus  Distal order branches not diagnostically evaluated due to significant respiratory motion artifact  2  No acute cardiopulmonary process  Workstation performed: [id number]         VAS lower limb venous duplex study, complete bilateral    (Results Pending)       EKG and Other Studies Reviewed on Admission:   · EKG: Sinus Tachycardia       ** Please Note: This note has been constructed using a voice recognition system   **

## 2023-05-25 NOTE — ASSESSMENT & PLAN NOTE
"No results found for: \"HGBA1C\"    No results for input(s): \"POCGLU\" in the last 72 hours      Blood Sugar Average: Last 72 hrs:    · Complicated by bilateral lower extremity neuropathy  · Pt reports A1c of 6 0 on April 5th 2023   · Ada Diet   · Home meds  (metformin 500 mg in the morning, 1000 mg in the evening, glimepiride 8 mg once a day,Pioglitazone hcl 30 mg daily,)  · Hold oral medications  · Initiate sliding scale insulin       "

## 2023-05-25 NOTE — ASSESSMENT & PLAN NOTE
Sinus on admission EKG, suspect secondary to pain  · CTA negative for PE however distal branches not evaluated due to respiratory motion artifact   · Venous duplex pending  · Consideration for telemetry and further w/u if persists  · Control pain as above

## 2023-05-25 NOTE — ASSESSMENT & PLAN NOTE
In setting of CKD 3 with PHYLLIS and postsurgical procedure  · Also noting multiple bullae bilateral lower extremities  · Elevate lower extremities  · Consult wound care for input  · Will administer low-dose dose IV fluids for renal recovery,-discontinue  · Noted to have elevated D-dimer   · We will obtain lower extremity bilateral duplex rule out DVT  · As needed Dilaudid IV for pain control during DVT duplex rule out

## 2023-05-25 NOTE — ASSESSMENT & PLAN NOTE
· Continue Tylenol 975 mg p o  every 8 hours scheduled  · Add gabapentin 100 mg p o  3 times daily scheduled  · Add lidocaine patch, on for 12 hours and off for 12 hours  · Add Robaxin 500 mg p o  every 6 hours scheduled  · Continue Dilaudid 2 mg p o  every 4 hours as needed moderate pain or 4 mg p o  every 4 hours as needed severe pain  · Change Dilaudid to 0 5 mg IV every 2 hours as needed breakthrough pain  · We will give a one-time dose of IV Dilaudid 0 5 mg x 1 now  · Ice or moist heat to painful area for up to 20 minutes every hour as needed  · Do not use heat over site of lidocaine patch  · Continue bowel regimen to avoid opioid-induced constipation while on opioid pain medication

## 2023-05-25 NOTE — ASSESSMENT & PLAN NOTE
"No results found for: \"HGBA1C\"    Recent Labs     05/25/23  0848 05/25/23  1117   POCGLU 124 138       Blood Sugar Average: Last 72 hrs:  · Pt reports A1c of 6 0 on April 5th 2023   Despite well controlled DM, she reports chronic neuropathy   · Home meds held  (metformin 500 mg in the morning, 1000 mg in the evening, glimepiride 8 mg once a day,Pioglitazone hcl 30 mg daily)  · SSI/accuchecks  · Gabapentin initiated by APS   (P) 131    "

## 2023-05-25 NOTE — ASSESSMENT & PLAN NOTE
• CrCl cannot be calculated (Unknown ideal weight  )  • Will attempt to minimize renally excreted pain medications

## 2023-05-25 NOTE — ASSESSMENT & PLAN NOTE
S/p recent Posterior cervical decompression and fusion with instrumentation, allograft or autograft C3 - T1 with intraoperative neuromonitoring by Dr Addie Alejandra on 5/18/23 at [facility]  · Cta imaging r/o PE : Negative in main pulmonary artery  However, unable to fully appreciate in distal order branches dt signficant respiratory motion artifact  · Troponins x 2 negative   · D-dimer is 2 64 - would chk lower extremity duplex bl  · Per surgeon pt should wear Collar for 6 wks post sx, and at all times during the first 2 wks  · After 2 wks may remove to sleep only   · Pt is to fu with Dr Jakob Ulrich in 2 wks post sx date, then 6 wks, 3 mo and 6 months, 2 yr and 2 yrs where xray will be taken at each visit  · After 3rd post op day pt should shower daily   Cover daily for first 14 days with a clean dresssing   · Patient usually takes baclofen 10 mg twice daily as needed but since hospital admission was discharged on tizanidine, she reports accidentally taking it every 4 hours was then told to take it every 6 hours as needed -we will transition to Robaxin for now  · Continue around-the-clock Tylenol  · We will consult pain management -patient reports oxycodone every 4 hours to x5 mg tabs not working

## 2023-05-25 NOTE — ASSESSMENT & PLAN NOTE
Reported history of MI, resulting in stent placement  · Hold home lisinopril 2 5 mg daily, until renal recovery then resume  · Continue metoprolol 25 mg twice daily  · CMP/BMP daily

## 2023-05-26 ENCOUNTER — APPOINTMENT (INPATIENT)
Dept: NON INVASIVE DIAGNOSTICS | Facility: HOSPITAL | Age: 68
DRG: 552 | End: 2023-05-26
Payer: MEDICARE

## 2023-05-26 PROBLEM — E87.1 HYPONATREMIA: Status: RESOLVED | Noted: 2023-05-25 | Resolved: 2023-05-26

## 2023-05-26 PROBLEM — R00.0 TACHYCARDIA: Status: RESOLVED | Noted: 2023-05-25 | Resolved: 2023-05-26

## 2023-05-26 LAB
ALBUMIN SERPL BCP-MCNC: 2.4 G/DL (ref 3.5–5)
ALP SERPL-CCNC: 111 U/L (ref 46–116)
ALT SERPL W P-5'-P-CCNC: 32 U/L (ref 12–78)
ANION GAP SERPL CALCULATED.3IONS-SCNC: 1 MMOL/L (ref 4–13)
AORTIC ROOT: 3.1 CM
AORTIC VALVE MEAN VELOCITY: 14.1 M/S
APICAL FOUR CHAMBER EJECTION FRACTION: 76 %
ASCENDING AORTA: 3.4 CM
AST SERPL W P-5'-P-CCNC: 56 U/L (ref 5–45)
AV AREA BY CONTINUOUS VTI: 1.7 CM2
AV AREA PEAK VELOCITY: 1.8 CM2
AV LVOT MEAN GRADIENT: 2 MMHG
AV LVOT PEAK GRADIENT: 5 MMHG
AV MEAN GRADIENT: 9 MMHG
AV PEAK GRADIENT: 17 MMHG
AV VALVE AREA: 1.72 CM2
AV VELOCITY RATIO: 0.5
BASOPHILS # BLD AUTO: 0.02 THOUSANDS/ÂΜL (ref 0–0.1)
BASOPHILS NFR BLD AUTO: 0 % (ref 0–1)
BILIRUB SERPL-MCNC: 0.43 MG/DL (ref 0.2–1)
BUN SERPL-MCNC: 30 MG/DL (ref 5–25)
CALCIUM ALBUM COR SERPL-MCNC: 10.5 MG/DL (ref 8.3–10.1)
CALCIUM SERPL-MCNC: 9.2 MG/DL (ref 8.3–10.1)
CHLORIDE SERPL-SCNC: 109 MMOL/L (ref 96–108)
CO2 SERPL-SCNC: 26 MMOL/L (ref 21–32)
CREAT SERPL-MCNC: 1.22 MG/DL (ref 0.6–1.3)
DOP CALC AO PEAK VEL: 2.2 M/S
DOP CALC AO VTI: 44.94 CM
DOP CALC LVOT AREA: 3.46 CM2
DOP CALC LVOT DIAMETER: 2.1 CM
DOP CALC LVOT PEAK VEL VTI: 22.3 CM
DOP CALC LVOT PEAK VEL: 1.1 M/S
DOP CALC LVOT STROKE VOLUME: 77.2
E WAVE DECELERATION TIME: 267 MS
EOSINOPHIL # BLD AUTO: 0.25 THOUSAND/ÂΜL (ref 0–0.61)
EOSINOPHIL NFR BLD AUTO: 4 % (ref 0–6)
ERYTHROCYTE [DISTWIDTH] IN BLOOD BY AUTOMATED COUNT: 15.2 % (ref 11.6–15.1)
FRACTIONAL SHORTENING: 37 (ref 28–44)
GFR SERPL CREATININE-BSD FRML MDRD: 46 ML/MIN/1.73SQ M
GLUCOSE SERPL-MCNC: 129 MG/DL (ref 65–140)
GLUCOSE SERPL-MCNC: 130 MG/DL (ref 65–140)
GLUCOSE SERPL-MCNC: 130 MG/DL (ref 65–140)
GLUCOSE SERPL-MCNC: 143 MG/DL (ref 65–140)
GLUCOSE SERPL-MCNC: 174 MG/DL (ref 65–140)
HCT VFR BLD AUTO: 25.3 % (ref 34.8–46.1)
HGB BLD-MCNC: 8.4 G/DL (ref 11.5–15.4)
IMM GRANULOCYTES # BLD AUTO: 0.1 THOUSAND/UL (ref 0–0.2)
IMM GRANULOCYTES NFR BLD AUTO: 2 % (ref 0–2)
INTERVENTRICULAR SEPTUM IN DIASTOLE (PARASTERNAL SHORT AXIS VIEW): 0.9 CM
INTERVENTRICULAR SEPTUM: 0.9 CM (ref 0.6–1.1)
LAAS-AP2: 17.5 CM2
LAAS-AP4: 16.9 CM2
LEFT ATRIUM SIZE: 3.5 CM
LEFT INTERNAL DIMENSION IN SYSTOLE: 3.3 CM (ref 2.1–4)
LEFT VENTRICULAR INTERNAL DIMENSION IN DIASTOLE: 5.2 CM (ref 3.5–6)
LEFT VENTRICULAR POSTERIOR WALL IN END DIASTOLE: 0.9 CM
LEFT VENTRICULAR STROKE VOLUME: 85 ML
LVSV (TEICH): 85 ML
LYMPHOCYTES # BLD AUTO: 1.24 THOUSANDS/ÂΜL (ref 0.6–4.47)
LYMPHOCYTES NFR BLD AUTO: 19 % (ref 14–44)
MCH RBC QN AUTO: 30.1 PG (ref 26.8–34.3)
MCHC RBC AUTO-ENTMCNC: 33.2 G/DL (ref 31.4–37.4)
MCV RBC AUTO: 91 FL (ref 82–98)
MONOCYTES # BLD AUTO: 0.56 THOUSAND/ÂΜL (ref 0.17–1.22)
MONOCYTES NFR BLD AUTO: 9 % (ref 4–12)
MV E'TISSUE VEL-SEP: 9 CM/S
MV PEAK A VEL: 0.75 M/S
MV PEAK E VEL: 77 CM/S
MV STENOSIS PRESSURE HALF TIME: 77 MS
MV VALVE AREA P 1/2 METHOD: 2.86
NEUTROPHILS # BLD AUTO: 4.32 THOUSANDS/ÂΜL (ref 1.85–7.62)
NEUTS SEG NFR BLD AUTO: 66 % (ref 43–75)
NRBC BLD AUTO-RTO: 0 /100 WBCS
PLATELET # BLD AUTO: 223 THOUSANDS/UL (ref 149–390)
PMV BLD AUTO: 10.7 FL (ref 8.9–12.7)
POTASSIUM SERPL-SCNC: 4 MMOL/L (ref 3.5–5.3)
PROT SERPL-MCNC: 5.9 G/DL (ref 6.4–8.4)
RA PRESSURE ESTIMATED: 5 MMHG
RBC # BLD AUTO: 2.79 MILLION/UL (ref 3.81–5.12)
RIGHT ATRIUM AREA SYSTOLE A4C: 19 CM2
RIGHT VENTRICLE ID DIMENSION: 3.9 CM
RV PSP: 38 MMHG
SL CV LEFT ATRIUM LENGTH A2C: 6.6 CM
SL CV LV EF: 60
SL CV PED ECHO LEFT VENTRICLE DIASTOLIC VOLUME (MOD BIPLANE) 2D: 131 ML
SL CV PED ECHO LEFT VENTRICLE SYSTOLIC VOLUME (MOD BIPLANE) 2D: 46 ML
SODIUM SERPL-SCNC: 136 MMOL/L (ref 135–147)
TR MAX PG: 33 MMHG
TR PEAK VELOCITY: 2.9 M/S
TRICUSPID ANNULAR PLANE SYSTOLIC EXCURSION: 2 CM
TRICUSPID VALVE PEAK REGURGITATION VELOCITY: 2.87 M/S
WBC # BLD AUTO: 6.49 THOUSAND/UL (ref 4.31–10.16)

## 2023-05-26 PROCEDURE — 80053 COMPREHEN METABOLIC PANEL: CPT | Performed by: INTERNAL MEDICINE

## 2023-05-26 PROCEDURE — 93306 TTE W/DOPPLER COMPLETE: CPT

## 2023-05-26 PROCEDURE — 99232 SBSQ HOSP IP/OBS MODERATE 35: CPT | Performed by: INTERNAL MEDICINE

## 2023-05-26 PROCEDURE — 93306 TTE W/DOPPLER COMPLETE: CPT | Performed by: INTERNAL MEDICINE

## 2023-05-26 PROCEDURE — 85025 COMPLETE CBC W/AUTO DIFF WBC: CPT | Performed by: INTERNAL MEDICINE

## 2023-05-26 PROCEDURE — 99232 SBSQ HOSP IP/OBS MODERATE 35: CPT | Performed by: PHYSICIAN ASSISTANT

## 2023-05-26 PROCEDURE — 82948 REAGENT STRIP/BLOOD GLUCOSE: CPT

## 2023-05-26 RX ORDER — HEPARIN SODIUM 5000 [USP'U]/ML
7500 INJECTION, SOLUTION INTRAVENOUS; SUBCUTANEOUS EVERY 8 HOURS SCHEDULED
Status: DISCONTINUED | OUTPATIENT
Start: 2023-05-26 | End: 2023-05-31 | Stop reason: HOSPADM

## 2023-05-26 RX ORDER — HEPARIN SODIUM 5000 [USP'U]/ML
5000 INJECTION, SOLUTION INTRAVENOUS; SUBCUTANEOUS EVERY 8 HOURS SCHEDULED
Status: DISCONTINUED | OUTPATIENT
Start: 2023-05-26 | End: 2023-05-26

## 2023-05-26 RX ADMIN — ATORVASTATIN CALCIUM 40 MG: 40 TABLET, FILM COATED ORAL at 17:57

## 2023-05-26 RX ADMIN — POLYETHYLENE GLYCOL 3350 17 G: 17 POWDER, FOR SOLUTION ORAL at 08:33

## 2023-05-26 RX ADMIN — METHOCARBAMOL TABLETS 500 MG: 500 TABLET, COATED ORAL at 13:46

## 2023-05-26 RX ADMIN — ISOSORBIDE MONONITRATE 30 MG: 30 TABLET, EXTENDED RELEASE ORAL at 08:32

## 2023-05-26 RX ADMIN — METHOCARBAMOL TABLETS 500 MG: 500 TABLET, COATED ORAL at 05:48

## 2023-05-26 RX ADMIN — METOPROLOL TARTRATE 25 MG: 25 TABLET, FILM COATED ORAL at 08:32

## 2023-05-26 RX ADMIN — METOPROLOL TARTRATE 25 MG: 25 TABLET, FILM COATED ORAL at 21:23

## 2023-05-26 RX ADMIN — GABAPENTIN 100 MG: 100 CAPSULE ORAL at 08:32

## 2023-05-26 RX ADMIN — DOCUSATE SODIUM 100 MG: 100 CAPSULE, LIQUID FILLED ORAL at 17:57

## 2023-05-26 RX ADMIN — LIDOCAINE 5% 1 PATCH: 700 PATCH TOPICAL at 08:35

## 2023-05-26 RX ADMIN — OXYBUTYNIN CHLORIDE 5 MG: 5 TABLET ORAL at 08:33

## 2023-05-26 RX ADMIN — METHOCARBAMOL TABLETS 500 MG: 500 TABLET, COATED ORAL at 17:57

## 2023-05-26 RX ADMIN — GABAPENTIN 100 MG: 100 CAPSULE ORAL at 21:23

## 2023-05-26 RX ADMIN — OXYBUTYNIN CHLORIDE 5 MG: 5 TABLET ORAL at 17:57

## 2023-05-26 RX ADMIN — ACETAMINOPHEN 975 MG: 325 TABLET ORAL at 21:23

## 2023-05-26 RX ADMIN — HEPARIN SODIUM 7500 UNITS: 5000 INJECTION INTRAVENOUS; SUBCUTANEOUS at 13:47

## 2023-05-26 RX ADMIN — DOCUSATE SODIUM 100 MG: 100 CAPSULE, LIQUID FILLED ORAL at 08:33

## 2023-05-26 RX ADMIN — ACETAMINOPHEN 975 MG: 325 TABLET ORAL at 13:46

## 2023-05-26 RX ADMIN — PANTOPRAZOLE SODIUM 40 MG: 40 TABLET, DELAYED RELEASE ORAL at 08:33

## 2023-05-26 RX ADMIN — HEPARIN SODIUM 7500 UNITS: 5000 INJECTION INTRAVENOUS; SUBCUTANEOUS at 21:26

## 2023-05-26 RX ADMIN — INSULIN LISPRO 2 UNITS: 100 INJECTION, SOLUTION INTRAVENOUS; SUBCUTANEOUS at 21:26

## 2023-05-26 RX ADMIN — ACETAMINOPHEN 975 MG: 325 TABLET ORAL at 05:48

## 2023-05-26 RX ADMIN — ALLOPURINOL 200 MG: 100 TABLET ORAL at 17:57

## 2023-05-26 NOTE — PROGRESS NOTES
[facility]  Progress Note  Name: [facility]  MRN: [id number]  Unit/Bed#: [facility] 497-66 I Date of Admission: 5/24/2023   Date of Service: 5/26/2023  Hospital Day: 1    Assessment/Plan   * Cervical pain  Assessment & Plan  Presents with cervical pain and b/l LE weakness s/p PCDF C3-T1 on 5/18/23 at [facility] with Dr Samantha Madera (discharged 5/20/23)  No dedicated cervical imaging obtained upon admission  · Neurosurgery consulted, input appreciated  · Ordered upright cervical xrays, official results pending however per Nsx read, acceptable alignment and hardware placement  · C-collar in place; per surgeon should be worn at all times during first 2 weeks, after 2 weeks may remove at sleep only for the next 4 weeks  · After third day postop, patient should shower daily and cover incision daily for the first 14 days with a clean dressing  · APS consulted, input appreciated  · Continue Tylenol 975 mg p o  every 8 hours  · Added gabapentin 100 mg p o  3 times daily, added lidocaine patch, added Robaxin 500 mg p o  every 6 hours scheduled  · On admission her p o  oxy was changed to p o  Dilaudid, continue for now  · On IV dilaudid currently, hopeful to wean  · Bowel regimen in place  · CK elevated of note  · Will place on SQ heparin based on Nsx notes   · PT/OT recommend rehab however patient wishes to discharge home if possible as long as her pain is controlled over next several days         Lower extremity edema  Assessment & Plan  Presents with increased LE edema with bullae bilateral lower extremities  Not on diuretics at baseline and no echo on file since 2012   Likely 2/2 recent surgical procedure and sitting with legs in dependent position  · Echo pending though BNP normal  · LE venous duplex negative  · Elevate lower extremities  · Wound care consulted     Anemia  Assessment & Plan  Hgb 9 3 on admission, potentially related to recent surgery  · monitor CBC, slight "decrease to 8 4 on 5/26 could be related to fluids     Elevated d-dimer  Assessment & Plan  CTA PE study negative and main pulmonary artery, distal order branches were not evaluated due to respiratory motion artifact  · Lower extremity duplex negiatve    Constipation  Assessment & Plan  2/2 surgical procedure  · Bowel regimen ordered    Acid reflux  Assessment & Plan  · Continue pantoprazole 40 mg daily    Coronary artery disease without angina pectoris  Assessment & Plan  Patient reports MI history, in 2010 with 1 stent placement  · Continue statin, BB, imdur  · Currently holding aspirin 81 mg daily, status post surgery--resume when cleared by her surgeon    Type 2 diabetes mellitus with renal complication [facility])  Assessment & Plan  No results found for: \"HGBA1C\"    Recent Labs     05/25/23  1117 05/25/23  1601 05/25/23  2055 05/26/23  0714   POCGLU 138 149* 145* 130       Blood Sugar Average: Last 72 hrs:  · Pt reports A1c of 6 0 on April 5th 2023  Despite well controlled DM, she reports chronic neuropathy   · Home meds held  (metformin 500 mg in the morning, 1000 mg in the evening, glimepiride 8 mg once a day,Pioglitazone hcl 30 mg daily)  · SSI/accuchecks  · Gabapentin initiated by APS   (P) 137 2      Chronic kidney disease stage III with PHYLLIS  Assessment & Plan  Baseline appears around 1 0-1 2; upon discharge on 5/20 was 1 27  Presented with creatinine 1 62   She received contrast for PE study in ER  · Has been on gentle IVF (75 mL/hr) since admission--will now discontinue  · Holding ACE-inhibitor  · Urinary retention protocol in place--required straight cath x1 on 5/25 PM, monitor for ability to void  · Suspect could be partially related to her constipation   · Creat improving     Hyponatremia-resolved as of 5/26/2023  Assessment & Plan  Mild on admission, ?dehdyration given elevated BUN/Cr as well  · Now resolved     Tachycardia-resolved as of 5/26/2023  Assessment & Plan  Sinus on admission EKG, suspect " secondary to pain  · CTA negative for PE however distal branches not evaluated due to respiratory motion artifact   · Venous duplex negative  · Consideration for telemetry and further w/u if persists  · Control pain as above              VTE Pharmacologic Prophylaxis: VTE Score: 7 Moderate Risk (Score 3-4) - Pharmacological DVT Prophylaxis Ordered: heparin  Patient Centered Rounds: I performed bedside rounds with nursing staff today  Discussions with Specialists or Other Care Team Provider: appreciate APS  Education and Discussions with Family / Patient: Patient declined call to   Total Time Spent on Date of Encounter in care of patient: 25 minutes This time was spent on one or more of the following: performing physical exam; counseling and coordination of care; obtaining or reviewing history; documenting in the medical record; reviewing/ordering tests, medications or procedures; communicating with other healthcare professionals and discussing with patient's family/caregivers  Current Length of Stay: 1 day(s)  Current Patient Status: Inpatient   Certification Statement: The patient will continue to require additional inpatient hospital stay due to pain control, bowel regimen  Discharge Plan: Anticipate discharge in 48-72 hrs to home with home services  (though rehab is recommended)    Code Status: Level 1 - Full Code    Subjective:   Doing okay  Feels her pain is better controlled at rest today though she has not tried to move yet  She would like to try going to the bathroom to have a BM  She feels she might also be able to urinate if she can sit on the toilet  She is not agreeable to rehab at this time, she would like to try to get pain better controlled and be discharged home eventually  She denies any SOB currently though was placed on oxygen overnight       Objective:     Vitals:   Temp (24hrs), Av 7 °F (36 5 °C), Min:97 7 °F (36 5 °C), Max:97 7 °F (36 5 °C)    Temp:  [97 7 °F (36 5 °C)] 97 7 °F (36 5 °C)  HR:  [] 75  Resp:  [16-18] 18  BP: (117-134)/(62-93) 117/62  SpO2:  [89 %-94 %] 94 %  There is no height or weight on file to calculate BMI  Input and Output Summary (last 24 hours): Intake/Output Summary (Last 24 hours) at 5/26/2023 0951  Last data filed at 5/26/2023 0841  Gross per 24 hour   Intake 1302 5 ml   Output 1350 ml   Net -47 5 ml       Physical Exam:   Physical Exam  Vitals and nursing note reviewed  Constitutional:       Appearance: She is obese  Comments: C collar in place, on 2L via NC    Cardiovascular:      Rate and Rhythm: Normal rate and regular rhythm  Pulmonary:      Effort: No respiratory distress  Abdominal:      General: There is no distension  Palpations: Abdomen is soft  Tenderness: There is no abdominal tenderness  Musculoskeletal:      Right lower leg: Edema present  Left lower leg: Edema present  Comments: Bilateral lower extremities wrapped   Neurological:      Mental Status: She is oriented to person, place, and time     Psychiatric:         Mood and Affect: Mood normal           Additional Data:     Labs:  Results from last 7 days   Lab Units 05/26/23  0646   EOS PCT % 4   HEMATOCRIT % 25 3*   HEMOGLOBIN g/dL 8 4*   LYMPHS PCT % 19   MONOS PCT % 9   NEUTROS PCT % 66   PLATELETS Thousands/uL 223   WBC Thousand/uL 6 49     Results from last 7 days   Lab Units 05/26/23  0646   ANION GAP mmol/L 1*   ALBUMIN g/dL 2 4*   ALK PHOS U/L 111   ALT U/L 32   AST U/L 56*   BUN mg/dL 30*   CALCIUM mg/dL 9 2   CHLORIDE mmol/L 109*   CO2 mmol/L 26   CREATININE mg/dL 1 22   GLUCOSE RANDOM mg/dL 129   POTASSIUM mmol/L 4 0   SODIUM mmol/L 136   TOTAL BILIRUBIN mg/dL 0 43         Results from last 7 days   Lab Units 05/26/23  0714 05/25/23  2055 05/25/23  1601 05/25/23  1117 05/25/23  0848   POC GLUCOSE mg/dl 130 145* 149* 138 124               Lines/Drains:  Invasive Devices     Peripheral Intravenous Line  Duration Peripheral IV 05/24/23 Right Antecubital 1 day          Drain  Duration           External Urinary Catheter <1 day                      Imaging: No pertinent imaging reviewed  Recent Cultures (last 7 days):         Last 24 Hours Medication List:   Current Facility-Administered Medications   Medication Dose Route Frequency Provider Last Rate   • acetaminophen  975 mg Oral [facility] ROSE MARY Mayfield     • allopurinol  200 mg Oral Daily ROSE MARY Mayfield     • atorvastatin  40 mg Oral Daily ROSE MARY Mayfield     • docusate sodium  100 mg Oral BID ROSE MARY Mayfield     • gabapentin  100 mg Oral TID Huang Manjarrez PA-C     • heparin (porcine)  5,000 Units Subcutaneous [facility] Jourdan Huizar PA-C     • HYDROmorphone  0 5 mg Intravenous Q2H PRN Huang Manjarrez PA-C     • HYDROmorphone  2 mg Oral Q4H PRN ROSE MARY Mayfield     • HYDROmorphone  4 mg Oral Q4H PRN ROSE MARY Mayfield     • insulin lispro  2-12 Units Subcutaneous TID AC ROSE MARY Mayfield     • insulin lispro  2-12 Units Subcutaneous HS ROSE MARY Mayfield     • isosorbide mononitrate  30 mg Oral Daily ROSE MARY Mayfield     • lidocaine  1 patch Topical Daily Huang Manjarrez PA-C     • methocarbamol  500 mg Oral Q6H [address] Huang Manjarrez PA-C     • metoprolol tartrate  25 mg Oral Q12H [address] ROSE MARY Mayfield     • oxybutynin  5 mg Oral BID ROSE MARY Mayfield     • pantoprazole  40 mg Oral Daily ROSE MARY Mayfield     • polyethylene glycol  17 g Oral Daily ROSE MARY Mayfield          Today, Patient Was Seen By: Jourdan Huizar PA-C    **Please Note: This note may have been constructed using a voice recognition system  **

## 2023-05-26 NOTE — ASSESSMENT & PLAN NOTE
Hgb 9 3 on admission, potentially related to recent surgery  · monitor CBC, slight decrease to 8 4 on 5/26 could be related to fluids

## 2023-05-26 NOTE — APP STUDENT NOTE
[facility] STUDENT  Inpatient Progress Note for TRAINING ONLY  Not Part of Legal Medical Record       Progress Note July Vaca 78 y o  female MRN: [id number]    Unit/Bed#: [facility] 604-01 Encounter: [id number]      Assessment and Plan:  1  Cervical pain      Presented with cervical pain and bilateral LE weakness s/p PCDF C3-T1 on 5/18 at [facility] with Dr Willie Quiros, discharged 5/20     Neuro, APS, PT/OT following     Upright cervical x-rays pending     C-collar in place, per surgeon should be worn at all times during first 2 weeks, then may remove at sleep only for next 4 weeks     Patient to be showering daily and covering incision with a clean dressing daily for first 14 days post op     Continue Tylenol 975 mg PO every 8 hours, lidocaine patch, Robaxin 500 mg PO every 6 hours     CK elevated on admission  2  Lower extremity edema      Presented with bilateral lower extremity edema with bullae      Likely due to recent surgical procedure      Wound care following      Lower extremity venous duplex negative      ECHO ordered, negative      Elevated lower extremities  3  Anemia      Hgb 9 3 on admission, potentially related to recent surgery      Hgb 8 4 today      Repeat CBC  4  Elevated d-dimer      D-dimer 2 64 on admission      CTA negative      Lower extremity venous duplex negative  5  Constipation       Reports no bowel movements since prior to surgery      Started Colace and Miralax  6  Acid reflux       Continue pantoprazole 40 mg PO daily  7  Coronary artery disease without angina pectoris       History of MI in 2010 with 1 stent placement       Continue atorvastatin 40 mg PO daily, Lopressor 25 mg PO every 12 hours, IMDUR 30 mg PO daily  8  Type 2 diabetes mellitus with renal complication       Patient reports A1c of 6 0 on 4/5/23       Reports chronic neuropathy       Start gabapentin 100 mg PO tid per APS       Hold Metformin, glimepiride, pioglitazone       Monitor glucose  9   Chronic kidney disease "stage III with PHYLLIS       Baseline Cr 1 0-1 2, on admission 1 62       Hold ACE-inhibitor       Straight cath x1 5/25       Cr 1 22 today, improving       Monitor urine output       Repeat BMP  10  Hyponatremia         Na 136 today  11  Tachycardia         Sinus on EKG at admission, likely secondary to pain         CTA negative         Lower extremity venous duplex normal         Regular rate today    Subjective:   Patient reports doing much better than yesterday  Denies any acute complaints  Reports pain is better controlled, and her left arm is most bothersome now  She is still constipated and just had a very small bowel movement, but is voiding  Reports that she does not want to rehab, but would do 1 week at most  Denies chest pain, shortness of breath, dizziness, lightheadedness, nausea, vomiting, diarrhea  Objective:     Vitals: Blood pressure 117/62, pulse 75, temperature 97 7 °F (36 5 °C), resp  rate 18, height 5' 4\" (1 626 m), weight 122 kg (270 lb), SpO2 94 %  ,Body mass index is 46 35 kg/m²        Intake/Output Summary (Last 24 hours) at 5/26/2023 1333  Last data filed at 5/26/2023 0841  Gross per 24 hour   Intake 1062 5 ml   Output 1350 ml   Net -287 5 ml       Physical Exam: /62   Pulse 75   Temp 97 7 °F (36 5 °C)   Resp 18   Ht 5' 4\" (1 626 m)   Wt 122 kg (270 lb)   SpO2 94%   BMI 46 35 kg/m²   General appearance: alert and oriented, in no acute distress  Head: Normocephalic, without obvious abnormality, atraumatic  Neck: C collar in place  Back: no tenderness to percussion or palpation  Lungs: clear to auscultation bilaterally  Heart: regular rate and rhythm, S1, S2 normal, no murmur, click, rub or gallop  Abdomen: soft, non-tender; bowel sounds normal; no masses,  no organomegaly  Extremities: edema bilateral lower extremities  Skin: bullae on bilateral lower extremities     Lab Results   Component Value Date    HCT 25 3 (L) 05/26/2023    HGB 8 4 (L) 05/26/2023    MCV 91 05/26/2023    PLT " 223 05/26/2023    WBC 6 49 05/26/2023     Lab Results   Component Value Date    AGAP 1 (L) 05/26/2023    ALKPHOS 111 05/26/2023    ALT 32 05/26/2023    AST 56 (H) 05/26/2023    BUN 30 (H) 05/26/2023    CALCIUM 9 2 05/26/2023     (H) 05/26/2023    CO2 26 05/26/2023    CREATININE 1 22 05/26/2023    EGFR 46 05/26/2023    GLUC 129 05/26/2023    K 4 0 05/26/2023    SODIUM 136 05/26/2023    TBILI 0 43 05/26/2023    TP 5 9 (L) 05/26/2023         Invasive Devices     Peripheral Intravenous Line  Duration           Peripheral IV 05/24/23 Right Antecubital 1 day          Drain  Duration           External Urinary Catheter 1 day                Lab, Imaging and other studies: I have personally reviewed pertinent reports      VTE Pharmacologic Prophylaxis: Heparin

## 2023-05-26 NOTE — ASSESSMENT & PLAN NOTE
Presents with increased LE edema with bullae bilateral lower extremities  Not on diuretics at baseline and no echo on file since 2012   Likely 2/2 recent surgical procedure and sitting with legs in dependent position  · Echo pending though BNP normal  · LE venous duplex negative  · Elevate lower extremities  · Wound care consulted

## 2023-05-26 NOTE — PROGRESS NOTES
[facility]  Progress Note  Name: Leticia Solis  MRN: [id number]  [facility] Date of Admission: 5/24/2023   Date of Service: 5/26/2023 I Hospital Day: 1    Assessment/Plan   Chronic kidney disease stage III with PHYLLIS  Assessment & Plan  • Estimated Creatinine Clearance: 58 4 mL/min (by C-G formula based on SCr of 1 22 mg/dL)  • Will attempt to minimize renally excreted pain medications  * Cervical pain  Assessment & Plan  · Continue Tylenol 975 mg p o  every 8 hours scheduled  · Continue gabapentin 100 mg p o  3 times daily scheduled  · Continue lidocaine patch, on for 12 hours and off for 12 hours  · Continue Robaxin 500 mg p o  every 6 hours scheduled  · Continue Dilaudid 2 mg p o  every 4 hours as needed moderate pain or 4 mg p o  every 4 hours as needed severe pain  · Discontinue IV Dilaudid  · Ice or moist heat to painful area for up to 20 minutes every hour as needed  · Do not use heat over site of lidocaine patch  · Continue bowel regimen to avoid opioid-induced constipation while on opioid pain medication  · At discharge, suggest the following:  · Tylenol 975 mg p o  every 8 hours as needed mild pain  · Gabapentin 100 mg p o  3 times daily scheduled  · Lidocaine patch, on for 12 hours and off for 12 hours  · Robaxin 500 mg p o  every 6 hours scheduled x5 days, then as needed for muscle spasm  · Dilaudid 2 mg p o  every 4 hours as needed moderate to severe pain x3 to 4 days, then discontinue  · Bowel regimen while on opioid pain medication  · Follow-up with primary care provider soon as possible after discharge  · Patient is not to take newly prescribed oral Dilaudid with previously prescribed hydrocodone/acetaminophen or oxycodone  Acute pain due to recent PCDF  APS will continue to follow  Please contact Acute Pain Service - [facility] via [employer] from [phone] with additional questions or concerns   See Hillary or Dimitri for additional contacts and after hours information  Pain History  Current pain location(s): Posterior neck  Pain Scale:   6-9 out of 10  Quality: Sharp, aching  24 hour history: Patient states that her pain has improved since yesterday with adjustment of pain regimen  Has used minimal as needed opioid pain medications in the past 24 hours  States she was able to ambulate from the bed to a bedside commode with no direct assistance  Continues to complain of some weakness in the left upper extremity  Patient was advised by primary service, physical therapy and Occupational Therapy to attend inpatient rehab  Patient currently declines as she feels she will be safe at home and that she has a pet with medical needs which she must attend to  Discussed her previous discharge from the hospital following her surgery directly to home and her need to return to the hospital due to increased pain and inability to function at home and the risk of this occurring again  Also discussed that inpatient rehabilitation may improve her function more quickly and also improve her pain more quickly than discharge directly home  Patient considering her options  Opioid requirement previous 24 hours: Dilaudid 8 mg p o , Dilaudid 0 5 mg IV      Meds/Allergies   all current active meds have been reviewed, current meds:   Current Facility-Administered Medications   Medication Dose Route Frequency   • acetaminophen (TYLENOL) tablet 975 mg  975 mg Oral Q8H [facility]   • allopurinol (ZYLOPRIM) tablet 200 mg  200 mg Oral Daily   • atorvastatin (LIPITOR) tablet 40 mg  40 mg Oral Daily   • docusate sodium (COLACE) capsule 100 mg  100 mg Oral BID   • gabapentin (NEURONTIN) capsule 100 mg  100 mg Oral TID   • heparin (porcine) subcutaneous injection 5,000 Units  5,000 Units Subcutaneous Q8H [facility]   • HYDROmorphone (DILAUDID) tablet 2 mg  2 mg Oral Q4H PRN   • HYDROmorphone (DILAUDID) tablet 4 mg  4 mg Oral Q4H PRN   • insulin lispro (HumaLOG) 100 units/mL subcutaneous injection 2-12 Units  2-12 Units Subcutaneous TID AC   • insulin lispro (HumaLOG) 100 units/mL subcutaneous injection 2-12 Units  2-12 Units Subcutaneous HS   • isosorbide mononitrate (IMDUR) 24 hr tablet 30 mg  30 mg Oral Daily   • lidocaine (LIDODERM) 5 % patch 1 patch  1 patch Topical Daily   • methocarbamol (ROBAXIN) tablet 500 mg  500 mg Oral Q6H [address]   • metoprolol tartrate (LOPRESSOR) tablet 25 mg  25 mg Oral Q12H [address]   • oxybutynin (DITROPAN) tablet 5 mg  5 mg Oral BID   • pantoprazole (PROTONIX) EC tablet 40 mg  40 mg Oral Daily   • polyethylene glycol (MIRALAX) packet 17 g  17 g Oral Daily    and PTA meds:   Prior to Admission Medications   Prescriptions Last Dose Informant Patient Reported?  Taking?   allopurinol (ZYLOPRIM) 100 mg tablet   Yes Yes   Sig: Take 200 mg by mouth daily   aspirin 81 mg chewable tablet   Yes Yes   Sig: Chew 81 mg daily   atorvastatin (LIPITOR) 40 mg tablet   Yes Yes   Sig: Take 40 mg by mouth daily   baclofen 10 mg tablet   Yes Yes   Sig: Take 10 mg by mouth 2 (two) times a day as needed for muscle spasms   glimepiride (AMARYL) 4 mg tablet   Yes Yes   Sig: Take 8 mg by mouth every morning before breakfast   isosorbide mononitrate (IMDUR) 30 mg 24 hr tablet   Yes Yes   Sig: Take 30 mg by mouth daily   lisinopril (ZESTRIL) 2 5 mg tablet   Yes Yes   Sig: Take 2 5 mg by mouth daily   metFORMIN (GLUCOPHAGE) 1000 MG tablet   Yes Yes   Sig: Take 1,000 mg by mouth daily with dinner   metFORMIN (GLUCOPHAGE) 500 mg tablet   Yes Yes   Sig: Take 500 mg by mouth daily with breakfast   metoprolol tartrate (LOPRESSOR) 25 mg tablet   Yes Yes   Sig: Take 25 mg by mouth every 12 (twelve) hours   oxybutynin (DITROPAN) 5 mg tablet   Yes Yes   Sig: Take 5 mg by mouth 2 (two) times a day   pantoprazole (PROTONIX) 40 mg tablet   Yes Yes   Sig: Take 40 mg by mouth daily   pioglitazone (ACTOS) 30 mg tablet   Yes Yes   Sig: Take 30 mg by mouth daily      Facility-Administered Medications: None       Allergies   Allergen Reactions   • Latex Rash       Objective     Temp:  [97 7 °F (36 5 °C)] 97 7 °F (36 5 °C)  HR:  [] 75  Resp:  [16-18] 18  BP: (117-134)/(62-93) 117/62    Physical Exam  Gen: Awake and alert, NAD, Does not appear ill  Vital signs reviewed  Nursing notes reviewed  Eyes: PERRL, sclera/conjunctiva normal   ENT: No JVD, trachea midline, moist mucus membranes  CV: Heart normal rhythm and normal rate  No extra systoles  Lungs:  CTA bilaterally  No wheeze  No rhonchi  Skin: Warm, dry  Cap refill <2 seconds  No diaphoresis  Neuro: A&O x 3, GCS 15 (E4, V5, M6)  No obvious focal motor deficit  Psych: Normal speech, normal attention, normal behavior  Lab Results:   Results from last 7 days   Lab Units 05/26/23  0646   HEMATOCRIT % 25 3*   HEMOGLOBIN g/dL 8 4*   PLATELETS Thousands/uL 223   WBC Thousand/uL 6 49      Results from last 7 days   Lab Units 05/26/23  0646   ALK PHOS U/L 111   ALT U/L 32   AST U/L 56*   BUN mg/dL 30*   CALCIUM mg/dL 9 2   CHLORIDE mmol/L 109*   CO2 mmol/L 26   CREATININE mg/dL 1 22   POTASSIUM mmol/L 4 0    Estimated Creatinine Clearance: 58 4 mL/min (by C-G formula based on SCr of 1 22 mg/dL)  Imaging Studies: I have personally reviewed pertinent reports  Counseling / Coordination of Care  Total floor / unit time spent today Level 3 = 55 minutes  Greater than 50% of total time was spent with the patient and / or family counseling and / or coordination of care  A description of the counseling / coordination of care: Patient interview, physical examination, review of medical record, review of imaging and laboratory data, development of pain management plan, discussion of pain management plan with patient and primary service  Explanation of risks and benefits of suggested pain medications  Please note that the APS provides consultative services regarding pain management only    With the exception of ketamine and epidural infusions and except when indicated, final decisions regarding starting or changing doses of analgesic medications are at the discretion of the consulting service  Off hours consultation and/or medication management is generally not available  Portions of the record may have been created with voice recognition software  Occasional wrong-word or 'sound-a-like' substitutions may have occurred due to the inherent limitations of voice recognition software       Mary Hilton PA-C  Acute Pain Service

## 2023-05-26 NOTE — CASE MANAGEMENT
Case Management Assessment & Discharge Planning Note    Patient name Cherlynn Cranker  Location [address] [place] [address] [address] [id number]  : 1955 Date 2023       Current Admission Date: 2023  Current Admission Diagnosis:Cervical pain   Patient Active Problem List    Diagnosis Date Noted   • Cervical pain 2023   • Chronic kidney disease stage III with PHYLLIS 2023   • Type 2 diabetes mellitus with renal complication ([facility] [address] )    • Coronary artery disease without angina pectoris 2023   • Acid reflux 2023   • Lower extremity edema 2023   • Constipation 2023   • Elevated d-dimer 2023   • Anemia 2023      LOS (days): 1  Geometric Mean LOS (GMLOS) (days): 2 80  Days to GMLOS:1 2     OBJECTIVE:    Risk of Unplanned Readmission Score: 12 71     Current admission status: Inpatient    Preferred Pharmacy:   [id number] [address]  Phone: [phone] Fax: [phone]    Primary Care Provider: Marjorie Selby MD    Primary Insurance: MEDICARE  Secondary Insurance: Oksana Colon    ASSESSMENT:  [address] [facility], [address] Representative [place] Friend   Primary Phone: [phone] (Mobile)  Home Phone: [phone]               Advance Directives  Does patient have a [address]?: No  Does patient currently have a Health Care decision maker?: Yes, please see Health Care Proxy section  Does patient have Advance Directives?: No  Primary Contact: Audra Salinas (Friend)     Patient Information  Admitted from[de-identified] Home  Mental Status: Alert  During Assessment patient was accompanied by: Not accompanied during assessment  Assessment information provided by[de-identified] Patient  Primary Caregiver: Self  Support Systems: Self, Family members, Friends/neighbors  Home entry access options   Select all that apply : Stairs  Number of steps to enter home : 3  Type of Current Residence: [address] home  Upon entering residence, is there a bedroom on the main floor (no further steps)?: No  A bedroom is located on the following floor levels of residence (select all that apply):: 3rd Floor  Upon entering residence, is there a bathroom on the main floor (no further steps)?: No  Indicate which floors of current residence have a bathroom (select all the apply):: 2nd Floor  Number of steps to 2nd floor from main floor: One Flight  Number of steps to 3rd floor from main floor: Two Flights  Living Arrangements: Lives w/ Friend    Activities of Daily Living Prior to Admission  Functional Status: Independent  Completes ADLs independently?: Yes  Ambulates independently?: Yes  Does patient use assisted devices?: Yes  Assisted Devices (DME) used: Straight Cane  Does patient currently own DME?: Yes  What DME does the patient currently own?: Straight Cane  Does patient have a history of Outpatient Therapy (PT/OT)?: Yes ( OP therapy [facility])  Does the patient have a history of Short-Term Rehab?: No  Does patient have a history of HHC?: Yes ([facility])  Does patient currently have [facility]?: Yes    Current Home Health Care  Type of Current Home Care Services: Home PT, Home OT, Nurse visit  [address] [place] [address][de-identified] [address] [place] [address] Provider[de-identified] PCP    Patient Information Continued  Does patient have prescription coverage?: Yes  Does patient have a history of substance abuse?: No  Does patient have a history of Mental Health Diagnosis?: No    Means of Transportation  [facility] Transport to [facility][de-identified] Family transport        DISCHARGE DETAILS:    Discharge planning discussed with[de-identified] patient at bedside  Freedom of Choice: Yes  Comments - Freedom of Choice: therapy recommendations discussed with pt, pt requesting blanket STR referral's in her area of residence     Were Treatment Team discharge recommendations reviewed with patient/caregiver?: Yes  Did patient/caregiver verbalize understanding of patient care needs?: Yes  Were patient/caregiver advised of the risks associated with not following Treatment Team discharge recommendations?: Yes    Other Referral/Resources/Interventions Provided:  Interventions: Short Term Rehab  Referral Comments: pt interested in STR and requested blanket referral's to facilities in her area of residence  [facility] referral's placed  Pt also open with [facility] prior to admission for SN/PT/OT  Referral placed to [facility] for JOSE as back up plan   CM will follow for accepting STR facility    Treatment Team Recommendation: Short Term Rehab  Discharge Destination Plan[de-identified] Short Term Rehab

## 2023-05-26 NOTE — ASSESSMENT & PLAN NOTE
CTA PE study negative and main pulmonary artery, distal order branches were not evaluated due to respiratory motion artifact  · Lower extremity duplex negiatve

## 2023-05-26 NOTE — ASSESSMENT & PLAN NOTE
· Continue Tylenol 975 mg p o  every 8 hours scheduled  · Continue gabapentin 100 mg p o  3 times daily scheduled  · Continue lidocaine patch, on for 12 hours and off for 12 hours  · Continue Robaxin 500 mg p o  every 6 hours scheduled  · Continue Dilaudid 2 mg p o  every 4 hours as needed moderate pain or 4 mg p o  every 4 hours as needed severe pain  · Discontinue IV Dilaudid  · Ice or moist heat to painful area for up to 20 minutes every hour as needed  · Do not use heat over site of lidocaine patch  · Continue bowel regimen to avoid opioid-induced constipation while on opioid pain medication  · At discharge, suggest the following:  · Tylenol 975 mg p o  every 8 hours as needed mild pain  · Gabapentin 100 mg p o  3 times daily scheduled  · Lidocaine patch, on for 12 hours and off for 12 hours  · Robaxin 500 mg p o  every 6 hours scheduled x5 days, then as needed for muscle spasm  · Dilaudid 2 mg p o  every 4 hours as needed moderate to severe pain x3 to 4 days, then discontinue  · Bowel regimen while on opioid pain medication  · Follow-up with primary care provider soon as possible after discharge  · Patient is not to take newly prescribed oral Dilaudid with previously prescribed hydrocodone/acetaminophen or oxycodone

## 2023-05-26 NOTE — ASSESSMENT & PLAN NOTE
Presents with cervical pain and b/l LE weakness s/p PCDF C3-T1 on 5/18/23 at [facility] with Dr Jazlyn Madera (discharged 5/20/23)  No dedicated cervical imaging obtained upon admission  · Neurosurgery consulted, input appreciated  · Ordered upright cervical xrays, official results pending however per Nsx read, acceptable alignment and hardware placement  · C-collar in place; per surgeon should be worn at all times during first 2 weeks, after 2 weeks may remove at sleep only for the next 4 weeks  · After third day postop, patient should shower daily and cover incision daily for the first 14 days with a clean dressing  · APS consulted, input appreciated  · Continue Tylenol 975 mg p o  every 8 hours  · Added gabapentin 100 mg p o  3 times daily, added lidocaine patch, added Robaxin 500 mg p o  every 6 hours scheduled  · On admission her p o  oxy was changed to p o  Dilaudid, continue for now      · On IV dilaudid currently, hopeful to wean  · Bowel regimen in place  · CK elevated of note  · Will place on SQ heparin based on Nsx notes   · PT/OT recommend rehab however patient wishes to discharge home if possible as long as her pain is controlled over next several days

## 2023-05-26 NOTE — ASSESSMENT & PLAN NOTE
Sinus on admission EKG, suspect secondary to pain  · CTA negative for PE however distal branches not evaluated due to respiratory motion artifact   · Venous duplex negative  · Consideration for telemetry and further w/u if persists  · Control pain as above

## 2023-05-26 NOTE — ASSESSMENT & PLAN NOTE
• Estimated Creatinine Clearance: 58 4 mL/min (by C-G formula based on SCr of 1 22 mg/dL)  • Will attempt to minimize renally excreted pain medications

## 2023-05-26 NOTE — ED PROVIDER NOTES
History  Chief Complaint   Patient presents with   • Leg Swelling     Pt had recent procedure on cervical spine, pt reports being unable to ambulate at home due to pain in neck radiating down between her shoulder blades  Pt feels this is causing her b/l leg swelling and blisters on feet b/l      Patient is a 71-year-old female, past medical history of diabetes, osteoarthritis, prior MI, status post recent cervical fusion, who presents to the emergency room for lower extremity swelling and persistent neck/back pain  Patient states since getting home from the surgery she has had pain to the neck that radiates to the mid scapula  She has had difficulty ambulating  Today she looked down and saw that her legs were very swollen and had multiple blisters  This prompted call to 911  Besides pain and swelling patient has no other complaints  Denies any fevers or chills  No chest pain or shortness of breath  No belly pain or back pain  No prior history of swelling like this in the past   Denies any history of heart failure  No other complaints or concerns at this time  Chart reviewed  Surgery was performed at [facility] on 5/18/2023  Prior to Admission Medications   Prescriptions Last Dose Informant Patient Reported?  Taking?   allopurinol (ZYLOPRIM) 100 mg tablet   Yes Yes   Sig: Take 200 mg by mouth daily   aspirin 81 mg chewable tablet   Yes Yes   Sig: Chew 81 mg daily   atorvastatin (LIPITOR) 40 mg tablet   Yes Yes   Sig: Take 40 mg by mouth daily   baclofen 10 mg tablet   Yes Yes   Sig: Take 10 mg by mouth 2 (two) times a day as needed for muscle spasms   glimepiride (AMARYL) 4 mg tablet   Yes Yes   Sig: Take 8 mg by mouth every morning before breakfast   isosorbide mononitrate (IMDUR) 30 mg 24 hr tablet   Yes Yes   Sig: Take 30 mg by mouth daily   lisinopril (ZESTRIL) 2 5 mg tablet   Yes Yes   Sig: Take 2 5 mg by mouth daily   metFORMIN (GLUCOPHAGE) 1000 MG tablet   Yes Yes   Sig: Take 1,000 mg by mouth daily with dinner   metFORMIN (GLUCOPHAGE) 500 mg tablet   Yes Yes   Sig: Take 500 mg by mouth daily with breakfast   metoprolol tartrate (LOPRESSOR) 25 mg tablet   Yes Yes   Sig: Take 25 mg by mouth every 12 (twelve) hours   oxybutynin (DITROPAN) 5 mg tablet   Yes Yes   Sig: Take 5 mg by mouth 2 (two) times a day   pantoprazole (PROTONIX) 40 mg tablet   Yes Yes   Sig: Take 40 mg by mouth daily   pioglitazone (ACTOS) 30 mg tablet   Yes Yes   Sig: Take 30 mg by mouth daily      Facility-Administered Medications: None       Past Medical History:   Diagnosis Date   • Diabetes type 2    • MI (myocardial infarction) ([facility] [address] )    • Osteoarthritis        Past Surgical History:   Procedure Laterality Date   • BACK SURGERY     • CORONARY ANGIOPLASTY WITH STENT PLACEMENT N/A    • LAMINECTOMY     • MINIMALLY INVASIVE LUMBAR DECOMPRESSION         Family History   Problem Relation Age of Onset   • Diabetes Mother    • Heart attack Father    • Hodgkin's lymphoma Father         non hodgkins lymphoma   • Other Father         mesothelioma     I have reviewed and agree with the history as documented  E-Cigarette/Vaping     E-Cigarette/Vaping Substances     Social History     Tobacco Use   • Smoking status: Never   • Smokeless tobacco: Never        Review of Systems   Constitutional: Negative for chills and fever  Respiratory: Negative for shortness of breath  Cardiovascular: Positive for leg swelling  Negative for chest pain  Gastrointestinal: Negative for diarrhea, nausea and vomiting  Musculoskeletal: Positive for back pain and neck pain  All other systems reviewed and are negative        Physical Exam  ED Triage Vitals [05/24/23 2237]   Temperature Pulse Respirations Blood Pressure SpO2   98 7 °F (37 1 °C) (!) 117 19 140/64 92 %      Temp Source Heart Rate Source Patient Position - Orthostatic VS BP Location FiO2 (%)   Oral Monitor -- -- --      Pain Score       8             Orthostatic Vital Signs  Vitals:    05/25/23 0625 05/25/23 0803 05/25/23 1428 05/25/23 2222   BP: 144/85 133/75 134/74 130/93   Pulse: (!) 118 (!) 116 102 (!) 109       Physical Exam  Vitals and nursing note reviewed  Constitutional:       Appearance: She is well-developed  She is not ill-appearing, toxic-appearing or diaphoretic  HENT:      Head: Normocephalic and atraumatic  Right Ear: External ear normal       Left Ear: External ear normal       Nose: Nose normal    Eyes:      General: Lids are normal  No scleral icterus  Cardiovascular:      Rate and Rhythm: Regular rhythm  Tachycardia present  Heart sounds: Normal heart sounds  No murmur heard  No friction rub  No gallop  Pulmonary:      Effort: Pulmonary effort is normal  No respiratory distress  Breath sounds: Normal breath sounds  No wheezing or rales  Abdominal:      Palpations: Abdomen is soft  Tenderness: There is no abdominal tenderness  There is no guarding or rebound  Musculoskeletal:         General: No deformity  Normal range of motion  Cervical back: Normal range of motion and neck supple  Right lower leg: Edema present  Left lower leg: Edema present  Comments: There is 3+ pitting edema to the bilateral lower extremities with multiple fluid-filled bullae  Skin:     General: Skin is warm and dry  Neurological:      General: No focal deficit present  Mental Status: She is alert     Psychiatric:         Mood and Affect: Mood normal          Behavior: Behavior normal          ED Medications  Medications   multi-electrolyte (PLASMALYTE-A/ISOLYTE-S PH 7 4) IV solution (0 mL/hr Intravenous Stopped 5/25/23 1720)   acetaminophen (TYLENOL) tablet 975 mg (975 mg Oral Given 5/25/23 2205)   docusate sodium (COLACE) capsule 100 mg (100 mg Oral Given 5/25/23 1720)   polyethylene glycol (MIRALAX) packet 17 g (17 g Oral Given 5/25/23 0932)   allopurinol (ZYLOPRIM) tablet 200 mg (200 mg Oral Given 5/25/23 1720) atorvastatin (LIPITOR) tablet 40 mg (40 mg Oral Given 5/25/23 1720)   isosorbide mononitrate (IMDUR) 24 hr tablet 30 mg (30 mg Oral Given 5/25/23 0938)   metoprolol tartrate (LOPRESSOR) tablet 25 mg (25 mg Oral Given 5/25/23 2205)   oxybutynin (DITROPAN) tablet 5 mg (5 mg Oral Given 5/25/23 1720)   pantoprazole (PROTONIX) EC tablet 40 mg (40 mg Oral Given 5/25/23 0938)   insulin lispro (HumaLOG) 100 units/mL subcutaneous injection 2-12 Units ( Subcutaneous Not Given 5/25/23 1718)   insulin lispro (HumaLOG) 100 units/mL subcutaneous injection 2-12 Units ( Subcutaneous Not Given 5/25/23 2139)   HYDROmorphone (DILAUDID) tablet 4 mg (4 mg Oral Given 5/25/23 2242)   HYDROmorphone (DILAUDID) tablet 2 mg (has no administration in time range)   HYDROmorphone (DILAUDID) injection 0 5 mg (has no administration in time range)   gabapentin (NEURONTIN) capsule 100 mg (100 mg Oral Given 5/25/23 2205)   methocarbamol (ROBAXIN) tablet 500 mg (500 mg Oral Given 5/25/23 2301)   lidocaine (LIDODERM) 5 % patch 1 patch (1 patch Topical Patch Removed 5/25/23 2243)   HYDROmorphone (DILAUDID) injection 0 5 mg (0 5 mg Intravenous Given 5/24/23 2257)   multi-electrolyte (ISOLYTE-S PH 7 4) bolus 500 mL (0 mL Intravenous Stopped 5/25/23 0049)   multi-electrolyte (ISOLYTE-S PH 7 4) bolus 500 mL (0 mL Intravenous Stopped 5/25/23 0210)   iohexol (OMNIPAQUE) 350 MG/ML injection (SINGLE-DOSE) 90 mL (90 mL Intravenous Given 5/25/23 0120)   HYDROmorphone (DILAUDID) injection 0 5 mg (0 5 mg Intravenous Given 5/25/23 0225)   HYDROmorphone (DILAUDID) injection 1 mg (1 mg Intravenous Given 5/25/23 0545)   HYDROmorphone (DILAUDID) injection 0 5 mg (0 5 mg Intravenous Given 5/25/23 1134)       Diagnostic Studies  Results Reviewed     Procedure Component Value Units Date/Time    Basic metabolic panel [[id number]]  (Abnormal) Collected: 05/25/23 1336    Lab Status: Final result Specimen: Blood from Arm, Left Updated: 05/25/23 1431     Sodium 132 mmol/L Potassium 4 4 mmol/L      Chloride 107 mmol/L      CO2 22 mmol/L      ANION GAP 3 mmol/L      BUN 34 mg/dL      Creatinine 1 33 mg/dL      Glucose 141 mg/dL      Calcium 9 0 mg/dL      eGFR 41 ml/min/1 73sq m     Narrative:      [place] guidelines for Chronic Kidney Disease (CKD):   •  Stage 1 with normal or high GFR (GFR > 90 mL/min/1 73 square meters)  •  Stage 2 Mild CKD (GFR = 60-89 mL/min/1 73 square meters)  •  Stage 3A Moderate CKD (GFR = 45-59 mL/min/1 73 square meters)  •  Stage 3B Moderate CKD (GFR = 30-44 mL/min/1 73 square meters)  •  Stage 4 Severe CKD (GFR = 15-29 mL/min/1 73 square meters)  •  Stage 5 End Stage CKD (GFR <15 mL/min/1 73 square meters)  Note: GFR calculation is accurate only with a steady state creatinine    HS Troponin I 4hr [[id number]]  (Normal) Collected: 05/25/23 0225    Lab Status: Final result Specimen: Blood from Arm, Right Updated: 05/25/23 0311     hs TnI 4hr 6 ng/L      Delta 4hr hsTnI 1 ng/L     B-Type Natriuretic Peptide(BNP) [[id number]]  (Normal) Collected: 05/24/23 2247    Lab Status: Final result Specimen: Blood from Arm, Right Updated: 05/25/23 0142     BNP 14 pg/mL     HS Troponin I 2hr [[id number]]  (Normal) Collected: 05/25/23 0011    Lab Status: Final result Specimen: Blood from Arm, Right Updated: 05/25/23 0127     hs TnI 2hr 5 ng/L      Delta 2hr hsTnI 0 ng/L     CK [[id number]]  (Abnormal) Collected: 05/24/23 2247    Lab Status: Final result Specimen: Blood from Arm, Right Updated: 05/25/23 0008     Total CK 1,358 U/L     HS Troponin 0hr (reflex protocol) [[id number]]  (Normal) Collected: 05/24/23 2247    Lab Status: Final result Specimen: Blood from Arm, Right Updated: 05/24/23 2354     hs TnI 0hr 5 ng/L     D-dimer, quantitative [[id number]]  (Abnormal) Collected: 05/24/23 2247    Lab Status: Final result Specimen: Blood from Arm, Right Updated: 05/24/23 5585     D-Dimer, Quant 2 64 ug/ml FEU     Narrative:       In the evaluation for possible pulmonary embolism, in the appropriate (Well's Score of 4 or less) patient, the age adjusted d-dimer cutoff for this patient can be calculated as:    Age x 0 01 (in ug/mL) for Age-adjusted D-dimer exclusion threshold for a patient over 50 years      Comprehensive metabolic panel [[id number]]  (Abnormal) Collected: 05/24/23 2247    Lab Status: Final result Specimen: Blood from Arm, Right Updated: 05/24/23 2345     Sodium 132 mmol/L      Potassium 3 9 mmol/L      Chloride 104 mmol/L      CO2 24 mmol/L      ANION GAP 4 mmol/L      BUN 39 mg/dL      Creatinine 1 62 mg/dL      Glucose 147 mg/dL      Calcium 9 3 mg/dL      Corrected Calcium 10 2 mg/dL      AST 82 U/L      ALT 41 U/L      Alkaline Phosphatase 134 U/L      Total Protein 6 9 g/dL      Albumin 2 9 g/dL      Total Bilirubin 0 40 mg/dL      eGFR 32 ml/min/1 73sq m     Narrative:      National Kid[employer] Disease Foundation guidelines for Chronic Kidney Disease (CKD):   •  Stage 1 with normal or high GFR (GFR > 90 mL/min/1 73 square meters)  •  Stage 2 Mild CKD (GFR = 60-89 mL/min/1 73 square meters)  •  Stage 3A Moderate CKD (GFR = 45-59 mL/min/1 73 square meters)  •  Stage 3B Moderate CKD (GFR = 30-44 mL/min/1 73 square meters)  •  Stage 4 Severe CKD (GFR = 15-29 mL/min/1 73 square meters)  •  Stage 5 End Stage CKD (GFR <15 mL/min/1 73 square meters)  Note: GFR calculation is accurate only with a steady state creatinine    CBC and differential [[id number]]  (Abnormal) Collected: 05/24/23 2247    Lab Status: Final result Specimen: Blood from Arm, Right Updated: 05/24/23 2331     WBC 8 44 Thousand/uL      RBC 3 21 Million/uL      Hemoglobin 9 3 g/dL      Hematocrit 29 9 %      MCV 93 fL      MCH 29 0 pg      MCHC 31 1 g/dL      RDW 15 4 %      MPV 11 1 fL      Platelets 724 Thousands/uL      nRBC 0 /100 WBCs      Neutrophils Relative 71 %      Immat GRANS % 1 %      Lymphocytes Relative 16 %      Monocytes Relative 9 %      Eosinophils Relative 3 % Basophils Relative 0 %      Neutrophils Absolute 5 82 Thousands/µL      Immature Grans Absolute 0 08 Thousand/uL      Lymphocytes Absolute 1 32 Thousands/µL      Monocytes Absolute 0 69 Thousand/µL      Eosinophils Absolute 0 21 Thousand/µL      Basophils Absolute 0 03 Thousands/µL     Narrative: This is an appended report  These results have been appended to a previously verified report  VAS lower limb venous duplex study, complete bilateral   Final Result by Clayton Mtz DO (05/25 1638)      CTA ED chest PE Study   Final Result by Cheryle Shorter, MD (05/25 0207)         1  No evidence of main pulmonary artery embolus  Distal order branches not diagnostically evaluated due to significant respiratory motion artifact  2  No acute cardiopulmonary process  Workstation performed: [id number]         XR spine cervical 2 or 3 vw injury    (Results Pending)         Procedures  ECG 12 Lead Documentation Only    Date/Time: 5/25/2023 11:38 PM    Performed by: Glenn Saez DO  Authorized by: Glenn Saez DO    ECG reviewed by me, the ED Provider: yes    Patient location:  ED  Interpretation:     Interpretation: abnormal    Rate:     ECG rate:  107    ECG rate assessment: tachycardic    Rhythm:     Rhythm: sinus tachycardia    Ectopy:     Ectopy: none    QRS:     QRS axis:  Normal  Conduction:     Conduction: normal    ST segments:     ST segments:  Normal  T waves:     T waves: normal    Comments:      Low voltage          ED Course  ED Course as of 05/25/23 2346   Wed May 24, 2023   2326 WBC: 8 44   2326 Hemoglobin(!): 9 3   2345 Creatinine(!): 1 62   2345 Sodium(!): 132   2355 D-Dimer, Quant(!): 2 64   Thu May 25, 2023   0008 Total CK(!): 1,358   0143 BNP: 14   0209 CTA ED chest PE Study  1  No evidence of main pulmonary artery embolus  Distal order branches not diagnostically evaluated due to significant respiratory motion artifact    2  No acute cardiopulmonary "process  3241 Patient reevaluated  States she is still in pain  Remains tachycardic  Will discuss with SLIM about admission  6399 Discussed with SLIM  Accepts admission         CRAFFT    Flowsheet Row Most Recent Value   CRAFFT Initial Screen: During the past 12 months, did you:    1  Drink any alcohol (more than a few sips)? No Filed at: 05/24/2023 2240   2  Smoke any marijuana or hashish No Filed at: 05/24/2023 2240   3  Use anything else to get high? (\"anything else\" includes illegal drugs, over the counter and prescription drugs, and things that you sniff or 'bravo')? No Filed at: 05/24/2023 2240                          SBIRT 20yo+    Flowsheet Row Most Recent Value   Initial Alcohol Screen: US AUDIT-C     1  How often do you have a drink containing alcohol? 0 Filed at: 05/24/2023 2240   2  How many drinks containing alcohol do you have on a typical day you are drinking? 0 Filed at: 05/24/2023 2240   3a  Male UNDER 65: How often do you have five or more drinks on one occasion? 0 Filed at: 05/24/2023 2240   3b  FEMALE Any Age, or MALE 65+: How often do you have 4 or more drinks on one occassion? 0 Filed at: 05/24/2023 2240   Audit-C Score 0 Filed at: 05/24/2023 2240   TRINY: How many times in the past year have you    Used an illegal drug or used a prescription medication for non-medical reasons? Never Filed at: 05/24/2023 2240                Medical Decision Making  Patient is a 78 y o  female who presents to the ED for back/neck pain, as well as bilateral lower extremity swelling with bullae  Patient is nontoxic, well-appearing  She is tachycardic but otherwise vitals are stable       Patient's pain is likely secondary to her recent procedure  Unclear etiology of swelling  Differential includes lymphedema, volume overload/CHF  Possibly from decreased movement/laying flat for longer periods of time  Presentation not consistent with cellulitis, NSTI      Plan: Labs, D-dimer (given tachycardia and " "recent procedure), pain control as needed, reassessment                 Portions of the record may have been created with voice recognition software  Occasional wrong word or \"sound a like\" substitutions may have occurred due to the inherent limitations of voice recognition software  Read the chart carefully and recognize, using context, where substitutions have occurred  PHYLLIS (acute kidney injury) [facility]): acute illness or injury  Amount and/or Complexity of Data Reviewed  Labs: ordered  Decision-making details documented in ED Course  Radiology: ordered  Decision-making details documented in ED Course  Risk  Prescription drug management  Decision regarding hospitalization              Disposition  Final diagnoses:   PHYLLIS (acute kidney injury) ([facility] [address] )   Lower extremity edema   Status post cervical spinal fusion   Elevated CK     Time reflects when diagnosis was documented in both MDM as applicable and the Disposition within this note     Time User Action Codes Description Comment    5/25/2023  2:13 AM [place] Gambler Add [N17 9] [facility]     5/25/2023  5:06 AM [place] Lang Add [R60 0] Lower extremity edema     5/25/2023  5:06 AM [place] Lang Add [N17 9] Chronic kidney disease stage III with PHYLLIS     5/25/2023  5:06 AM Antolin Lang Add [I25 2] History of MI (myocardial infarction)     5/25/2023  5:06 AM [place] Lang Add [E11 29] Type 2 diabetes mellitus with renal complication ([facility] )     2/71/7990  5:07 AM [place] Lang Add [M54 2] Cervical pain     5/25/2023  5:07 AM Antolin Lang Remove [E11 29] Type 2 diabetes mellitus with renal complication ([facility] )     9/80/5470  5:07 AM [place] Lang Remove [I25 2] History of MI (myocardial infarction)     5/25/2023  5:23 AM [place] Lang Add [E11 29] Type 2 diabetes mellitus with renal complication ([facility] )     0/36/8490 11:40 PM [place] Gambler Modify [R60 0] Lower extremity edema     5/25/2023 11:40 PM [place] Gambler Add [Z98 1] " Status post cervical spinal fusion     5/25/2023 11:41 PM Agustin Ryan Add [R74 8] Elevated CK       ED Disposition     ED Disposition   Admit    Condition   Stable    Date/Time   Thu May 25, 2023 11:40 PM    Comment   Case was discussed with Dr Alexey Herring  and the patient's admission status was agreed to be Admission Status: inpatient status to the service of Dr Alexey Herring   Follow-up Information    None         Current Discharge Medication List      CONTINUE these medications which have NOT CHANGED    Details   allopurinol (ZYLOPRIM) 100 mg tablet Take 200 mg by mouth daily      aspirin 81 mg chewable tablet Chew 81 mg daily      atorvastatin (LIPITOR) 40 mg tablet Take 40 mg by mouth daily      baclofen 10 mg tablet Take 10 mg by mouth 2 (two) times a day as needed for muscle spasms      glimepiride (AMARYL) 4 mg tablet Take 8 mg by mouth every morning before breakfast      isosorbide mononitrate (IMDUR) 30 mg 24 hr tablet Take 30 mg by mouth daily      lisinopril (ZESTRIL) 2 5 mg tablet Take 2 5 mg by mouth daily      !! metFORMIN (GLUCOPHAGE) 1000 MG tablet Take 1,000 mg by mouth daily with dinner      !! metFORMIN (GLUCOPHAGE) 500 mg tablet Take 500 mg by mouth daily with breakfast      metoprolol tartrate (LOPRESSOR) 25 mg tablet Take 25 mg by mouth every 12 (twelve) hours      oxybutynin (DITROPAN) 5 mg tablet Take 5 mg by mouth 2 (two) times a day      pantoprazole (PROTONIX) 40 mg tablet Take 40 mg by mouth daily      pioglitazone (ACTOS) 30 mg tablet Take 30 mg by mouth daily       ! ! - Potential duplicate medications found  Please discuss with provider  No discharge procedures on file  PDMP Review       Value Time User    PDMP Reviewed  Yes 5/25/2023 11:28 AM Massiel Mayorga PA-C           ED Provider  Attending physically available and evaluated [facility]  I managed the patient along with the ED Attending      Electronically Signed by         Grant Dominguez DO  05/25/23 6682

## 2023-05-26 NOTE — ASSESSMENT & PLAN NOTE
Baseline appears around 1 0-1 2; upon discharge on 5/20 was 1 27  Presented with creatinine 1 62   She received contrast for PE study in ER  · Has been on gentle IVF (75 mL/hr) since admission--will now discontinue  · Holding ACE-inhibitor  · Urinary retention protocol in place--required straight cath x1 on 5/25 PM, monitor for ability to void  · Suspect could be partially related to her constipation   · Creat improving

## 2023-05-26 NOTE — ASSESSMENT & PLAN NOTE
"No results found for: \"HGBA1C\"    Recent Labs     05/25/23  1117 05/25/23  1601 05/25/23 2055 05/26/23  0714   POCGLU 138 149* 145* 130       Blood Sugar Average: Last 72 hrs:  · Pt reports A1c of 6 0 on April 5th 2023   Despite well controlled DM, she reports chronic neuropathy   · Home meds held  (metformin 500 mg in the morning, 1000 mg in the evening, glimepiride 8 mg once a day,Pioglitazone hcl 30 mg daily)  · SSI/accuchecks  · Gabapentin initiated by APS   (P) 137 2    "

## 2023-05-27 LAB
ANION GAP SERPL CALCULATED.3IONS-SCNC: 2 MMOL/L (ref 4–13)
ANISOCYTOSIS BLD QL SMEAR: PRESENT
BASOPHILS # BLD MANUAL: 0 THOUSAND/UL (ref 0–0.1)
BASOPHILS NFR MAR MANUAL: 0 % (ref 0–1)
BUN SERPL-MCNC: 28 MG/DL (ref 5–25)
CALCIUM SERPL-MCNC: 9.1 MG/DL (ref 8.3–10.1)
CHLORIDE SERPL-SCNC: 110 MMOL/L (ref 96–108)
CO2 SERPL-SCNC: 26 MMOL/L (ref 21–32)
CREAT SERPL-MCNC: 1.17 MG/DL (ref 0.6–1.3)
DACRYOCYTES BLD QL SMEAR: PRESENT
EOSINOPHIL # BLD MANUAL: 0.2 THOUSAND/UL (ref 0–0.4)
EOSINOPHIL NFR BLD MANUAL: 3 % (ref 0–6)
ERYTHROCYTE [DISTWIDTH] IN BLOOD BY AUTOMATED COUNT: 15.4 % (ref 11.6–15.1)
GFR SERPL CREATININE-BSD FRML MDRD: 48 ML/MIN/1.73SQ M
GLUCOSE SERPL-MCNC: 125 MG/DL (ref 65–140)
GLUCOSE SERPL-MCNC: 140 MG/DL (ref 65–140)
GLUCOSE SERPL-MCNC: 147 MG/DL (ref 65–140)
GLUCOSE SERPL-MCNC: 157 MG/DL (ref 65–140)
GLUCOSE SERPL-MCNC: 175 MG/DL (ref 65–140)
HCT VFR BLD AUTO: 25.9 % (ref 34.8–46.1)
HGB BLD-MCNC: 8.5 G/DL (ref 11.5–15.4)
LYMPHOCYTES # BLD AUTO: 0.99 THOUSAND/UL (ref 0.6–4.47)
LYMPHOCYTES # BLD AUTO: 15 % (ref 14–44)
MCH RBC QN AUTO: 30.2 PG (ref 26.8–34.3)
MCHC RBC AUTO-ENTMCNC: 32.8 G/DL (ref 31.4–37.4)
MCV RBC AUTO: 92 FL (ref 82–98)
MONOCYTES # BLD AUTO: 0.13 THOUSAND/UL (ref 0–1.22)
MONOCYTES NFR BLD: 2 % (ref 4–12)
NEUTROPHILS # BLD MANUAL: 4.94 THOUSAND/UL (ref 1.85–7.62)
NEUTS BAND NFR BLD MANUAL: 2 % (ref 0–8)
NEUTS SEG NFR BLD AUTO: 73 % (ref 43–75)
PLATELET # BLD AUTO: 216 THOUSANDS/UL (ref 149–390)
PLATELET BLD QL SMEAR: ADEQUATE
PMV BLD AUTO: 10.4 FL (ref 8.9–12.7)
POLYCHROMASIA BLD QL SMEAR: PRESENT
POTASSIUM SERPL-SCNC: 4.1 MMOL/L (ref 3.5–5.3)
RBC # BLD AUTO: 2.81 MILLION/UL (ref 3.81–5.12)
RBC MORPH BLD: PRESENT
SODIUM SERPL-SCNC: 138 MMOL/L (ref 135–147)
VARIANT LYMPHS # BLD AUTO: 5 %
WBC # BLD AUTO: 6.58 THOUSAND/UL (ref 4.31–10.16)

## 2023-05-27 PROCEDURE — 97112 NEUROMUSCULAR REEDUCATION: CPT

## 2023-05-27 PROCEDURE — 82948 REAGENT STRIP/BLOOD GLUCOSE: CPT

## 2023-05-27 PROCEDURE — 80048 BASIC METABOLIC PNL TOTAL CA: CPT | Performed by: INTERNAL MEDICINE

## 2023-05-27 PROCEDURE — 85007 BL SMEAR W/DIFF WBC COUNT: CPT | Performed by: INTERNAL MEDICINE

## 2023-05-27 PROCEDURE — 99232 SBSQ HOSP IP/OBS MODERATE 35: CPT | Performed by: INTERNAL MEDICINE

## 2023-05-27 PROCEDURE — 97116 GAIT TRAINING THERAPY: CPT

## 2023-05-27 PROCEDURE — 85027 COMPLETE CBC AUTOMATED: CPT | Performed by: INTERNAL MEDICINE

## 2023-05-27 PROCEDURE — 97530 THERAPEUTIC ACTIVITIES: CPT

## 2023-05-27 RX ADMIN — ISOSORBIDE MONONITRATE 30 MG: 30 TABLET, EXTENDED RELEASE ORAL at 09:15

## 2023-05-27 RX ADMIN — HEPARIN SODIUM 7500 UNITS: 5000 INJECTION INTRAVENOUS; SUBCUTANEOUS at 14:22

## 2023-05-27 RX ADMIN — OXYBUTYNIN CHLORIDE 5 MG: 5 TABLET ORAL at 17:00

## 2023-05-27 RX ADMIN — GABAPENTIN 100 MG: 100 CAPSULE ORAL at 21:12

## 2023-05-27 RX ADMIN — ACETAMINOPHEN 975 MG: 325 TABLET ORAL at 14:22

## 2023-05-27 RX ADMIN — METOPROLOL TARTRATE 25 MG: 25 TABLET, FILM COATED ORAL at 21:12

## 2023-05-27 RX ADMIN — INSULIN LISPRO 2 UNITS: 100 INJECTION, SOLUTION INTRAVENOUS; SUBCUTANEOUS at 12:07

## 2023-05-27 RX ADMIN — METHOCARBAMOL TABLETS 500 MG: 500 TABLET, COATED ORAL at 06:43

## 2023-05-27 RX ADMIN — HEPARIN SODIUM 7500 UNITS: 5000 INJECTION INTRAVENOUS; SUBCUTANEOUS at 06:45

## 2023-05-27 RX ADMIN — HEPARIN SODIUM 7500 UNITS: 5000 INJECTION INTRAVENOUS; SUBCUTANEOUS at 21:13

## 2023-05-27 RX ADMIN — ATORVASTATIN CALCIUM 40 MG: 40 TABLET, FILM COATED ORAL at 16:57

## 2023-05-27 RX ADMIN — METHOCARBAMOL TABLETS 500 MG: 500 TABLET, COATED ORAL at 00:28

## 2023-05-27 RX ADMIN — DOCUSATE SODIUM 100 MG: 100 CAPSULE, LIQUID FILLED ORAL at 09:15

## 2023-05-27 RX ADMIN — METHOCARBAMOL TABLETS 500 MG: 500 TABLET, COATED ORAL at 12:07

## 2023-05-27 RX ADMIN — ACETAMINOPHEN 975 MG: 325 TABLET ORAL at 06:43

## 2023-05-27 RX ADMIN — OXYBUTYNIN CHLORIDE 5 MG: 5 TABLET ORAL at 09:15

## 2023-05-27 RX ADMIN — METHOCARBAMOL TABLETS 500 MG: 500 TABLET, COATED ORAL at 17:00

## 2023-05-27 RX ADMIN — POLYETHYLENE GLYCOL 3350 17 G: 17 POWDER, FOR SOLUTION ORAL at 09:16

## 2023-05-27 RX ADMIN — GABAPENTIN 100 MG: 100 CAPSULE ORAL at 16:56

## 2023-05-27 RX ADMIN — HYDROMORPHONE HYDROCHLORIDE 2 MG: 2 TABLET ORAL at 00:30

## 2023-05-27 RX ADMIN — PANTOPRAZOLE SODIUM 40 MG: 40 TABLET, DELAYED RELEASE ORAL at 06:43

## 2023-05-27 RX ADMIN — ALLOPURINOL 200 MG: 100 TABLET ORAL at 16:57

## 2023-05-27 RX ADMIN — METOPROLOL TARTRATE 25 MG: 25 TABLET, FILM COATED ORAL at 09:15

## 2023-05-27 RX ADMIN — LIDOCAINE 5% 1 PATCH: 700 PATCH TOPICAL at 09:16

## 2023-05-27 RX ADMIN — GABAPENTIN 100 MG: 100 CAPSULE ORAL at 09:15

## 2023-05-27 RX ADMIN — ACETAMINOPHEN 975 MG: 325 TABLET ORAL at 21:12

## 2023-05-27 NOTE — CASE MANAGEMENT
Case Management Discharge Planning Note    Patient name Zachary Tijerina  Location [address] [place] [address] [id number] MRN [id number]  : 1955 Date 2023       Current Admission Date: 2023  Current Admission Diagnosis:Cervical pain   Patient Active Problem List    Diagnosis Date Noted   • Cervical pain 2023   • Chronic kidney disease stage III with PHYLLIS 2023   • Type 2 diabetes mellitus with renal complication ([facility] [address] )    • Coronary artery disease without angina pectoris 2023   • Acid reflux 2023   • Lower extremity edema 2023   • Constipation 2023   • Elevated d-dimer 2023   • Anemia 2023      LOS (days): 2  Geometric Mean LOS (GMLOS) (days): 2 80  Days to GMLOS:0 3     OBJECTIVE:  Risk of Unplanned Readmission Score: 12 87         Current admission status: Inpatient   Preferred Pharmacy:   [facility]/pharmacy [address] [place] [address]   Lanette CLEMONS [id number]  Phone: [phone] Fax: [phone]    Primary Care Provider: Althea Ross MD    Primary Insurance: MEDICARE  [facility]    DISCHARGE DETAILS:    Other Referral/Resources/Interventions Provided:  Interventions: [facility]  Referral Comments: CM met with pt at bedside in order to discuss updated therapy recommendations for home w/ HHC  Pt interested in [facility] as after care plan  Referral in for thelma and his pending final determination at this time  CM will follow for final acceptance  DME recommendations discussed  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

## 2023-05-27 NOTE — ASSESSMENT & PLAN NOTE
Presents with cervical pain and b/l LE weakness s/p PCDF C3-T1 on 5/18/23 at [facility] with Dr Lucina Madera (discharged 5/20/23)  No dedicated cervical imaging obtained upon admission  · Neurosurgery consulted, input appreciated  · Ordered upright cervical xrays--per Nsx acceptable alignment and hardware placement   · C-collar in place; per surgeon should be worn at all times during first 2 weeks, after 2 weeks may remove at sleep only for the next 4 weeks  · After third day postop, patient should shower daily and cover incision daily for the first 14 days with a clean dressing  · APS consulted, input appreciated  · Continue Tylenol 975 mg p o  every 8 hours  · Added gabapentin 100 mg p o  3 times daily, added lidocaine patch, added Robaxin 500 mg p o  every 6 hours scheduled  · On admission her p o  oxy was changed to p o  Dilaudid, continue for now    · Bowel regimen in place  · Continue SQ Heparin  · PT/OT recommend rehab however patient wishes to discharge home if possible as long as her pain is controlled with ambulation

## 2023-05-27 NOTE — ASSESSMENT & PLAN NOTE
Hgb 9 3 on admission, potentially related to recent surgery  · monitor CBC, slight decrease, probably related to IVF

## 2023-05-27 NOTE — ASSESSMENT & PLAN NOTE
"No results found for: \"HGBA1C\"    Recent Labs     05/26/23  1146 05/26/23  1655 05/26/23  2041 05/27/23  0901   POCGLU 130 143* 174* 175*       Blood Sugar Average: Last 72 hrs:  · Pt reports A1c of 6 0 on April 5th 2023   Despite well controlled DM, she reports chronic neuropathy   · Home meds held  (metformin 500 mg in the morning, 1000 mg in the evening, glimepiride 8 mg once a day,Pioglitazone hcl 30 mg daily)  · SSI/accuchecks  · Gabapentin initiated by APS   (P) [phone]    "

## 2023-05-27 NOTE — PLAN OF CARE
Problem: PHYSICAL THERAPY ADULT  Goal: Performs mobility at highest level of function for planned discharge setting  See evaluation for individualized goals  Description: Treatment/Interventions: Functional transfer training, LE strengthening/ROM, Elevations, Therapeutic exercise, Endurance training, Patient/family training, Equipment eval/education, Bed mobility, Gait training, Compensatory technique education, Spoke to nursing, OT          See flowsheet documentation for full assessment, interventions and recommendations  Outcome: Progressing  Note: Prognosis: Good  Problem List: Decreased endurance, Impaired balance, Decreased mobility, Obesity, Orthopedic restrictions, Pain, Decreased skin integrity  Assessment: Today the patient is demonstrating much improvement in mobility as she required assistance only to get her legs off of the bed  She performed all transfers and gait without physical assistance  She was trialed on room air with SPO2 of 89-95% throughout the session  She noted no dizziness, lightheadedness, nor dyspnea at all  She demonstrated safe ambulation, and verbal instruction was provided for erickson and taking conservative rests  The patient was able to readily demonstrate the stairs, and these were limited to three as the patient needed to urgently utilize the bathroom  She has demonstrated the necessary mobility in order to return home safely, and the patient notes that she feels confident about returning home with continued home therapies  Barriers to Discharge: None     PT Discharge Recommendation: Home with home health rehabilitation    See flowsheet documentation for full assessment

## 2023-05-27 NOTE — PROGRESS NOTES
[facility]  Progress Note  Name: Nuha Arriaza I  MRN: [id number]  [facility] Date of Admission: 5/24/2023   Date of Service: 5/27/2023 I Hospital Day: 2    Assessment/Plan   * Cervical pain  Assessment & Plan  Presents with cervical pain and b/l LE weakness s/p PCDF C3-T1 on 5/18/23 at [facility] with Dr Yunier Madera (discharged 5/20/23)  No dedicated cervical imaging obtained upon admission  · Neurosurgery consulted, input appreciated  · Ordered upright cervical xrays--per Nsx acceptable alignment and hardware placement   · C-collar in place; per surgeon should be worn at all times during first 2 weeks, after 2 weeks may remove at sleep only for the next 4 weeks  · After third day postop, patient should shower daily and cover incision daily for the first 14 days with a clean dressing  · APS consulted, input appreciated  · Continue Tylenol 975 mg p o  every 8 hours  · Added gabapentin 100 mg p o  3 times daily, added lidocaine patch, added Robaxin 500 mg p o  every 6 hours scheduled  · On admission her p o  oxy was changed to p o  Dilaudid, continue for now  · Bowel regimen in place  · Continue SQ Heparin  · PT/OT recommend rehab however patient wishes to discharge home if possible as long as her pain is controlled with ambulation         Lower extremity edema  Assessment & Plan  Presents with increased LE edema with bullae bilateral lower extremities  Not on diuretics at baseline and no echo on file since 2012   Likely 2/2 recent surgical procedure and sitting with legs in dependent position  · Echo without any significant findings, BNP normal  · LE venous duplex negative  · Elevate lower extremities  · Wound care consulted     Anemia  Assessment & Plan  Hgb 9 3 on admission, potentially related to recent surgery  · monitor CBC, slight decrease, probably related to IVF     Elevated d-dimer  Assessment & Plan  CTA PE study negative and main pulmonary "artery, distal order branches were not evaluated due to respiratory motion artifact  · Lower extremity duplex negiatve    Constipation  Assessment & Plan  2/2 surgical procedure  · Bowel regimen ordered  · Had small BM 5/26    Acid reflux  Assessment & Plan  · Continue pantoprazole 40 mg daily    Coronary artery disease without angina pectoris  Assessment & Plan  Patient reports MI history, in 2010 with 1 stent placement  · Continue statin, BB, imdur  · Currently holding aspirin 81 mg daily, status post surgery--resume when cleared by her surgeon    Type 2 diabetes mellitus with renal complication [facility])  Assessment & Plan  No results found for: \"HGBA1C\"    Recent Labs     05/26/23  1146 05/26/23  1655 05/26/23  2041 05/27/23  0901   POCGLU 130 143* 174* 175*       Blood Sugar Average: Last 72 hrs:  · Pt reports A1c of 6 0 on April 5th 2023  Despite well controlled DM, she reports chronic neuropathy   · Home meds held  (metformin 500 mg in the morning, 1000 mg in the evening, glimepiride 8 mg once a day,Pioglitazone hcl 30 mg daily)  · SSI/accuchecks  · Gabapentin initiated by APS   (P) [phone]      Chronic kidney disease stage III with PHYLLIS  Assessment & Plan  Baseline appears around 1 0-1 2; upon discharge on 5/20 was 1 27  Presented with creatinine 1 62   She received contrast for PE study in ER  · Was initially placed on gentle IVF (75 mL/hr) upon admission  · Holding ACE-inhibitor, can likely resume at discharge   · Urinary retention protocol in place--required straight cath x1 on 5/25 PM however now improved   · Suspect could be partially related to her constipation   · Creat improving     Hyponatremia-resolved as of 5/26/2023  Assessment & Plan  Mild on admission, ?dehdyration given elevated BUN/Cr as well  · Now resolved     Tachycardia-resolved as of 5/26/2023  Assessment & Plan  Sinus on admission EKG, suspect secondary to pain  · CTA negative for PE however distal branches not evaluated due to " respiratory motion artifact   · Venous duplex negative  · Consideration for telemetry and further w/u if persists  · Control pain as above              VTE Pharmacologic Prophylaxis: VTE Score: 7 Moderate Risk (Score 3-4) - Pharmacological DVT Prophylaxis Ordered: heparin  Patient Centered Rounds: I performed bedside rounds with nursing staff today  Discussions with Specialists or Other Care Team Provider: will d/w CM    Education and Discussions with Family / Patient: Patient declined call to   Total Time Spent on Date of Encounter in care of patient: 25 minutes This time was spent on one or more of the following: performing physical exam; counseling and coordination of care; obtaining or reviewing history; documenting in the medical record; reviewing/ordering tests, medications or procedures; communicating with other healthcare professionals and discussing with patient's family/caregivers  Current Length of Stay: 2 day(s)  Current Patient Status: Inpatient   Certification Statement: The patient will continue to require additional inpatient hospital stay due to work with PT again today, eval for home vs rehab   Discharge Plan: Anticipate discharge later today or tomorrow to home vs rehab pending progress     Code Status: Level 1 - Full Code    Subjective:   Doing better today  Pain overall more controlled  She had small bowel movement yesterday and has not had any issues urinating  Does not feel short of breath  She is hopeful to be able to walk more with PT today and be cleared to go home  Objective:     Vitals:   Temp (24hrs), Av 8 °F (37 1 °C), Min:97 9 °F (36 6 °C), Max:99 3 °F (37 4 °C)    Temp:  [97 9 °F (36 6 °C)-99 3 °F (37 4 °C)] 99 °F (37 2 °C)  HR:  [] 84  Resp:  [16-19] 19  BP: (114-132)/(60-71) 132/60  SpO2:  [90 %-96 %] 96 %  Body mass index is 46 47 kg/m²  Input and Output Summary (last 24 hours):      Intake/Output Summary (Last 24 hours) at 2023 [facility] filed at 5/27/2023 0601  Gross per 24 hour   Intake 200 ml   Output 225 ml   Net -25 ml       Physical Exam:   Physical Exam  Vitals and nursing note reviewed  Constitutional:       General: She is not in acute distress  Appearance: She is obese  Comments: With cervical collar in place, on 2L via NC    Cardiovascular:      Rate and Rhythm: Normal rate and regular rhythm  Pulmonary:      Effort: No respiratory distress  Abdominal:      General: There is no distension  Tenderness: There is no abdominal tenderness  Musculoskeletal:      Right lower leg: Edema present  Left lower leg: Edema present  Comments: Bilateral lower extremities wrapped    Neurological:      Mental Status: She is oriented to person, place, and time     Psychiatric:         Mood and Affect: Mood normal           Additional Data:     Labs:  Results from last 7 days   Lab Units 05/27/23  0551 05/26/23  0646   BANDS PCT % 2  --    EOS PCT % 3 4   HEMATOCRIT % 25 9* 25 3*   HEMOGLOBIN g/dL 8 5* 8 4*   LYMPHS PCT %  --  19   LYMPHO PCT % 15  --    MONOS PCT %  --  9   MONO PCT % 2*  --    NEUTROS PCT %  --  66   PLATELETS Thousands/uL 216 223   WBC Thousand/uL 6 58 6 49     Results from last 7 days   Lab Units 05/27/23  0551 05/26/23  0646   ANION GAP mmol/L 2* 1*   ALBUMIN g/dL  --  2 4*   ALK PHOS U/L  --  111   ALT U/L  --  32   AST U/L  --  56*   BUN mg/dL 28* 30*   CALCIUM mg/dL 9 1 9 2   CHLORIDE mmol/L 110* 109*   CO2 mmol/L 26 26   CREATININE mg/dL 1 17 1 22   GLUCOSE RANDOM mg/dL 140 129   POTASSIUM mmol/L 4 1 4 0   SODIUM mmol/L 138 136   TOTAL BILIRUBIN mg/dL  --  0 43         Results from last 7 days   Lab Units 05/27/23  0901 05/26/23  2041 05/26/23  1655 05/26/23  1146 05/26/23  0714 05/25/23  2055 05/25/23  1601 05/25/23  1117 05/25/23  0848   POC GLUCOSE mg/dl 175* 174* 143* 130 130 145* 149* 138 124               Lines/Drains:  Invasive Devices     Peripheral Intravenous Line  Duration Peripheral IV 05/24/23 Right Antecubital 2 days          Drain  Duration           External Urinary Catheter 1 day                      Imaging: No pertinent imaging reviewed  Recent Cultures (last 7 days):         Last 24 Hours Medication List:   Current Facility-Administered Medications   Medication Dose Route Frequency Provider Last Rate   • acetaminophen  975 mg Oral [facility] Rubye Hand, CRNP     • allopurinol  200 mg Oral Daily Rubye Hand, CRNP     • atorvastatin  40 mg Oral Daily Rubye Hand, CRNP     • docusate sodium  100 mg Oral BID Rubye Hand, CRNP     • gabapentin  100 mg Oral TID Lauren Kendall PA-C     • heparin (porcine)  7,500 Units Subcutaneous [facility] Lashaun Ramirez PA-C     • HYDROmorphone  2 mg Oral Q4H PRN Rubye Hand, CRNP     • HYDROmorphone  4 mg Oral Q4H PRN Rubye Hand, CRNP     • insulin lispro  2-12 Units Subcutaneous TID AC Rubye Hand, CRNP     • insulin lispro  2-12 Units Subcutaneous HS Rubye Hand, CRNP     • isosorbide mononitrate  30 mg Oral Daily Rubye Hand, CRNP     • lidocaine  1 patch Topical Daily Lauren Kendall PA-C     • methocarbamol  500 mg Oral Q6H [facility] Lauren Kendall PA-C     • metoprolol tartrate  25 mg Oral Q12H [facility] Rubye Hand, CRNP     • oxybutynin  5 mg Oral BID Rubye Hand, CRNP     • pantoprazole  40 mg Oral Daily Rubye Hand, CRNP     • polyethylene glycol  17 g Oral Daily Rubye Hand, CRNP          Today, Patient Was Seen By: Lashaun Ramirez PA-C    **Please Note: This note may have been constructed using a voice recognition system  **

## 2023-05-27 NOTE — PHYSICAL THERAPY NOTE
Physical Therapy Progress Note     05/27/23 1100   PT Last Visit   PT Visit Date 05/27/23   Note Type   Note Type Treatment   Pain Assessment   Pain Assessment Tool 0-10   Pain Score 8   Pain Location/Orientation Orientation: Bilateral;Orientation: Lower; Location: Neck   Hospital Pain Intervention(s) Cold applied;Repositioned; Ambulation/increased activity; Emotional support   Restrictions/Precautions   Braces or Orthoses Other (Comment)  (Bilateral surgical shoes )   Other Precautions Spinal precautions;Pain; Fall Risk   Subjective   Subjective The patient is eager to demonstrate her mobility so that she can go home  After the session she notes that she feels confident returning home as she has good support, and she will be able to manage  She notes continued pain in her neck  Bed Mobility   Supine to Sit 4  Minimal assistance   Additional items Assist x 1;HOB elevated; Bedrails; Increased time required;Verbal cues;LE management   Transfers   Sit to Stand 5  Supervision   Additional items Increased time required   Stand to Sit 5  Supervision   Additional items Increased time required   Stand pivot 5  Supervision   Ambulation/Elevation   Gait pattern Excessively slow; Step to;Short stride; Inconsistent erickson   Gait Assistance 5  Supervision   Additional items Verbal cues   Assistive Device Rolling walker   Distance 110 feet, 50 feet  Stair Management Assistance 5  Supervision   Additional items Increased time required   Stair Management Technique Two rails; Step to pattern; Foreward   Number of Stairs 3   Balance   Static Sitting Good   Dynamic Sitting Fair +   Static Standing Fair +   Ambulatory Fair   Activity Tolerance   Activity Tolerance Patient tolerated treatment well;Patient limited by pain   Medical Staff Made Pro Chand PA-C  Nurse Made Adelso You RN  Assessment   Prognosis Good   Problem List Decreased endurance; Impaired balance;Decreased mobility;Obesity;Orthopedic restrictions;Pain;Decreased skin integrity   Assessment Today the patient is demonstrating much improvement in mobility as she required assistance only to get her legs off of the bed  She performed all transfers and gait without physical assistance  She was trialed on room air with SPO2 of 89-95% throughout the session  She noted no dizziness, lightheadedness, nor dyspnea at all  She demonstrated safe ambulation, and verbal instruction was provided for erickson and taking conservative rests  The patient was able to readily demonstrate the stairs, and these were limited to three as the patient needed to urgently utilize the bathroom  She has demonstrated the necessary mobility in order to return home safely, and the patient notes that she feels confident about returning home with continued home therapies  Barriers to Discharge None   Goals   Patient Goals To have less pain  STG Expiration Date 06/08/23   PT Treatment Day 1   Plan   Treatment/Interventions Functional transfer training;LE strengthening/ROM; Elevations; Therapeutic exercise; Endurance training;Patient/family training;Bed mobility;Gait training   Progress Progressing toward goals   PT Frequency 3-5x/wk   Recommendation   PT Discharge Recommendation Home with home health rehabilitation   Equipment Recommended [facility] Recommended Wheeled walker   AM-PAC Basic Mobility Inpatient   Turning in Flat Bed Without Bedrails 3   Lying on Back to Sitting on Edge of Flat Bed Without Bedrails 3   Moving Bed to Chair 3   Standing Up From Chair Using Arms 3   Walk in Room 3   Climb 3-5 Stairs With Railing 3   Basic Mobility Inpatient Raw Score 18   Basic Mobility Standardized Score 41 05   Highest Level Of Mobility   JH-HLM Goal 6: Walk 10 steps or more   JH-HLM Achieved 7: Walk 25 feet or more         An AM-PAC Basic Mobility raw score less than 16 suggests the patient may benefit from discharge to post-acute rehab services      Loulou Parks, PTA

## 2023-05-27 NOTE — ASSESSMENT & PLAN NOTE
Baseline appears around 1 0-1 2; upon discharge on 5/20 was 1 27  Presented with creatinine 1 62   She received contrast for PE study in ER  · Was initially placed on gentle IVF (75 mL/hr) upon admission  · Holding ACE-inhibitor, can likely resume at discharge   · Urinary retention protocol in place--required straight cath x1 on 5/25 PM however now improved   · Suspect could be partially related to her constipation   · Creat improving

## 2023-05-27 NOTE — PLAN OF CARE
Problem: Potential for Falls  Goal: Patient will remain free of falls  Description: INTERVENTIONS:  - Educate patient/family on patient safety including physical limitations  - Instruct patient to call for assistance with activity   - Consult OT/PT to assist with strengthening/mobility   - Keep Call bell within reach  - Keep bed low and locked with side rails adjusted as appropriate  - Keep care items and personal belongings within reach  - Initiate and maintain comfort rounds  - Make Fall Risk Sign visible to staff  - Offer Toileting every *2** Hours, in advance of need  - Initiate/Maintain *bed/chair**alarm  - Obtain necessary fall risk management equipment:   - Apply yellow socks and bracelet for high fall risk patients  - Consider moving patient to room near nurses station  Outcome: Progressing     Problem: NEUROSENSORY - ADULT  Goal: Achieves maximal functionality and self care  Description: INTERVENTIONS  - Monitor swallowing and airway patency with patient fatigue and changes in neurological status  - Encourage and assist patient to increase activity and self care     - Encourage visually impaired, hearing impaired and aphasic patients to use assistive/communication devices  Outcome: Progressing     Problem: CARDIOVASCULAR - ADULT  Goal: Absence of cardiac dysrhythmias or at baseline rhythm  Description: INTERVENTIONS:  - Continuous cardiac monitoring, vital signs, obtain 12 lead EKG if ordered  - Administer antiarrhythmic and heart rate control medications as ordered  - Monitor electrolytes and administer replacement therapy as ordered  Outcome: Progressing     Problem: METABOLIC, FLUID AND ELECTROLYTES - ADULT  Goal: Electrolytes maintained within normal limits  Description: INTERVENTIONS:  - Monitor labs and assess patient for signs and symptoms of electrolyte imbalances  - Administer electrolyte replacement as ordered  - Monitor response to electrolyte replacements, including repeat lab results as appropriate  - Instruct patient on fluid and nutrition as appropriate  Outcome: Progressing  Goal: Fluid balance maintained  Description: INTERVENTIONS:  - Monitor labs   - Monitor I/O and WT  - Instruct patient on fluid and nutrition as appropriate  - Assess for signs & symptoms of volume excess or deficit  Outcome: Progressing  Goal: Glucose maintained within target range  Description: INTERVENTIONS:  - Monitor Blood Glucose as ordered  - Assess for signs and symptoms of hyperglycemia and hypoglycemia  - Administer ordered medications to maintain glucose within target range  - Assess nutritional intake and initiate nutrition service referral as needed  Outcome: Progressing     Problem: HEMATOLOGIC - ADULT  Goal: Maintains hematologic stability  Description: INTERVENTIONS  - Assess for signs and symptoms of bleeding or hemorrhage  - Monitor labs  - Administer supportive blood products/factors as ordered and appropriate  Outcome: Progressing     Problem: MUSCULOSKELETAL - ADULT  Goal: Maintain or return mobility to safest level of function  Description: INTERVENTIONS:  - Assess patient's ability to carry out ADLs; assess patient's baseline for ADL function and identify physical deficits which impact ability to perform ADLs (bathing, care of mouth/teeth, toileting, grooming, dressing, etc )  - Assess/evaluate cause of self-care deficits   - Assess range of motion  - Assess patient's mobility  - Assess patient's need for assistive devices and provide as appropriate  - Encourage maximum independence but intervene and supervise when necessary  - Involve family in performance of ADLs  - Assess for home care needs following discharge   - Consider OT consult to assist with ADL evaluation and planning for discharge  - Provide patient education as appropriate  Outcome: Progressing  Goal: Maintain proper alignment of affected body part  Description: INTERVENTIONS:  - Support, maintain and protect limb and body alignment  - Provide patient/ family with appropriate education  Outcome: Progressing     Problem: SKIN/TISSUE INTEGRITY - ADULT  Goal: Skin Integrity remains intact(Skin Breakdown Prevention)  Description: Assess:  -Perform Miguel assessment every shift-Clean and moisturize skin every shift  -Inspect skin when repositioning, toileting, and assisting with ADLS  -Assess under medical devices-Assess extremities for adequate circulation and sensation     Bed Management:  -Have minimal linens on bed & keep smooth, unwrinkled  -Change linens as needed when moist or perspiring  -Avoid sitting or lying in one position for more than **2* hours while in bed  -Keep HOB at **30*degrees     Toileting:  -Offer bedside commode  -Assess for incontinence every shift  -Use incontinent care products after each incontinent episode    Activity:  -Mobilize patient *3** times a day  -Encourage activity and walks on unit  -Encourage or provide ROM exercises   -Turn and reposition patient every *2** Hours  -Use appropriate equipment to lift or move patient in bed  -Instruct/ Assist with weight shifting every **20 minutes* when out of bed in chair  -Consider limitation of chair time **2* hour intervals    Skin Care:  -Avoid use of baby powder, tape, friction and shearing, hot water or constrictive clothing  -Relieve pressure over bony prominences   -Do not massage red bony areas    Next Steps:  -Teach patient strategies to minimize risks   -Consider consults to  interdisciplinary teams   Outcome: Progressing  Goal: Incision(s), wounds(s) or drain site(s) healing without S/S of infection  Description: INTERVENTIONS  - Assess and document dressing, incision, wound bed, drain sites and surrounding tissue  - Provide patient and family education  - Perform skin care/dressing changes  Outcome: Progressing     Problem: PAIN - ADULT  Goal: Verbalizes/displays adequate comfort level or baseline comfort level  Description: Interventions:  - Encourage patient to monitor pain and request assistance  - Assess pain using appropriate pain scale  - Administer analgesics based on type and severity of pain and evaluate response  - Implement non-pharmacological measures as appropriate and evaluate response  - Consider cultural and social influences on pain and pain management  - Notify physician/advanced practitioner if interventions unsuccessful or patient reports new pain  Outcome: Progressing     Problem: INFECTION - ADULT  Goal: Absence or prevention of progression during hospitalization  Description: INTERVENTIONS:  - Assess and monitor for signs and symptoms of infection  - Monitor lab/diagnostic results  - Monitor all insertion sites, i e  indwelling lines, tubes, and drains  - Monitor endotracheal if appropriate and nasal secretions for changes in amount and color  - [place] appropriate cooling/warming therapies per order  - Administer medications as ordered  - Instruct and encourage patient and family to use good hand hygiene technique  - Identify and instruct in appropriate isolation precautions for identified infection/condition  Outcome: Progressing  Goal: Absence of fever/infection during neutropenic period  Description: INTERVENTIONS:  - Monitor WBC    Outcome: Progressing     Problem: SAFETY ADULT  Goal: Patient will remain free of falls  Description: INTERVENTIONS:  - Educate patient/family on patient safety including physical limitations  - Instruct patient to call for assistance with activity   - Consult OT/PT to assist with strengthening/mobility   - Keep Call bell within reach  - Keep bed low and locked with side rails adjusted as appropriate  - Keep care items and personal belongings within reach  - Initiate and maintain comfort rounds  - Make Fall Risk Sign visible to staff  - Offer Toileting every *2** Hours, in advance of need  - Initiate/Maintain **bed/chair*alarm  - Obtain necessary fall risk management equipment:  - Apply yellow socks and bracelet for high fall risk patients  - Consider moving patient to room near nurses station  Outcome: Progressing  Goal: Maintain or return to baseline ADL function  Description: INTERVENTIONS:  -  Assess patient's ability to carry out ADLs; assess patient's baseline for ADL function and identify physical deficits which impact ability to perform ADLs (bathing, care of mouth/teeth, toileting, grooming, dressing, etc )  - Assess/evaluate cause of self-care deficits   - Assess range of motion  - Assess patient's mobility; develop plan if impaired  - Assess patient's need for assistive devices and provide as appropriate  - Encourage maximum independence but intervene and supervise when necessary  - Involve family in performance of ADLs  - Assess for home care needs following discharge   - Consider OT consult to assist with ADL evaluation and planning for discharge  - Provide patient education as appropriate  Outcome: Progressing  Goal: Maintains/Returns to pre admission functional level  Description: INTERVENTIONS:  - Perform BMAT or MOVE assessment daily    - Set and communicate daily mobility goal to care team and patient/family/caregiver  - Collaborate with rehabilitation services on mobility goals if consulted  - Perform Range of Motion *2** times a day  - Reposition patient every *3** hours    - Dangle patient **3* times a day  - Stand patient **3* times a day  - Ambulate patient *3** times a day  - Out of bed to chair *3** times a day   - Out of bed for meals **3* times a day  - Out of bed for toileting  - Record patient progress and toleration of activity level   Outcome: Progressing     Problem: DISCHARGE PLANNING  Goal: Discharge to home or other facility with appropriate resources  Description: INTERVENTIONS:  - Identify barriers to discharge w/patient and caregiver  - Arrange for needed discharge resources and transportation as appropriate  - Identify discharge learning needs (meds, wound care, etc )  - Arrange for interpretive services to assist at discharge as needed  - Refer to Case Management Department for coordinating discharge planning if the patient needs post-hospital services based on physician/advanced practitioner order or complex needs related to functional status, cognitive ability, or social support system  Outcome: Progressing     Problem: Knowledge Deficit  Goal: Patient/family/caregiver demonstrates understanding of disease process, treatment plan, medications, and discharge instructions  Description: Complete learning assessment and assess knowledge base  Interventions:  - Provide teaching at level of understanding  - Provide teaching via preferred learning methods  Outcome: Progressing     Problem: MOBILITY - ADULT  Goal: Maintain or return to baseline ADL function  Description: INTERVENTIONS:  -  Assess patient's ability to carry out ADLs; assess patient's baseline for ADL function and identify physical deficits which impact ability to perform ADLs (bathing, care of mouth/teeth, toileting, grooming, dressing, etc )  - Assess/evaluate cause of self-care deficits   - Assess range of motion  - Assess patient's mobility; develop plan if impaired  - Assess patient's need for assistive devices and provide as appropriate  - Encourage maximum independence but intervene and supervise when necessary  - Involve family in performance of ADLs  - Assess for home care needs following discharge   - Consider OT consult to assist with ADL evaluation and planning for discharge  - Provide patient education as appropriate  Outcome: Progressing  Goal: Maintains/Returns to pre admission functional level  Description: INTERVENTIONS:  - Perform BMAT or MOVE assessment daily    - Set and communicate daily mobility goal to care team and patient/family/caregiver  - Collaborate with rehabilitation services on mobility goals if consulted  - Perform Range of Motion **3* times a day  - Reposition patient every *2** hours    - Dangle patient *3** times a day  - Stand patient *3** times a day  - Ambulate patient *3** times a day  - Out of bed to chair *3** times a day   - Out of bed for meals *3** times a day  - Out of bed for toileting  - Record patient progress and toleration of activity level   Outcome: Progressing     Problem: Prexisting or High Potential for Compromised Skin Integrity  Goal: Skin integrity is maintained or improved  Description: INTERVENTIONS:  - Identify patients at risk for skin breakdown  - Assess and monitor skin integrity  - Assess and monitor nutrition and hydration status  - Monitor labs   - Assess for incontinence   - Turn and reposition patient  - Assist with mobility/ambulation  - Relieve pressure over bony prominences  - Avoid friction and shearing  - Provide appropriate hygiene as needed including keeping skin clean and dry  - Evaluate need for skin moisturizer/barrier cream  - Collaborate with interdisciplinary team   - Patient/family teaching  - Consider wound care consult   Outcome: Progressing

## 2023-05-28 PROBLEM — R09.02 HYPOXIA: Status: ACTIVE | Noted: 2023-05-28

## 2023-05-28 PROBLEM — K59.00 CONSTIPATION: Status: RESOLVED | Noted: 2023-05-25 | Resolved: 2023-05-28

## 2023-05-28 PROBLEM — R23.8 BULLAE: Status: ACTIVE | Noted: 2023-05-28

## 2023-05-28 LAB
GLUCOSE SERPL-MCNC: 138 MG/DL (ref 65–140)
GLUCOSE SERPL-MCNC: 147 MG/DL (ref 65–140)
GLUCOSE SERPL-MCNC: 152 MG/DL (ref 65–140)
GLUCOSE SERPL-MCNC: 152 MG/DL (ref 65–140)

## 2023-05-28 PROCEDURE — 82948 REAGENT STRIP/BLOOD GLUCOSE: CPT

## 2023-05-28 PROCEDURE — 99232 SBSQ HOSP IP/OBS MODERATE 35: CPT | Performed by: INTERNAL MEDICINE

## 2023-05-28 PROCEDURE — NC001 PR NO CHARGE: Performed by: DERMATOLOGY

## 2023-05-28 RX ADMIN — HEPARIN SODIUM 7500 UNITS: 5000 INJECTION INTRAVENOUS; SUBCUTANEOUS at 14:23

## 2023-05-28 RX ADMIN — HEPARIN SODIUM 7500 UNITS: 5000 INJECTION INTRAVENOUS; SUBCUTANEOUS at 06:24

## 2023-05-28 RX ADMIN — ISOSORBIDE MONONITRATE 30 MG: 30 TABLET, EXTENDED RELEASE ORAL at 10:04

## 2023-05-28 RX ADMIN — PANTOPRAZOLE SODIUM 40 MG: 40 TABLET, DELAYED RELEASE ORAL at 06:24

## 2023-05-28 RX ADMIN — ACETAMINOPHEN 975 MG: 325 TABLET ORAL at 06:23

## 2023-05-28 RX ADMIN — METHOCARBAMOL TABLETS 500 MG: 500 TABLET, COATED ORAL at 06:24

## 2023-05-28 RX ADMIN — HEPARIN SODIUM 7500 UNITS: 5000 INJECTION INTRAVENOUS; SUBCUTANEOUS at 22:04

## 2023-05-28 RX ADMIN — METOPROLOL TARTRATE 25 MG: 25 TABLET, FILM COATED ORAL at 22:03

## 2023-05-28 RX ADMIN — ACETAMINOPHEN 975 MG: 325 TABLET ORAL at 14:23

## 2023-05-28 RX ADMIN — METOPROLOL TARTRATE 25 MG: 25 TABLET, FILM COATED ORAL at 10:05

## 2023-05-28 RX ADMIN — OXYBUTYNIN CHLORIDE 5 MG: 5 TABLET ORAL at 18:07

## 2023-05-28 RX ADMIN — ATORVASTATIN CALCIUM 40 MG: 40 TABLET, FILM COATED ORAL at 18:07

## 2023-05-28 RX ADMIN — METHOCARBAMOL TABLETS 500 MG: 500 TABLET, COATED ORAL at 18:07

## 2023-05-28 RX ADMIN — HYDROMORPHONE HYDROCHLORIDE 4 MG: 2 TABLET ORAL at 14:23

## 2023-05-28 RX ADMIN — GABAPENTIN 100 MG: 100 CAPSULE ORAL at 10:05

## 2023-05-28 RX ADMIN — HYDROMORPHONE HYDROCHLORIDE 4 MG: 2 TABLET ORAL at 10:03

## 2023-05-28 RX ADMIN — OXYBUTYNIN CHLORIDE 5 MG: 5 TABLET ORAL at 10:04

## 2023-05-28 RX ADMIN — METHOCARBAMOL TABLETS 500 MG: 500 TABLET, COATED ORAL at 12:44

## 2023-05-28 RX ADMIN — METHOCARBAMOL TABLETS 500 MG: 500 TABLET, COATED ORAL at 00:30

## 2023-05-28 RX ADMIN — INSULIN LISPRO 2 UNITS: 100 INJECTION, SOLUTION INTRAVENOUS; SUBCUTANEOUS at 12:47

## 2023-05-28 RX ADMIN — ACETAMINOPHEN 975 MG: 325 TABLET ORAL at 22:03

## 2023-05-28 RX ADMIN — GABAPENTIN 100 MG: 100 CAPSULE ORAL at 18:07

## 2023-05-28 RX ADMIN — DOCUSATE SODIUM 100 MG: 100 CAPSULE, LIQUID FILLED ORAL at 18:07

## 2023-05-28 RX ADMIN — GABAPENTIN 100 MG: 100 CAPSULE ORAL at 22:03

## 2023-05-28 RX ADMIN — LIDOCAINE 5% 1 PATCH: 700 PATCH TOPICAL at 10:05

## 2023-05-28 RX ADMIN — HYDROMORPHONE HYDROCHLORIDE 4 MG: 2 TABLET ORAL at 19:34

## 2023-05-28 RX ADMIN — ALLOPURINOL 200 MG: 100 TABLET ORAL at 18:07

## 2023-05-28 NOTE — ASSESSMENT & PLAN NOTE
Presents with increased LE edema with bullae bilateral lower extremities  Not on diuretics at baseline   Likely 2/2 recent surgical procedure and sitting with legs in dependent position for several days at home  · Echo without any significant findings, BNP normal  · LE venous duplex negative  · Elevate lower extremities  · Wound care consulted, continue local wound care  · Worsening as of 5/28--consult dermatology

## 2023-05-28 NOTE — ASSESSMENT & PLAN NOTE
Mild; has been intermittently requiring O2 during her stay  · Suspect OHS/PEGGY contributing + pain  · Continue IS/OOB/Pulmonary toilet  · Wean oxygen as able  · Would obtain formal home O2 evaluation prior to discharge if goes home

## 2023-05-28 NOTE — ASSESSMENT & PLAN NOTE
"No results found for: \"HGBA1C\"    Recent Labs     05/27/23  1128 05/27/23  1544 05/27/23  2042 05/28/23  0611   POCGLU 157* 125 147* 147*       Blood Sugar Average: Last 72 hrs:  · Pt reports A1c of 6 0 on April 5th 2023   Check in AM  · Despite well controlled DM, she reports chronic neuropathy   · Home meds held  (metformin 500 mg in the morning, 1000 mg in the evening, glimepiride 8 mg once a day,Pioglitazone hcl 30 mg daily)  · SSI/accuchecks  · Gabapentin initiated by [facility]   (P) [phone] [id number]    "

## 2023-05-28 NOTE — CONSULTS
DERMATOLOGY:  FINNSheila Del [age] y o  female MRN: [id number]  Unit/Bed#: [facility] 604-01 Encounter: [id number]      Consults      Assessment/Recommendations   Based on a thorough discussion of this condition and the management approach to it (including a comprehensive discussion of the known risks, side effects and potential benefits of treatment), the patient (family) agrees to implement the following specific plan:    1  Exam performed via pictures under Media tab  Tense bullae of the lower extremities without significant underlying erythema  Lower legs appear edematous  Differential diagnoses include edema bullae vs bullous diabeticorum  Bullous pemphigoid is less likely  2  Recommend conservative management with wound care  Tense bullae should be gently drained/popped and the overlying skin used as a natural dressing  Wound care can be performed in addition to this  3  Recommend addressing volume overload and lower extremity edema  Consider compression stockings when patient is able to tolerate application and recommend stockings at discharge  4  Recommend following up in clinic to assess for resolution; if no improvement may consider a biopsy at that time  Will defer biopsy for now given acute edema of the lower legs and increased risk of infection  5  I will help coordinate a follow up appointment in clinic  Please set up discharge follow up by calling our office: [phone] ([phone])     Thank you for involving me in the care of your patient  Please call with questions, change in clinical status or if tests recommended above are abnormal      Discussed with the primary service  History:     HISTORY OF PRESENT ILLNESS:    Reason for Consult: Blisters  HPI: Pedro Turner is a 78y o  year old female with a history of T2DM, CKD stage III, and recent surgical cervical decompression on 5/18 at [facility]  She was initially doing well after her surgical procedure anmd was discharged home   She was admitted on  with worsening cervical pain  She also reported blisters of the lower extremities that are described as painful in areas of erosion  No associated itching  Doppler U/S of both legs are negative, cardiac echo normal        ROS:  Limited by virtual consultation      PAST MEDICAL HISTORY:  Past Medical History:   Diagnosis Date   • Diabetes type 2    • MI (myocardial infarction) ([facility] [address] )    • Osteoarthritis      Past Surgical History:   Procedure Laterality Date   • BACK SURGERY     • CORONARY ANGIOPLASTY WITH STENT PLACEMENT N/A    • LAMINECTOMY     • MINIMALLY INVASIVE LUMBAR DECOMPRESSION         FAMILY HISTORY:  Non-contributory    SOCIAL HISTORY:  Social History   Single  Social History     Substance and Sexual Activity   Alcohol Use None     Social History     Substance and Sexual Activity   Drug Use Not on file     Social History     Tobacco Use   Smoking Status Never   Smokeless Tobacco Never       ALLERGIES:  Allergies   Allergen Reactions   • Latex Rash       MEDICATIONS:  All current active medications have been reviewed         Physical exam:     Temp:  [97 2 °F (36 2 °C)-98 6 °F (37 °C)] 98 6 °F (37 °C)  HR:  [80-95] 95  Resp:  [18] 18  BP: (141-157)/(71-86) 157/86  SpO2:  [88 %-91 %] 91 %  Temp (24hrs), Av 9 °F (36 6 °C), Min:97 2 °F (36 2 °C), Max:98 6 °F (37 °C)  Current: Temperature: 98 6 °F (37 °C)    Exam limited by virtual consultation    FULL ORGAN SYSTEM SKIN EXAM (SKIN)  Hair, Scalp, Ears, Face Not examined   Neck, Cervical Chain Nodes Not examined   Right Arm/Hand/Fingers Not examined   Left Arm/Hand/Fingers Not examined   Chest/Breasts/Axillae Not examined   Abdomen, Umbilicus Not examined   Back/Spine Not examined   Groin/Genitalia/Buttocks Not examined   Right Leg, Foot, Toes Tense bullae without significant underlying erythema   Left Leg, Foot, Toes Tense bullae without significant underlying erythema            Labs, Imaging, & Other Studies     Lab Results:  I have personally reviewed pertinent labs  Results from last 7 days   Lab Units 05/27/23  0551 05/26/23  0646 05/24/23  2247   HEMOGLOBIN g/dL 8 5* 8 4* 9 3*   PLATELETS Thousands/uL 216 223 256   WBC Thousand/uL 6 58 6 49 8 44     Results from last 7 days   Lab Units 05/27/23  0551 05/26/23  0646 05/25/23  1336 05/24/23  2247   ALK PHOS U/L  --  111  --  134*   ALT U/L  --  32  --  41   AST U/L  --  56*  --  82*   BUN mg/dL 28* 30*   < > 39*   CALCIUM mg/dL 9 1 9 2   < > 9 3   CHLORIDE mmol/L 110* 109*   < > 104   CO2 mmol/L 26 26   < > 24   CREATININE mg/dL 1 17 1 22   < > 1 62*   EGFR ml/min/1 73sq m 48 46   < > 32   POTASSIUM mmol/L 4 1 4 0   < > 3 9    < > = values in this interval not displayed  Pathology:   I have personally reviewed pertinent reports

## 2023-05-28 NOTE — PLAN OF CARE
Problem: PAIN - ADULT  Goal: Verbalizes/displays adequate comfort level or baseline comfort level  Description: Interventions:  - Encourage patient to monitor pain and request assistance  - Assess pain using appropriate pain scale  - Administer analgesics based on type and severity of pain and evaluate response  - Implement non-pharmacological measures as appropriate and evaluate response  - Consider cultural and social influences on pain and pain management  - Notify physician/advanced practitioner if interventions unsuccessful or patient reports new pain  Outcome: Progressing     Problem: INFECTION - ADULT  Goal: Absence or prevention of progression during hospitalization  Description: INTERVENTIONS:  - Assess and monitor for signs and symptoms of infection  - Monitor lab/diagnostic results  - Monitor all insertion sites, i e  indwelling lines, tubes, and drains  - Monitor endotracheal if appropriate and nasal secretions for changes in amount and color  - [place] appropriate cooling/warming therapies per order  - Administer medications as ordered  - Instruct and encourage patient and family to use good hand hygiene technique  - Identify and instruct in appropriate isolation precautions for identified infection/condition  Outcome: Progressing  Goal: Absence of fever/infection during neutropenic period  Description: INTERVENTIONS:  - Monitor WBC    Outcome: Progressing

## 2023-05-28 NOTE — ASSESSMENT & PLAN NOTE
Baseline appears around 1 0-1 2; upon discharge on 5/20 was 1 27  Presented with creatinine 1 62   She received contrast for PE study in ER  · Was initially placed on gentle IVF (75 mL/hr) upon admission--now off   · Holding ACE-inhibitor, can likely resume at discharge   · Urinary retention protocol in place--required straight cath x1 on 5/25 PM however now improved and voiding  · Creat now at baseline  · BMP in AM

## 2023-05-28 NOTE — ASSESSMENT & PLAN NOTE
Patient reports MI history, in 2010 with 1 stent placement  · Continue statin, BB, imdur  · Currently holding aspirin 81 mg daily, status post surgery--resume when cleared by her primary surgeon

## 2023-05-28 NOTE — PROGRESS NOTES
[address] [facility]  Progress Note  Name: Lea Rebolledo I  MRN: [id number]  Unit/Bed#: [facility] 485-52 I Date of Admission: 5/24/2023   Date of Service: 5/28/2023 I Hospital Day: 3    Assessment/Plan   * Cervical pain  Assessment & Plan  Presents with cervical pain and b/l LE weakness s/p PCDF C3-T1 on 5/18/23 at [facility] with Dr Tamy Madera (discharged 5/20/23)  No dedicated cervical imaging obtained upon admission  · Neurosurgery consulted, input appreciated  · Ordered upright cervical xrays--per Nsx acceptable alignment and hardware placement   · C-collar in place; per surgeon should be worn at all times during first 2 weeks, after 2 weeks may remove at sleep only for the next 4 weeks  · After third day postop, patient should shower daily and cover incision daily for the first 14 days with a clean dressing  · APS consulted, input appreciated  · Continue Tylenol 975 mg p o  every 8 hours  · Added gabapentin 100 mg p o  3 times daily, added lidocaine patch, added Robaxin 500 mg p o  every 6 hours scheduled  · On admission her p o  oxy was changed to p o  Dilaudid, continue for now  · Bowel regimen in place  · Continue SQ Heparin  · PT/OT recommended rehab initially, however as of 5/27 had been doing better and cleared for home with home therapy  Unfortunately has had worsening pain and again on 5/28--instructed patient to try to stay on top of pain with oral medications so that it does not become so severe         Lower extremity edema with bullae  Assessment & Plan  Presents with increased LE edema with bullae bilateral lower extremities  Not on diuretics at baseline   Likely 2/2 recent surgical procedure and sitting with legs in dependent position for several days at home  · Echo without any significant findings, BNP normal  · LE venous duplex negative  · Elevate lower extremities  · Wound care consulted, continue local wound care  · Worsening as of 5/28--consult "dermatology            Hypoxia  Assessment & Plan  Mild; has been intermittently requiring O2 during her stay  · Suspect OHS/PEGGY contributing + pain  · Continue IS/OOB/Pulmonary toilet  · Wean oxygen as able  · Would obtain formal home O2 evaluation prior to discharge if goes home    Anemia  Assessment & Plan  Hgb 9 3 on admission, potentially related to recent surgery  · monitor CBC, slight decrease, probably related to IVF     Elevated d-dimer  Assessment & Plan  CTA PE study negative and main pulmonary artery, distal order branches were not evaluated due to respiratory motion artifact  · Lower extremity duplex negiatve    Acid reflux  Assessment & Plan  · Continue pantoprazole 40 mg daily    Coronary artery disease without angina pectoris  Assessment & Plan  Patient reports MI history, in 2010 with 1 stent placement  · Continue statin, BB, imdur  · Currently holding aspirin 81 mg daily, status post surgery--resume when cleared by her primary surgeon    Type 2 diabetes mellitus with renal complication [facility])  Assessment & Plan  No results found for: \"HGBA1C\"    Recent Labs     05/27/23  1128 05/27/23  1544 05/27/23  2042 05/28/23  0611   POCGLU 157* 125 147* 147*       Blood Sugar Average: Last 72 hrs:  · Pt reports A1c of 6 0 on April 5th 2023  Check in AM  · Despite well controlled DM, she reports chronic neuropathy   · Home meds held  (metformin 500 mg in the morning, 1000 mg in the evening, glimepiride 8 mg once a day,Pioglitazone hcl 30 mg daily)  · SSI/accuchecks  · Gabapentin initiated by APS   (P) [phone]      Chronic kidney disease stage III with PHYLLIS  Assessment & Plan  Baseline appears around 1 0-1 2; upon discharge on 5/20 was 1 27  Presented with creatinine 1 62   She received contrast for PE study in ER  · Was initially placed on gentle IVF (75 mL/hr) upon admission--now off   · Holding ACE-inhibitor, can likely resume at discharge   · Urinary retention protocol in place--required straight " cath x1 on 5/25 PM however now improved and voiding  · Creat now at baseline  · BMP in AM    Hyponatremia-resolved as of 5/26/2023  Assessment & Plan  Mild on admission, ?dehdyration given elevated BUN/Cr as well  · Now resolved     Tachycardia-resolved as of 5/26/2023  Assessment & Plan  Sinus on admission EKG, suspect secondary to pain  · CTA negative for PE however distal branches not evaluated due to respiratory motion artifact   · Venous duplex negative  · Consideration for telemetry and further w/u if persists  · Control pain as above     Constipation-resolved as of 5/28/2023  Assessment & Plan  2/2 surgical procedure, narcotics, immobility  · Bowel regimen ordered  · Now resolved              VTE Pharmacologic Prophylaxis: VTE Score: 7 Moderate Risk (Score 3-4) - Pharmacological DVT Prophylaxis Ordered: heparin  Patient Centered Rounds: I performed bedside rounds with nursing staff today  Discussions with Specialists or Other Care Team Provider: d/w CM  Education and Discussions with Family / Patient: Patient declined call to   Total Time Spent on Date of Encounter in care of patient: 35 minutes This time was spent on one or more of the following: performing physical exam; counseling and coordination of care; obtaining or reviewing history; documenting in the medical record; reviewing/ordering tests, medications or procedures; communicating with other healthcare professionals and discussing with patient's family/caregivers      Current Length of Stay: 3 day(s)  Current Patient Status: Inpatient   Certification Statement: The patient will continue to require additional inpatient hospital stay due to worsening pain, worsening lower extremity bullae   Discharge Plan: Anticipate discharge in 48-72 hrs to home vs rehab (will depend how she is doing closer to [place])    Code Status: Level 1 - Full Code    Subjective:   Pt feels a lot worse this AM  She has not taken pain medications very much over last 24 hours and feels it has caught up with her  Encouraged her to stay on top of pain so that it does not become so severe  Voiding/having BMs  Denies SOB but was placed on O2 again overnight  She got up to commode this AM and felt very unsafe due to the amount of pain  She is concerned about the blisters on her b/l LE which appear increasingly red  Objective:     Vitals:   Temp (24hrs), Av 9 °F (36 6 °C), Min:97 2 °F (36 2 °C), Max:98 6 °F (37 °C)    Temp:  [97 2 °F (36 2 °C)-98 6 °F (37 °C)] 98 6 °F (37 °C)  HR:  [80-95] 95  Resp:  [18] 18  BP: (141-157)/(71-86) 157/86  SpO2:  [88 %-91 %] 91 %  Body mass index is 46 47 kg/m²  Input and Output Summary (last 24 hours): Intake/Output Summary (Last 24 hours) at 2023 1039  Last data filed at 2023 0853  Gross per 24 hour   Intake 240 ml   Output --   Net 240 ml       Physical Exam:   Physical Exam  Vitals and nursing note reviewed  Constitutional:       General: She is in acute distress (secondary to pain )  Appearance: She is obese  She is not ill-appearing  Comments: On RA at time of my eval satting 93%   Neck:      Comments: In cervical collar  Cardiovascular:      Rate and Rhythm: Normal rate and regular rhythm  Pulmonary:      Effort: No respiratory distress  Breath sounds: No wheezing  Abdominal:      General: There is no distension  Tenderness: There is no abdominal tenderness  Musculoskeletal:      Right lower leg: Edema present  Left lower leg: Edema present  Skin:     Coloration: Skin is pale  Comments: See clinical media tab; several large fluid filled bullae over bilateral shins/feet  Some erythema surrounding   Neurological:      Mental Status: She is oriented to person, place, and time     Psychiatric:         Mood and Affect: Mood normal           Additional Data:     Labs:  Results from last 7 days   Lab Units 23  0551 23  0646   BANDS PCT % 2  --    EOS PCT % 3 4 HEMATOCRIT % 25 9* 25 3*   HEMOGLOBIN g/dL 8 5* 8 4*   LYMPHS PCT %  --  19   LYMPHO PCT % 15  --    MONOS PCT %  --  9   MONO PCT % 2*  --    NEUTROS PCT %  --  66   PLATELETS Thousands/uL 216 223   WBC Thousand/uL 6 58 6 49     Results from last 7 days   Lab Units 05/27/23  0551 05/26/23  0646   ANION GAP mmol/L 2* 1*   ALBUMIN g/dL  --  2 4*   ALK PHOS U/L  --  111   ALT U/L  --  32   AST U/L  --  56*   BUN mg/dL 28* 30*   CALCIUM mg/dL 9 1 9 2   CHLORIDE mmol/L 110* 109*   CO2 mmol/L 26 26   CREATININE mg/dL 1 17 1 22   GLUCOSE RANDOM mg/dL 140 129   POTASSIUM mmol/L 4 1 4 0   SODIUM mmol/L 138 136   TOTAL BILIRUBIN mg/dL  --  0 43         Results from last 7 days   Lab Units 05/28/23  0611 05/27/23  2042 05/27/23  1544 05/27/23  1128 05/27/23  0901 05/26/23  2041 05/26/23  1655 05/26/23  1146 05/26/23  0714 05/25/23  2055 05/25/23  1601 05/25/23  1117   POC GLUCOSE mg/dl 147* 147* 125 157* 175* 174* 143* 130 130 145* 149* 138               Lines/Drains:  Invasive Devices     Peripheral Intravenous Line  Duration           Peripheral IV 05/24/23 Right Antecubital 3 days          Drain  Duration           External Urinary Catheter 2 days                      Imaging: No pertinent imaging reviewed      Recent Cultures (last 7 days):         Last 24 Hours Medication List:   Current Facility-Administered Medications   Medication Dose Route Frequency Provider Last Rate   • acetaminophen  975 mg Oral [facility] ROSE MARY Nunez     • allopurinol  200 mg Oral Daily ROSE MARY Nunez     • atorvastatin  40 mg Oral Daily ROSE MARY Nunez     • docusate sodium  100 mg Oral BID ROSE MARY Nunez     • gabapentin  100 mg Oral TID Remberto Tapia PA-C     • heparin (porcine)  7,500 Units Subcutaneous [facility] Shea Shepherd PA-C     • HYDROmorphone  2 mg Oral Q4H PRN ROSE MARY Nunez     • HYDROmorphone  4 mg Oral Q4H PRN ROSE MARY Nunez     • insulin lispro  2-12 Units Subcutaneous TID [facility] Matt Grey ROSE MARY Jaquez     • insulin lispro  2-12 Units Subcutaneous HS ROSE MARY Nunez     • isosorbide mononitrate  30 mg Oral Daily ROSE MARY Nunez     • lidocaine  1 patch Topical Daily Remberto Tapia PA-C     • methocarbamol  500 mg Oral Q6H [facility] Remberto Tapia PA-C     • metoprolol tartrate  25 mg Oral Q12H [facility] ROSE MARY Nunez     • oxybutynin  5 mg Oral BID ROSE MARY Nunez     • pantoprazole  40 mg Oral Daily ROSE MARY Nunez     • polyethylene glycol  17 g Oral Daily ROSE MARY Nunez          Today, Patient Was Seen By: Shea Shepherd PA-C    **Please Note: This note may have been constructed using a voice recognition system  **

## 2023-05-29 LAB
ANION GAP SERPL CALCULATED.3IONS-SCNC: 0 MMOL/L (ref 4–13)
BASOPHILS # BLD AUTO: 0.06 THOUSANDS/ÂΜL (ref 0–0.1)
BASOPHILS NFR BLD AUTO: 1 % (ref 0–1)
BUN SERPL-MCNC: 21 MG/DL (ref 5–25)
CALCIUM SERPL-MCNC: 9.2 MG/DL (ref 8.3–10.1)
CHLORIDE SERPL-SCNC: 109 MMOL/L (ref 96–108)
CO2 SERPL-SCNC: 27 MMOL/L (ref 21–32)
CREAT SERPL-MCNC: 1.25 MG/DL (ref 0.6–1.3)
EOSINOPHIL # BLD AUTO: 0.27 THOUSAND/ÂΜL (ref 0–0.61)
EOSINOPHIL NFR BLD AUTO: 3 % (ref 0–6)
ERYTHROCYTE [DISTWIDTH] IN BLOOD BY AUTOMATED COUNT: 15.4 % (ref 11.6–15.1)
EST. AVERAGE GLUCOSE BLD GHB EST-MCNC: 140 MG/DL
GFR SERPL CREATININE-BSD FRML MDRD: 44 ML/MIN/1.73SQ M
GLUCOSE SERPL-MCNC: 117 MG/DL (ref 65–140)
GLUCOSE SERPL-MCNC: 141 MG/DL (ref 65–140)
GLUCOSE SERPL-MCNC: 147 MG/DL (ref 65–140)
GLUCOSE SERPL-MCNC: 155 MG/DL (ref 65–140)
GLUCOSE SERPL-MCNC: 160 MG/DL (ref 65–140)
HBA1C MFR BLD: 6.5 %
HCT VFR BLD AUTO: 26.7 % (ref 34.8–46.1)
HGB BLD-MCNC: 8.7 G/DL (ref 11.5–15.4)
IMM GRANULOCYTES # BLD AUTO: 0.21 THOUSAND/UL (ref 0–0.2)
IMM GRANULOCYTES NFR BLD AUTO: 2 % (ref 0–2)
LYMPHOCYTES # BLD AUTO: 2.15 THOUSANDS/ÂΜL (ref 0.6–4.47)
LYMPHOCYTES NFR BLD AUTO: 24 % (ref 14–44)
MCH RBC QN AUTO: 29.5 PG (ref 26.8–34.3)
MCHC RBC AUTO-ENTMCNC: 32.6 G/DL (ref 31.4–37.4)
MCV RBC AUTO: 91 FL (ref 82–98)
MONOCYTES # BLD AUTO: 0.62 THOUSAND/ÂΜL (ref 0.17–1.22)
MONOCYTES NFR BLD AUTO: 7 % (ref 4–12)
NEUTROPHILS # BLD AUTO: 5.61 THOUSANDS/ÂΜL (ref 1.85–7.62)
NEUTS SEG NFR BLD AUTO: 63 % (ref 43–75)
NRBC BLD AUTO-RTO: 0 /100 WBCS
PLATELET # BLD AUTO: 210 THOUSANDS/UL (ref 149–390)
PMV BLD AUTO: 10.2 FL (ref 8.9–12.7)
POTASSIUM SERPL-SCNC: 3.8 MMOL/L (ref 3.5–5.3)
RBC # BLD AUTO: 2.95 MILLION/UL (ref 3.81–5.12)
SODIUM SERPL-SCNC: 136 MMOL/L (ref 135–147)
WBC # BLD AUTO: 8.92 THOUSAND/UL (ref 4.31–10.16)

## 2023-05-29 PROCEDURE — 82948 REAGENT STRIP/BLOOD GLUCOSE: CPT

## 2023-05-29 PROCEDURE — 85025 COMPLETE CBC W/AUTO DIFF WBC: CPT | Performed by: INTERNAL MEDICINE

## 2023-05-29 PROCEDURE — 83036 HEMOGLOBIN GLYCOSYLATED A1C: CPT | Performed by: INTERNAL MEDICINE

## 2023-05-29 PROCEDURE — 99232 SBSQ HOSP IP/OBS MODERATE 35: CPT | Performed by: NURSE PRACTITIONER

## 2023-05-29 PROCEDURE — 80048 BASIC METABOLIC PNL TOTAL CA: CPT | Performed by: INTERNAL MEDICINE

## 2023-05-29 PROCEDURE — 99222 1ST HOSP IP/OBS MODERATE 55: CPT | Performed by: PODIATRIST

## 2023-05-29 RX ORDER — ALBUMIN (HUMAN) 12.5 G/50ML
12.5 SOLUTION INTRAVENOUS ONCE
Status: COMPLETED | OUTPATIENT
Start: 2023-05-29 | End: 2023-05-29

## 2023-05-29 RX ADMIN — GABAPENTIN 100 MG: 100 CAPSULE ORAL at 16:59

## 2023-05-29 RX ADMIN — ALBUMIN HUMAN 12.5 G: 0.25 SOLUTION INTRAVENOUS at 10:42

## 2023-05-29 RX ADMIN — HEPARIN SODIUM 7500 UNITS: 5000 INJECTION INTRAVENOUS; SUBCUTANEOUS at 14:15

## 2023-05-29 RX ADMIN — METHOCARBAMOL TABLETS 500 MG: 500 TABLET, COATED ORAL at 17:00

## 2023-05-29 RX ADMIN — METOPROLOL TARTRATE 25 MG: 25 TABLET, FILM COATED ORAL at 21:18

## 2023-05-29 RX ADMIN — OXYBUTYNIN CHLORIDE 5 MG: 5 TABLET ORAL at 17:00

## 2023-05-29 RX ADMIN — HYDROMORPHONE HYDROCHLORIDE 4 MG: 2 TABLET ORAL at 05:06

## 2023-05-29 RX ADMIN — INSULIN LISPRO 2 UNITS: 100 INJECTION, SOLUTION INTRAVENOUS; SUBCUTANEOUS at 09:28

## 2023-05-29 RX ADMIN — OXYBUTYNIN CHLORIDE 5 MG: 5 TABLET ORAL at 09:25

## 2023-05-29 RX ADMIN — ACETAMINOPHEN 975 MG: 325 TABLET ORAL at 05:06

## 2023-05-29 RX ADMIN — ACETAMINOPHEN 975 MG: 325 TABLET ORAL at 21:18

## 2023-05-29 RX ADMIN — ALLOPURINOL 200 MG: 100 TABLET ORAL at 16:59

## 2023-05-29 RX ADMIN — ISOSORBIDE MONONITRATE 30 MG: 30 TABLET, EXTENDED RELEASE ORAL at 09:25

## 2023-05-29 RX ADMIN — METOPROLOL TARTRATE 25 MG: 25 TABLET, FILM COATED ORAL at 09:25

## 2023-05-29 RX ADMIN — HYDROMORPHONE HYDROCHLORIDE 2 MG: 2 TABLET ORAL at 09:25

## 2023-05-29 RX ADMIN — GABAPENTIN 100 MG: 100 CAPSULE ORAL at 09:26

## 2023-05-29 RX ADMIN — GABAPENTIN 100 MG: 100 CAPSULE ORAL at 21:18

## 2023-05-29 RX ADMIN — METHOCARBAMOL TABLETS 500 MG: 500 TABLET, COATED ORAL at 11:55

## 2023-05-29 RX ADMIN — METHOCARBAMOL TABLETS 500 MG: 500 TABLET, COATED ORAL at 05:06

## 2023-05-29 RX ADMIN — ACETAMINOPHEN 975 MG: 325 TABLET ORAL at 14:14

## 2023-05-29 RX ADMIN — HYDROMORPHONE HYDROCHLORIDE 4 MG: 2 TABLET ORAL at 14:14

## 2023-05-29 RX ADMIN — PANTOPRAZOLE SODIUM 40 MG: 40 TABLET, DELAYED RELEASE ORAL at 05:06

## 2023-05-29 RX ADMIN — HYDROMORPHONE HYDROCHLORIDE 2 MG: 2 TABLET ORAL at 23:25

## 2023-05-29 RX ADMIN — INSULIN LISPRO 2 UNITS: 100 INJECTION, SOLUTION INTRAVENOUS; SUBCUTANEOUS at 11:56

## 2023-05-29 RX ADMIN — HEPARIN SODIUM 7500 UNITS: 5000 INJECTION INTRAVENOUS; SUBCUTANEOUS at 21:18

## 2023-05-29 RX ADMIN — LIDOCAINE 5% 1 PATCH: 700 PATCH TOPICAL at 09:27

## 2023-05-29 RX ADMIN — METHOCARBAMOL TABLETS 500 MG: 500 TABLET, COATED ORAL at 23:23

## 2023-05-29 RX ADMIN — ATORVASTATIN CALCIUM 40 MG: 40 TABLET, FILM COATED ORAL at 16:59

## 2023-05-29 RX ADMIN — HEPARIN SODIUM 7500 UNITS: 5000 INJECTION INTRAVENOUS; SUBCUTANEOUS at 05:05

## 2023-05-29 NOTE — PROGRESS NOTES
[facility]  Progress Note  Name: Masha Roberto I  MRN: [id number]  [facility] Date of Admission: 5/24/2023   Date of Service: 5/29/2023 I Hospital Day: 4    Assessment/Plan   * Cervical pain  Assessment & Plan  Presents with cervical pain and b/l LE weakness s/p PCDF C3-T1 on 5/18/23 at [facility] with Dr Amber Madera (discharged 5/20/23)  No dedicated cervical imaging obtained upon admission  · Neurosurgery consulted, input appreciated  · Ordered upright cervical xrays--per Nsx acceptable alignment and hardware placement   · C-collar in place; per surgeon should be worn at all times during first 2 weeks, starting 6/1 ( 2 weeks post op) may remove at sleep only for the next 4 weeks  · Can shower daily and cover incision daily for the first 14 days post op with a clean dressing  · APS consulted, input appreciated  · Continue Tylenol 975 mg p o  every 8 hours  · Added gabapentin 100 mg p o  3 times daily, added lidocaine patch, added Robaxin 500 mg p o  every 6 hours scheduled  · On admission her p o  oxy was changed to p o  Dilaudid, continue for now  · Bowel regimen in place  · Continue SQ Heparin  · PT/OT recommended rehab initially however seems to be doing better with pain control, at this point plan for home with [facility] [address]  Lower extremity edema with bullae  Assessment & Plan  Presents with increased LE edema with bullae bilateral lower extremities  Not on diuretics at baseline  Likely 2/2 recent surgical procedure and sitting with legs in dependent position for several days at home  Also with albumin 2 4  · Echo without any significant findings, BNP normal  · LE venous duplex negative  · Elevate lower extremities  · Wound care consulted, continue local wound care  · Dermatology consult appreciated, will need outpatient f/u to include possible biopsy     · Will consult podiatry             Hypoxia  Assessment & Plan  Mild; has been intermittently requiring O2 during her stay, mainly HS  · Suspect OHS/PEGGY contributing + pain  · Continue IS/OOB/Pulmonary toilet  · Wean oxygen as able  · Consider formal O2 Evaluation prior to discharge     Anemia  Assessment & Plan  Overall stable, slight decrease today  · No s/s active bleeding  · Monitor     Acid reflux  Assessment & Plan  · Continue pantoprazole 40 mg daily    Chronic kidney disease stage III with PHYLLIS  Assessment & Plan  Baseline appears around 1 0-1 2; upon discharge on 5/20 was 1 27  Presented with creatinine 1 62   She received contrast for PE study in ER  · Was initially placed on gentle IVF (75 mL/hr) upon admission--now off   · Holding ACE-inhibitor, can likely resume at discharge   · Urinary retention protocol in place--required straight cath x1 on 5/25 PM however now improved and voiding  · Creat now at baseline  · BMP in AM    Coronary artery disease without angina pectoris  Assessment & Plan  Patient reports MI history, in 2010 with 1 stent placement  · Continue statin, BB, imdur  · Currently holding aspirin 81 mg daily, status post surgery--resume when cleared by her primary surgeon    Elevated d-dimer  Assessment & Plan  CTA PE study negative and main pulmonary artery, distal order branches were not evaluated due to respiratory motion artifact  · Lower extremity duplex negiatve    Type 2 diabetes mellitus with renal complication [facility])  Assessment & Plan  Lab Results   Component Value Date    HGBA1C 6 5 (H) 05/29/2023       Recent Labs     05/28/23  1134 05/28/23  1601 05/28/23  2048 05/29/23  0618   POCGLU 152* 138 152* 155*       Blood Sugar Average: Last 72 hrs:  · Despite well controlled DM, she reports chronic neuropathy   · Home meds held  (metformin 500 mg in the morning, 1000 mg in the evening, glimepiride 8 mg once a day,Pioglitazone hcl 30 mg daily)  · SSI/accuchecks  · Gabapentin initiated by APS            VTE Pharmacologic Prophylaxis: VTE Score: 7 Moderate Risk (Score 3-4) - Pharmacological DVT Prophylaxis Ordered: heparin  Patient Centered Rounds: I performed bedside rounds with nursing staff today  Discussions with Specialists or Other Care Team Provider: nursing    Education and Discussions with Family / Patient: Patient declined call to   Total Time Spent on Date of Encounter in care of patient: 35 minutes This time was spent on one or more of the following: performing physical exam; counseling and coordination of care; obtaining or reviewing history; documenting in the medical record; reviewing/ordering tests, medications or procedures; communicating with other healthcare professionals and discussing with patient's family/caregivers  Current Length of Stay: 4 day(s)  Current Patient Status: Inpatient   Certification Statement: The patient will continue to require additional inpatient hospital stay due to monitor legs, pain control, PT/OT re-evaluations to determine appropriate dispo  Discharge Plan: Anticipate discharge in 48-72 hrs to rehab versus home    Code Status: Level 1 - Full Code    Subjective:   Patient reports that neck pain is controlled  Had a bowel movement this morning  Feels as though she is doing better with physical therapy but is limited secondary to leg pain given swelling and blisters  Dermatology recommendations reviewed, will consult podiatry for drainage of bullae  Objective:     Vitals:   Temp (24hrs), Av 3 °F (36 8 °C), Min:97 7 °F (36 5 °C), Max:98 8 °F (37 1 °C)    Temp:  [97 7 °F (36 5 °C)-98 8 °F (37 1 °C)] 97 7 °F (36 5 °C)  HR:  [] 81  Resp:  [18] 18  BP: (122-146)/(63-73) 123/63  SpO2:  [87 %-93 %] 87 %  Body mass index is 46 47 kg/m²  Input and Output Summary (last 24 hours): Intake/Output Summary (Last 24 hours) at 2023 0848  Last data filed at 2023 0500  Gross per 24 hour   Intake 240 ml   Output 750 ml   Net -510 ml       Physical Exam:   Physical Exam  Vitals and nursing note reviewed  Constitutional:       General: She is not in acute distress  Appearance: She is obese  Comments: On RA   Neck:      Comments: Cervical collar in place   Cardiovascular:      Rate and Rhythm: Normal rate  Pulmonary:      Effort: No respiratory distress  Breath sounds: Normal breath sounds  Abdominal:      Tenderness: There is no abdominal tenderness  Musculoskeletal:         General: Swelling (BLLE with large bullae noted) present  Skin:     General: Skin is warm  Coloration: Skin is pale  Neurological:      Mental Status: She is alert and oriented to person, place, and time  Mental status is at baseline  Psychiatric:         Mood and Affect: Mood normal           Additional Data:     Labs:  Results from last 7 days   Lab Units 05/29/23  0451 05/27/23  0551   BANDS PCT %  --  2   EOS PCT % 3 3   HEMATOCRIT % 26 7* 25 9*   HEMOGLOBIN g/dL 8 7* 8 5*   LYMPHS PCT % 24  --    LYMPHO PCT %  --  15   MONOS PCT % 7  --    MONO PCT %  --  2*   NEUTROS PCT % 63  --    PLATELETS Thousands/uL 210 216   WBC Thousand/uL 8 92 6 58     Results from last 7 days   Lab Units 05/29/23 0451 05/27/23  0551 05/26/23  0646   ANION GAP mmol/L 0*   < > 1*   ALBUMIN g/dL  --   --  2 4*   ALK PHOS U/L  --   --  111   ALT U/L  --   --  32   AST U/L  --   --  56*   BUN mg/dL 21   < > 30*   CALCIUM mg/dL 9 2   < > 9 2   CHLORIDE mmol/L 109*   < > 109*   CO2 mmol/L 27   < > 26   CREATININE mg/dL 1 25   < > 1 22   GLUCOSE RANDOM mg/dL 147*   < > 129   POTASSIUM mmol/L 3 8   < > 4 0   SODIUM mmol/L 136   < > 136   TOTAL BILIRUBIN mg/dL  --   --  0 43    < > = values in this interval not displayed           Results from last 7 days   Lab Units 05/29/23  0618 05/28/23  2048 05/28/23  1601 05/28/23  1134 05/28/23  0611 05/27/23  2042 05/27/23  1544 05/27/23  1128 05/27/23  0901 05/26/23  2041 05/26/23  1655 05/26/23  1146   POC GLUCOSE mg/dl 155* 152* 138 152* 147* 147* 125 157* 175* 174* 143* 130     Results from last 7 days   Lab Units 05/29/23  0451   HEMOGLOBIN A1C % 6 5*           Lines/Drains:  Invasive Devices     Peripheral Intravenous Line  Duration           Peripheral IV 05/29/23 Proximal;Right;Ventral (anterior) Forearm <1 day          Drain  Duration           External Urinary Catheter 3 days                      Imaging: No pertinent imaging reviewed  Recent Cultures (last 7 days):         Last 24 Hours Medication List:   Current Facility-Administered Medications   Medication Dose Route Frequency Provider Last Rate   • acetaminophen  975 mg Oral [facility] ROSE MARY Oates     • allopurinol  200 mg Oral Daily ROSE MARY Oates     • atorvastatin  40 mg Oral Daily PALAK OatesNP     • docusate sodium  100 mg Oral BID PALAK OatesNP     • gabapentin  100 mg Oral TID Randi Melgar PA-C     • heparin (porcine)  7,500 Units Subcutaneous [facility] Terra Hicks PA-C     • HYDROmorphone  2 mg Oral Q4H PRN ROSE MARY Oates     • HYDROmorphone  4 mg Oral Q4H PRN ROSE MARY Oates     • insulin lispro  2-12 Units Subcutaneous TID [facility] ROSE MARY Oates     • insulin lispro  2-12 Units Subcutaneous HS ROSE MARY Oates     • isosorbide mononitrate  30 mg Oral Daily ROSE MARY Oates     • lidocaine  1 patch Topical Daily Randi Melgar PA-C     • methocarbamol  500 mg Oral Q6H [facility] Randi Melgar PA-C     • metoprolol tartrate  25 mg Oral Q12H [facility] ROSE MARY Vela     • oxybutynin  5 mg Oral BID ROSE MARY Oates     • pantoprazole  40 mg Oral Daily ROSE MARY Oates     • polyethylene glycol  17 g Oral Daily ROSE MARY Oates          Today, Patient Was Seen By: ROSE MARY Caraballo    **Please Note: This note may have been constructed using a voice recognition system  **

## 2023-05-29 NOTE — CASE MANAGEMENT
Case Management Discharge Planning Note    Patient name Noam Heart  [facility] [address] [facility] [id number]  : 1955 Date 2023       Current Admission Date: 2023  Current Admission Diagnosis:Cervical pain   Patient Active Problem List    Diagnosis Date Noted   • Hypoxia 2023   • Cervical pain 2023   • Chronic kidney disease stage III with PHYLLIS 2023   • Type 2 diabetes mellitus with renal complication ([facility] [address] )    • Coronary artery disease without angina pectoris 2023   • Acid reflux 2023   • Lower extremity edema with bullae 2023   • Elevated d-dimer 2023   • Anemia 2023      LOS (days): 4  Geometric Mean LOS (GMLOS) (days): 2 80  Days to GMLOS:-1 6     OBJECTIVE:  Risk of Unplanned Readmission Score: 11 84         Current admission status: Inpatient   Preferred Pharmacy:   [id number] [address]  Phone: [phone] Fax: [phone]    Primary Care Provider: Olvin Oh MD    Primary Insurance: MEDICARE  Secondary Insurance: Carlo Ramey    DISCHARGE DETAILS:    [address]         Is the patient interested in [facility] at discharge?: Yes  [address] requested[de-identified] Physical Therapy, Occupational [address] Name[de-identified]  [facility] Provider[de-identified] PCP  Home Health Services Needed[de-identified] Evaluate Functional Status and Safety  Homebound Criteria Met[de-identified] Requires the Assistance of Another Person for Safe Ambulation or to Leave the Home  Supporting Clincal Findings[de-identified] Fatigues Easliy in Short Distances  Treatment Team Recommendation: Home with  [employer]  Discharge Destination Plan[de-identified] Home with  [employer]

## 2023-05-29 NOTE — ASSESSMENT & PLAN NOTE
Lab Results   Component Value Date    HGBA1C 6 5 (H) 05/29/2023       Recent Labs     05/28/23  1134 05/28/23  1601 05/28/23 2048 05/29/23  0618   POCGLU 152* 138 152* 155*       Blood Sugar Average: Last 72 hrs:  · Despite well controlled DM, she reports chronic neuropathy   · Home meds held  (metformin 500 mg in the morning, 1000 mg in the evening, glimepiride 8 mg once a day,Pioglitazone hcl 30 mg daily)  · SSI/accuchecks  · Gabapentin initiated by APS

## 2023-05-29 NOTE — ASSESSMENT & PLAN NOTE
Presents with increased LE edema with bullae bilateral lower extremities  Not on diuretics at baseline  Likely 2/2 recent surgical procedure and sitting with legs in dependent position for several days at home  Also with albumin 2 4  · Echo without any significant findings, BNP normal  · LE venous duplex negative  · Elevate lower extremities  · Wound care consulted, continue local wound care  · Dermatology consult appreciated, will need outpatient f/u to include possible biopsy     · Will consult podiatry

## 2023-05-29 NOTE — CONSULTS
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  3  Chronic kidney disease with overlying PHYLLIS  4  Lymphedema    PLAN:    · Plan for local wound care consisting of Maxorb, ABD, 4 x 4 and Kerlix to bilateral lower extremities  After most of the bulla have deflated would recommend application of Ace wraps from the toes to below the tibial tuberosities  If bulla become painful would recommend lancing with sterile needle, please contact podiatry if necessary  · Most likely etiology of wounds is venous stasis given her self-reported history of positive venous reflux studies, CKD stage III,  and sitting in chair for 5 days in a dependent position  Patient to follow-up for potential biopsy with dermatology in the outpatient setting to rule out bullous pemphigoid, although unlikely  · Prevalon boots for offloading   · Elevation and offloading on green foam wedges or pillows when non-ambulatory  · Rest of care per primary team   · Will discuss this plan with my attending and update as needed  Weightbearing status: Weightbearing as tolerated    SUBJECTIVE:    History of Present Illness:    Ochoa Gonzales is a 78 y o  female who is originally admitted 5/24/2023 due to cervical pain  Patient has a past medical history of surgery for surgical issues, chronic kidney disease stage III with PHYLLIS, type 2 diabetes mellitus with renal complications, lower extremity edema, venous stasis and anemia       We are consulted for bilateral lower extremity venous stasis with bulla    Patient reports that approximately 6 days ago she underwent surgery and cervical test   After surgery patient was sent home she was recovering from the surgery  Patient reports that due to pain she was only able to sit in a chair with her legs in a dependent position for 5 days  Patient reports that she did not move much in her legs in that position  Patient reports that approximately 2 days ago she noticed bulla her legs and she presented back to the emergency room for this issue  Patient reports that she has a history of venous stasis with positive venous reflux studies  Patient reports that she takes Lasix at home however due to her inability to move she was not taking it as prescribed  Patient denies nausea vomiting short of breath or chest pain  Review of Systems:    Constitutional: Negative  HENT: Negative  Eyes: Negative  Respiratory: Negative  Cardiovascular: Negative  Gastrointestinal: Negative  Musculoskeletal: negative    Skin:bulla on lower extremity    Neurological: neuropathy   Psych: Negative       Past Medical and Surgical History:     Past Medical History:   Diagnosis Date   • Diabetes type 2    • MI (myocardial infarction) ([facility] [address] )    • Osteoarthritis        Past Surgical History:   Procedure Laterality Date   • BACK SURGERY     • CORONARY ANGIOPLASTY WITH STENT PLACEMENT N/A    • LAMINECTOMY     • MINIMALLY INVASIVE LUMBAR DECOMPRESSION         Meds/Allergies:    Medications Prior to Admission   Medication   • allopurinol (ZYLOPRIM) 100 mg tablet   • aspirin 81 mg chewable tablet   • atorvastatin (LIPITOR) 40 mg tablet   • baclofen 10 mg tablet   • glimepiride (AMARYL) 4 mg tablet   • isosorbide mononitrate (IMDUR) 30 mg 24 hr tablet   • lisinopril (ZESTRIL) 2 5 mg tablet   • metFORMIN (GLUCOPHAGE) 1000 MG tablet   • metFORMIN (GLUCOPHAGE) 500 mg tablet   • metoprolol tartrate (LOPRESSOR) 25 mg tablet   • oxybutynin (DITROPAN) 5 mg tablet   • pantoprazole (PROTONIX) 40 mg tablet   • pioglitazone (ACTOS) 30 mg tablet       Allergies   Allergen Reactions   • Latex Rash       Social "History:     Marital Status: Single    Substance Use History:   Social History     Substance and Sexual Activity   Alcohol Use None     Social History     Tobacco Use   Smoking Status Never   Smokeless Tobacco Never     Social History     Substance and Sexual Activity   Drug Use Not on file       Family History:    Family History   Problem Relation Age of Onset   • Diabetes Mother    • Heart attack Father    • Hodgkin's lymphoma Father         non hodgkins lymphoma   • Other Father         mesothelioma         OBJECTIVE:    Vitals:   Blood Pressure: 123/63 (05/29/23 0621)  Pulse: 81 (05/29/23 0621)  Temperature: 97 7 °F (36 5 °C) (05/29/23 0621)  Temp Source: Oral (05/28/23 2114)  Respirations: 18 (05/29/23 0621)  Height: 5' 4\" (162 6 cm) (05/26/23 0900)  Weight - Scale: 120 kg (265 lb 6 9 oz) (05/29/23 0911)  SpO2: 94 % (05/29/23 0911)    Physical Exam:    General Appearance: Alert, cooperative, no distress  HEENT: Head normocephalic, atraumatic, without obvious abnormality  Heart: Normal rate and rhythm  Lungs: Non-labored breathing  No respiratory distress  Abdomen: Without distension  Psychiatric: AAOx3  Lower Extremity:    Vascular:   DP: Right: doppler signal Left: doppler signal  PT: Right: doppler signal Left: doppler signal  CRT < 3 seconds at the digits  +0/4 edema noted at bilateral lower extremities  Pedal hair is absent  Skin temperature is WNL bilaterally  Musculoskeletal:  MMT is 4/5 in all muscle compartments bilaterally  ROM at the 1st MPJ and ankle joint are reduced bilaterally with the leg extended  Pain on palpation of bilateral legs   No gross deformities noted  Dermatological:  Multiple bulla noted along the bilateral lower extremities  The majority of his bulla appears serous in nature  Several of the bulla have popped and the wounds underneath are granular in appearance  There is no clinical signs of infection noted on the bilateral lower extremities   No fluctuance or " "crepitus noted                             Neurological:  Gross sensation is diminished  Light touch is diminished  Protective sensation is diminished  Additional data:     Lab Results: I have personally reviewed pertinent labs including:    Results from last 7 days   Lab Units 05/29/23  0451   EOS PCT % 3   HEMATOCRIT % 26 7*   HEMOGLOBIN g/dL 8 7*   LYMPHS PCT % 24   MONOS PCT % 7   NEUTROS PCT % 63   PLATELETS Thousands/uL 210   WBC Thousand/uL 8 92     Results from last 7 days   Lab Units 05/29/23  0451 05/27/23  0551 05/26/23  0646   ALK PHOS U/L  --   --  111   ALT U/L  --   --  32   AST U/L  --   --  56*   BUN mg/dL 21   < > 30*   CALCIUM mg/dL 9 2   < > 9 2   CHLORIDE mmol/L 109*   < > 109*   CO2 mmol/L 27   < > 26   CREATININE mg/dL 1 25   < > 1 22   POTASSIUM mmol/L 3 8   < > 4 0    < > = values in this interval not displayed  Cultures: I have personally reviewed pertinent cultures including:              Imaging: I have personally reviewed pertinent reports in PACS  EKG, Pathology, and Other Studies: I have personally reviewed pertinent reports  Time Spent for Care: 30 minutes  More than 50% of total time spent on counseling and coordination of care as described above  ** Please Note: Portions of the record may have been created with voice recognition software  Occasional wrong word or \"sound a like\" substitutions may have occurred due to the inherent limitations of voice recognition software  Read the chart carefully and recognize, using context, where substitutions have occurred   **    "

## 2023-05-29 NOTE — ASSESSMENT & PLAN NOTE
Mild; has been intermittently requiring O2 during her stay, mainly HS     · Suspect OHS/PEGGY contributing + pain  · Continue IS/OOB/Pulmonary toilet  · Currently on RA  · Consider formal O2 Evaluation prior to discharge

## 2023-05-29 NOTE — PLAN OF CARE
Problem: Potential for Falls  Goal: Patient will remain free of falls  Description: INTERVENTIONS:  - Educate patient/family on patient safety including physical limitations  - Instruct patient to call for assistance with activity   - Consult OT/PT to assist with strengthening/mobility   - Keep Call bell within reach  - Keep bed low and locked with side rails adjusted as appropriate  - Keep care items and personal belongings within reach  - Initiate and maintain comfort rounds  - Make Fall Risk Sign visible to staff  - Offer Toileting every *2** Hours, in advance of need  - Initiate/Maintain **bed/chair *alarm  - Obtain necessary fall risk management equipment:  - Apply yellow socks and bracelet for high fall risk patients  - Consider moving patient to room near nurses station  Outcome: Progressing     Problem: NEUROSENSORY - ADULT  Goal: Achieves maximal functionality and self care  Description: INTERVENTIONS  - Monitor swallowing and airway patency with patient fatigue and changes in neurological status  - Encourage and assist patient to increase activity and self care     - Encourage visually impaired, hearing impaired and aphasic patients to use assistive/communication devices  Outcome: Progressing     Problem: CARDIOVASCULAR - ADULT  Goal: Absence of cardiac dysrhythmias or at baseline rhythm  Description: INTERVENTIONS:  - Continuous cardiac monitoring, vital signs, obtain 12 lead EKG if ordered  - Administer antiarrhythmic and heart rate control medications as ordered  - Monitor electrolytes and administer replacement therapy as ordered  Outcome: Progressing     Problem: METABOLIC, FLUID AND ELECTROLYTES - ADULT  Goal: Electrolytes maintained within normal limits  Description: INTERVENTIONS:  - Monitor labs and assess patient for signs and symptoms of electrolyte imbalances  - Administer electrolyte replacement as ordered  - Monitor response to electrolyte replacements, including repeat lab results as appropriate  - Instruct patient on fluid and nutrition as appropriate  Outcome: Progressing  Goal: Fluid balance maintained  Description: INTERVENTIONS:  - Monitor labs   - Monitor I/O and WT  - Instruct patient on fluid and nutrition as appropriate  - Assess for signs & symptoms of volume excess or deficit  Outcome: Progressing  Goal: Glucose maintained within target range  Description: INTERVENTIONS:  - Monitor Blood Glucose as ordered  - Assess for signs and symptoms of hyperglycemia and hypoglycemia  - Administer ordered medications to maintain glucose within target range  - Assess nutritional intake and initiate nutrition service referral as needed  Outcome: Progressing     Problem: HEMATOLOGIC - ADULT  Goal: Maintains hematologic stability  Description: INTERVENTIONS  - Assess for signs and symptoms of bleeding or hemorrhage  - Monitor labs  - Administer supportive blood products/factors as ordered and appropriate  Outcome: Progressing     Problem: MUSCULOSKELETAL - ADULT  Goal: Maintain or return mobility to safest level of function  Description: INTERVENTIONS:  - Assess patient's ability to carry out ADLs; assess patient's baseline for ADL function and identify physical deficits which impact ability to perform ADLs (bathing, care of mouth/teeth, toileting, grooming, dressing, etc )  - Assess/evaluate cause of self-care deficits   - Assess range of motion  - Assess patient's mobility  - Assess patient's need for assistive devices and provide as appropriate  - Encourage maximum independence but intervene and supervise when necessary  - Involve family in performance of ADLs  - Assess for home care needs following discharge   - Consider OT consult to assist with ADL evaluation and planning for discharge  - Provide patient education as appropriate  Outcome: Progressing  Goal: Maintain proper alignment of affected body part  Description: INTERVENTIONS:  - Support, maintain and protect limb and body alignment  - Provide patient/ family with appropriate education  Outcome: Progressing     Problem: SKIN/TISSUE INTEGRITY - ADULT  Goal: Skin Integrity remains intact(Skin Breakdown Prevention)  Description: Assess  -Clean and moisturize skin   -Inspect skin when repositioning, toileting, and assisting with ADLS  -Assess under medical devices   -Assess extremities for adequate circulation and sensation     Bed Management:  -Have minimal linens on bed & keep smooth, unwrinkled  -Change linens as needed when moist or perspiring  -Avoid sitting or lying in one position for more than *2** hours while in bed  -Keep HOB at *30**degrees     Toileting:  -Offer bedside commode  -Assess for incontinence every shift -Use incontinent care products after each incontinent episode     Activity:  -Mobilize patient *3** times a day  -Encourage activity and walks on unit  -Encourage or provide ROM exercises   -Turn and reposition patient every **2* Hours  -Use appropriate equipment to lift or move patient in bed  -Instruct/ Assist with weight shifting every 20 minutes when out of bed in chair  -Consider limitation of chair time **2* hour intervals    Skin Care:  -Avoid use of baby powder, tape, friction and shearing, hot water or constrictive clothing  -Relieve pressure over bony prominences   -Do not massage red bony areas    Next Steps:  -Teach patient strategies to minimize   Outcome: Progressing  Goal: Incision(s), wounds(s) or drain site(s) healing without S/S of infection  Description: INTERVENTIONS  - Assess and document dressing, incision, wound bed, drain sites and surrounding tissue  - Provide patient and family education  - Perform skin care/dressing changes every shift  Outcome: Progressing     Problem: PAIN - ADULT  Goal: Verbalizes/displays adequate comfort level or baseline comfort level  Description: Interventions:  - Encourage patient to monitor pain and request assistance  - Assess pain using appropriate pain scale  - Administer analgesics based on type and severity of pain and evaluate response  - Implement non-pharmacological measures as appropriate and evaluate response  - Consider cultural and social influences on pain and pain management  - Notify physician/advanced practitioner if interventions unsuccessful or patient reports new pain  Outcome: Progressing     Problem: INFECTION - ADULT  Goal: Absence or prevention of progression during hospitalization  Description: INTERVENTIONS:  - Assess and monitor for signs and symptoms of infection  - Monitor lab/diagnostic results  - Monitor all insertion sites, i e  indwelling lines, tubes, and drains  - Monitor endotracheal if appropriate and nasal secretions for changes in amount and color  - [place] appropriate cooling/warming therapies per order  - Administer medications as ordered  - Instruct and encourage patient and family to use good hand hygiene technique  - Identify and instruct in appropriate isolation precautions for identified infection/condition  Outcome: Progressing  Goal: Absence of fever/infection during neutropenic period  Description: INTERVENTIONS:  - Monitor WBC    Outcome: Progressing     Problem: SAFETY ADULT  Goal: Patient will remain free of falls  Description: INTERVENTIONS:  - Educate patient/family on patient safety including physical limitations  - Instruct patient to call for assistance with activity   - Consult OT/PT to assist with strengthening/mobility   - Keep Call bell within reach  - Keep bed low and locked with side rails adjusted as appropriate  - Keep care items and personal belongings within reach  - Initiate and maintain comfort rounds  - Make Fall Risk Sign visible to staff  - Offer Toileting every *2** Hours, in advance of need  - Initiate/Maintain *bed/chair **alarm  - Obtain necessary fall risk management equipment:   - Apply yellow socks and bracelet for high fall risk patients  - Consider moving patient to room near nurses station  Outcome: Progressing  Goal: Maintain or return to baseline ADL function  Description: INTERVENTIONS:  -  Assess patient's ability to carry out ADLs; assess patient's baseline for ADL function and identify physical deficits which impact ability to perform ADLs (bathing, care of mouth/teeth, toileting, grooming, dressing, etc )  - Assess/evaluate cause of self-care deficits   - Assess range of motion  - Assess patient's mobility; develop plan if impaired  - Assess patient's need for assistive devices and provide as appropriate  - Encourage maximum independence but intervene and supervise when necessary  - Involve family in performance of ADLs  - Assess for home care needs following discharge   - Consider OT consult to assist with ADL evaluation and planning for discharge  - Provide patient education as appropriate  Outcome: Progressing  Goal: Maintains/Returns to pre admission functional level  Description: INTERVENTIONS:  - Perform BMAT or MOVE assessment daily    - Set and communicate daily mobility goal to care team and patient/family/caregiver  - Collaborate with rehabilitation services on mobility goals if consulted  - Perform Range of Motion **3* times a day  - Reposition patient every *2** hours    - Dangle patient **3* times a day  - Stand patient **3* times a day  - Ambulate patient *3** times a day  - Out of bed to chair **3* times a day   - Out of bed for meals **3* times a day  - Out of bed for toileting  - Record patient progress and toleration of activity level   Outcome: Progressing     Problem: DISCHARGE PLANNING  Goal: Discharge to home or other facility with appropriate resources  Description: INTERVENTIONS:  - Identify barriers to discharge w/patient and caregiver  - Arrange for needed discharge resources and transportation as appropriate  - Identify discharge learning needs (meds, wound care, etc )  - Arrange for interpretive services to assist at discharge as needed  - Refer to Case Management Department for coordinating discharge planning if the patient needs post-hospital services based on physician/advanced practitioner order or complex needs related to functional status, cognitive ability, or social support system  Outcome: Progressing     Problem: Knowledge Deficit  Goal: Patient/family/caregiver demonstrates understanding of disease process, treatment plan, medications, and discharge instructions  Description: Complete learning assessment and assess knowledge base  Interventions:  - Provide teaching at level of understanding  - Provide teaching via preferred learning methods  Outcome: Progressing     Problem: MOBILITY - ADULT  Goal: Maintain or return to baseline ADL function  Description: INTERVENTIONS:  -  Assess patient's ability to carry out ADLs; assess patient's baseline for ADL function and identify physical deficits which impact ability to perform ADLs (bathing, care of mouth/teeth, toileting, grooming, dressing, etc )  - Assess/evaluate cause of self-care deficits   - Assess range of motion  - Assess patient's mobility; develop plan if impaired  - Assess patient's need for assistive devices and provide as appropriate  - Encourage maximum independence but intervene and supervise when necessary  - Involve family in performance of ADLs  - Assess for home care needs following discharge   - Consider OT consult to assist with ADL evaluation and planning for discharge  - Provide patient education as appropriate  Outcome: Progressing  Goal: Maintains/Returns to pre admission functional level  Description: INTERVENTIONS:  - Perform BMAT or MOVE assessment daily    - Set and communicate daily mobility goal to care team and patient/family/caregiver  - Collaborate with rehabilitation services on mobility goals if consulted  - Perform Range of Motion **3* times a day  - Reposition patient every *2** hours    - Dangle patient **3* times a day  - Stand patient **3* times a day  - Ambulate patient **3* times a day  - Out of bed to chair **3* times a day   - Out of bed for meals **3* times a day  - Out of bed for toileting  - Record patient progress and toleration of activity level   Outcome: Progressing     Problem: Prexisting or High Potential for Compromised Skin Integrity  Goal: Skin integrity is maintained or improved  Description: INTERVENTIONS:  - Identify patients at risk for skin breakdown  - Assess and monitor skin integrity  - Assess and monitor nutrition and hydration status  - Monitor labs   - Assess for incontinence   - Turn and reposition patient  - Assist with mobility/ambulation  - Relieve pressure over bony prominences  - Avoid friction and shearing  - Provide appropriate hygiene as needed including keeping skin clean and dry  - Evaluate need for skin moisturizer/barrier cream  - Collaborate with interdisciplinary team   - Patient/family teaching  - Consider wound care consult   Outcome: Progressing

## 2023-05-29 NOTE — ASSESSMENT & PLAN NOTE
Presents with cervical pain and b/l LE weakness s/p PCDF C3-T1 on 5/18/23 at [facility] with Dr Juan Pablo Madera (discharged 5/20/23)  No dedicated cervical imaging obtained upon admission  · Neurosurgery consulted, input appreciated  · Ordered upright cervical xrays--per Nsx acceptable alignment and hardware placement   · C-collar in place; per surgeon should be worn at all times during first 2 weeks, starting 6/1 ( 2 weeks post op) may remove at sleep only for the next 4 weeks  · Can shower daily and cover incision daily for the first 14 days post op with a clean dressing  · APS consulted, input appreciated  · Continue Tylenol 975 mg p o  every 8 hours  · Added gabapentin 100 mg p o  3 times daily, added lidocaine patch, added Robaxin 500 mg p o  every 6 hours scheduled  · On admission her p o  oxy was changed to p o  Dilaudid, continue for now  · Bowel regimen in place  · Continue SQ Heparin  · PT/OT recommended rehab initially however seems to be doing better with pain control, at this point plan for home with [facility]

## 2023-05-30 LAB
ANION GAP SERPL CALCULATED.3IONS-SCNC: 0 MMOL/L (ref 4–13)
BUN SERPL-MCNC: 20 MG/DL (ref 5–25)
CALCIUM SERPL-MCNC: 9.2 MG/DL (ref 8.3–10.1)
CHLORIDE SERPL-SCNC: 109 MMOL/L (ref 96–108)
CO2 SERPL-SCNC: 28 MMOL/L (ref 21–32)
CREAT SERPL-MCNC: 1.15 MG/DL (ref 0.6–1.3)
GFR SERPL CREATININE-BSD FRML MDRD: 49 ML/MIN/1.73SQ M
GLUCOSE SERPL-MCNC: 142 MG/DL (ref 65–140)
GLUCOSE SERPL-MCNC: 143 MG/DL (ref 65–140)
GLUCOSE SERPL-MCNC: 143 MG/DL (ref 65–140)
GLUCOSE SERPL-MCNC: 148 MG/DL (ref 65–140)
GLUCOSE SERPL-MCNC: 149 MG/DL (ref 65–140)
POTASSIUM SERPL-SCNC: 3.7 MMOL/L (ref 3.5–5.3)
SODIUM SERPL-SCNC: 137 MMOL/L (ref 135–147)

## 2023-05-30 PROCEDURE — 82948 REAGENT STRIP/BLOOD GLUCOSE: CPT

## 2023-05-30 PROCEDURE — 80048 BASIC METABOLIC PNL TOTAL CA: CPT | Performed by: NURSE PRACTITIONER

## 2023-05-30 PROCEDURE — 99232 SBSQ HOSP IP/OBS MODERATE 35: CPT | Performed by: NURSE PRACTITIONER

## 2023-05-30 PROCEDURE — 99232 SBSQ HOSP IP/OBS MODERATE 35: CPT | Performed by: ANESTHESIOLOGY

## 2023-05-30 PROCEDURE — 85027 COMPLETE CBC AUTOMATED: CPT | Performed by: NURSE PRACTITIONER

## 2023-05-30 PROCEDURE — 85007 BL SMEAR W/DIFF WBC COUNT: CPT | Performed by: NURSE PRACTITIONER

## 2023-05-30 RX ORDER — ALBUMIN (HUMAN) 12.5 G/50ML
12.5 SOLUTION INTRAVENOUS ONCE
Status: COMPLETED | OUTPATIENT
Start: 2023-05-30 | End: 2023-05-31

## 2023-05-30 RX ORDER — GABAPENTIN 100 MG/1
200 CAPSULE ORAL 2 TIMES DAILY
Status: DISCONTINUED | OUTPATIENT
Start: 2023-05-30 | End: 2023-05-31 | Stop reason: HOSPADM

## 2023-05-30 RX ADMIN — ACETAMINOPHEN 975 MG: 325 TABLET ORAL at 21:06

## 2023-05-30 RX ADMIN — ACETAMINOPHEN 975 MG: 325 TABLET ORAL at 05:22

## 2023-05-30 RX ADMIN — ISOSORBIDE MONONITRATE 30 MG: 30 TABLET, EXTENDED RELEASE ORAL at 09:08

## 2023-05-30 RX ADMIN — HYDROMORPHONE HYDROCHLORIDE 4 MG: 2 TABLET ORAL at 17:43

## 2023-05-30 RX ADMIN — LIDOCAINE 5% 1 PATCH: 700 PATCH TOPICAL at 09:09

## 2023-05-30 RX ADMIN — METHOCARBAMOL TABLETS 500 MG: 500 TABLET, COATED ORAL at 05:22

## 2023-05-30 RX ADMIN — HYDROMORPHONE HYDROCHLORIDE 2 MG: 2 TABLET ORAL at 05:35

## 2023-05-30 RX ADMIN — ACETAMINOPHEN 975 MG: 325 TABLET ORAL at 13:27

## 2023-05-30 RX ADMIN — ATORVASTATIN CALCIUM 40 MG: 40 TABLET, FILM COATED ORAL at 17:10

## 2023-05-30 RX ADMIN — PANTOPRAZOLE SODIUM 40 MG: 40 TABLET, DELAYED RELEASE ORAL at 05:22

## 2023-05-30 RX ADMIN — METOPROLOL TARTRATE 25 MG: 25 TABLET, FILM COATED ORAL at 09:09

## 2023-05-30 RX ADMIN — METHOCARBAMOL TABLETS 500 MG: 500 TABLET, COATED ORAL at 17:10

## 2023-05-30 RX ADMIN — METOPROLOL TARTRATE 25 MG: 25 TABLET, FILM COATED ORAL at 21:06

## 2023-05-30 RX ADMIN — HYDROMORPHONE HYDROCHLORIDE 4 MG: 2 TABLET ORAL at 09:37

## 2023-05-30 RX ADMIN — HEPARIN SODIUM 7500 UNITS: 5000 INJECTION INTRAVENOUS; SUBCUTANEOUS at 05:22

## 2023-05-30 RX ADMIN — HYDROMORPHONE HYDROCHLORIDE 4 MG: 2 TABLET ORAL at 22:46

## 2023-05-30 RX ADMIN — ALBUMIN (HUMAN) 12.5 G: 0.25 INJECTION, SOLUTION INTRAVENOUS at 13:27

## 2023-05-30 RX ADMIN — METHOCARBAMOL TABLETS 500 MG: 500 TABLET, COATED ORAL at 13:27

## 2023-05-30 RX ADMIN — OXYBUTYNIN CHLORIDE 5 MG: 5 TABLET ORAL at 17:10

## 2023-05-30 RX ADMIN — HYDROMORPHONE HYDROCHLORIDE 4 MG: 2 TABLET ORAL at 13:37

## 2023-05-30 RX ADMIN — GABAPENTIN 100 MG: 100 CAPSULE ORAL at 09:08

## 2023-05-30 RX ADMIN — GABAPENTIN 200 MG: 100 CAPSULE ORAL at 17:10

## 2023-05-30 RX ADMIN — OXYBUTYNIN CHLORIDE 5 MG: 5 TABLET ORAL at 09:08

## 2023-05-30 RX ADMIN — METHOCARBAMOL TABLETS 500 MG: 500 TABLET, COATED ORAL at 23:15

## 2023-05-30 RX ADMIN — HEPARIN SODIUM 7500 UNITS: 5000 INJECTION INTRAVENOUS; SUBCUTANEOUS at 21:07

## 2023-05-30 RX ADMIN — ALLOPURINOL 200 MG: 100 TABLET ORAL at 17:10

## 2023-05-30 RX ADMIN — HEPARIN SODIUM 7500 UNITS: 5000 INJECTION INTRAVENOUS; SUBCUTANEOUS at 13:27

## 2023-05-30 NOTE — ASSESSMENT & PLAN NOTE
Presents with cervical pain and b/l LE weakness s/p PCDF C3-T1 on 5/18/23 at [facility] with Dr Jonathan Madera (discharged 5/20/23)  No dedicated cervical imaging obtained upon admission  · Neurosurgery consulted, input appreciated  · Ordered upright cervical xrays--per Nsx acceptable alignment and hardware placement   · C-collar in place; per surgeon should be worn at all times during first 2 weeks, starting 6/1 ( 2 weeks post op) may remove at sleep only for the next 4 weeks  · Can shower daily and cover incision daily for the first 14 days post op with a clean dressing  · APS consulted, input appreciated  · Continue Tylenol 975 mg p o  every 8 hours  · Added gabapentin 100 mg p o  3 times daily, added lidocaine patch, added Robaxin 500 mg p o  every 6 hours scheduled  · On admission her p o  oxy was changed to p o  Dilaudid, continue for now  Plan to continue same on discharge  · Bowel regimen in place  · Continue SQ Heparin  · PT/OT recommended rehab initially however seems to be doing better with pain control, at this point plan for home with [facility]

## 2023-05-30 NOTE — RESTORATIVE TECHNICIAN NOTE
Restorative Technician Note      Patient Name: Nuha Arriaza     [facility] Tech Visit Date: 05/30/23  Note Type: Mobility  Patient Position Upon Consult: Supine  Activity Performed: Ambulated  Assistive Device: Roller walker  Patient Position at End of Consult: Bedside chair;  All needs within reach    Jase Zapata Restorative Technician

## 2023-05-30 NOTE — PROGRESS NOTES
Progress Note - Acute Pain Service    [facility] 78 y o  female MRN: [id number]  Unit/Bed#: [facility] 604-01 Encounter: [id number]      Assessment:   Principal Problem:    Cervical pain  Active Problems:    Chronic kidney disease stage III with PHYLLIS    Type 2 diabetes mellitus with renal complication (HCC)    Coronary artery disease without angina pectoris    Acid reflux    Lower extremity edema with bullae    Elevated d-dimer    Anemia    Hypoxia    [facility] is a 78 y o  female recently underwent PCD F at [facility] in [place] on 5/18/2023  She was discharged home on 5/20/2023  Shortly after arriving home developed rapidly worsening posterior cervical pain as well as midline upper back pain  She denies any trauma or other inciting events and states she has worn her collar at all times as instructed  Also with complaints of right greater than left lower extremity weakness and edema on admission  Upon admission she had repeat upright cervical spine x-rays which revealed acceptable alignment and hardware placement per neurosurgery  Acute pain service was consulted for assistance in postoperative pain management  Patient seen at bedside this morning reports overall improvement of posterior cervical spine and upper back pain  She does continue to report burning lower extremity pain which is exacerbated with ambulation  She is planned to be discharged home with home health care services in the next 24 hours  She is cleared for discharge from a pain management standpoint  Plan:   · Continue Tylenol 975 mg every 8 hours scheduled  · Increase gabapentin to 200 mg twice daily  · Estimated Creatinine Clearance: 61 4 mL/min (by C-G formula based on SCr of 1 15 mg/dL)    · Continue lidocaine patch, 12 hours on/12 hours off, to affected areas  · Continue Robaxin 500 mg every 6 hours scheduled  · Continue oral Dilaudid 2 mg every 4 hours as needed, for moderate pain  · Continue oral Dilaudid 4 mg every 4 hours as needed, for severe pain    At time of discharge recommend Multimodal analgesia with the following:  · Tylenol 975 mg every 8 hours as needed, for mild pain  · Gabapentin 200 mg twice daily  · Lidocaine patch, 12 hours on/12 hours off, to affected area  · Robaxin 500 mg every 6 hours scheduled x5 days, then as needed for muscle spasm  · Oral Dilaudid 2 mg every 4 hours as needed, for moderate/severe pain x3-4 days, then discontinue  · Patient is not to take newly prescribed oral Dilaudid with previously prescribed hydrocodone/acetaminophen or oxycodone    Bowel Regimen:  · Colace 100 mg 2 times daily  · MiraLAX daily    Treatment plan and recommendations discussed with primary care service  APS will sign off at this time  Thank you for the consult  All opioids and other analgesics to be written at discretion of primary team  Please contact Acute Pain Service - [facility] via [place] from [phone] with additional questions or concerns  See Hillary or Dimitri for additional contacts and after hours information  Pain History  Current pain location(s): Posterior neck, bilateral feet  Pain Scale:   5-7/10  Quality: Sharp, burning, aching  24 hour history: No acute events overnight, patient reports overall pain management has been much improved since admission to hospital   Patient has been up and ambulatory, continues to report burning pain to bilateral lower extremities secondary to wounds and edema  Denies any opioid-induced side effects, no reports of nausea/vomiting, constipation/diarrhea      Opioid requirement previous 24 hours:   10 milligrams oral Dilaudid    Meds/Allergies   all current active meds have been reviewed and current meds:   Current Facility-Administered Medications   Medication Dose Route Frequency   • acetaminophen (TYLENOL) tablet 975 mg  975 mg Oral Q8H [address]   • albumin human (FLEXBUMIN) 25 % injection 12 5 g  12 5 g Intravenous Once   • allopurinol (ZYLOPRIM) tablet 200 mg  200 mg Oral Daily   • atorvastatin (LIPITOR) tablet 40 mg  40 mg Oral Daily   • docusate sodium (COLACE) capsule 100 mg  100 mg Oral BID   • gabapentin (NEURONTIN) capsule 100 mg  100 mg Oral TID   • heparin (porcine) subcutaneous injection 7,500 Units  7,500 Units Subcutaneous Q8H [address]   • HYDROmorphone (DILAUDID) tablet 2 mg  2 mg Oral Q4H PRN   • HYDROmorphone (DILAUDID) tablet 4 mg  4 mg Oral Q4H PRN   • insulin lispro (HumaLOG) 100 units/mL subcutaneous injection 2-12 Units  2-12 Units Subcutaneous TID AC   • insulin lispro (HumaLOG) 100 units/mL subcutaneous injection 2-12 Units  2-12 Units Subcutaneous HS   • isosorbide mononitrate (IMDUR) 24 hr tablet 30 mg  30 mg Oral Daily   • lidocaine (LIDODERM) 5 % patch 1 patch  1 patch Topical Daily   • methocarbamol (ROBAXIN) tablet 500 mg  500 mg Oral Q6H [address]   • metoprolol tartrate (LOPRESSOR) tablet 25 mg  25 mg Oral Q12H [address]   • oxybutynin (DITROPAN) tablet 5 mg  5 mg Oral BID   • pantoprazole (PROTONIX) EC tablet 40 mg  40 mg Oral Daily   • polyethylene glycol (MIRALAX) packet 17 g  17 g Oral Daily       Allergies   Allergen Reactions   • Latex Rash       Objective     Temp:  [98 4 °F (36 9 °C)-99 1 °F (37 3 °C)] 98 4 °F (36 9 °C)  HR:  [] 98  Resp:  [14-18] 18  BP: (120-139)/(63-75) 139/75    Physical Exam  Vitals reviewed  Constitutional:       General: She is not in acute distress  Appearance: She is obese  She is not ill-appearing  Cardiovascular:      Rate and Rhythm: Normal rate  Pulmonary:      Effort: Pulmonary effort is normal  No respiratory distress  Abdominal:      General: There is no distension  Tenderness: There is no abdominal tenderness  Musculoskeletal:         General: Tenderness (lower extremities) present  Normal range of motion  Cervical back: Tenderness present  Right lower leg: Edema present  Left lower leg: Edema present  Skin:     General: Skin is warm and dry  Coloration: Skin is not pale  Neurological:      Mental Status: She is alert and oriented to person, place, and time  Mental status is at baseline  Sensory: No sensory deficit  Motor: No weakness  Psychiatric:         Mood and Affect: Mood normal          Behavior: Behavior normal          Lab Results:   Results from last 7 days   Lab Units 05/30/23  0534   HEMATOCRIT % 28 4*   HEMOGLOBIN g/dL 8 8*   PLATELETS Thousands/uL 168   WBC Thousand/uL 8 56      Results from last 7 days   Lab Units 05/30/23  0534 05/27/23  0551 05/26/23  0646   ALK PHOS U/L  --   --  111   ALT U/L  --   --  32   AST U/L  --   --  56*   BUN mg/dL 20   < > 30*   CALCIUM mg/dL 9 2   < > 9 2   CHLORIDE mmol/L 109*   < > 109*   CO2 mmol/L 28   < > 26   CREATININE mg/dL 1 15   < > 1 22   POTASSIUM mmol/L 3 7   < > 4 0    < > = values in this interval not displayed  Imaging Studies: I have personally reviewed pertinent reports  Please note that the APS provides consultative services regarding pain management only  With the exception of ketamine and epidural infusions and except when indicated, final decisions regarding starting or changing doses of analgesic medications are at the discretion of the consulting service  Off hours consultation and/or medication management is generally not available      Alwin Dandy, CRNP  Acute Pain Service

## 2023-05-30 NOTE — PROGRESS NOTES
[facility]  Progress Note  Name: Edith Khan I  MRN: [id number]  [facility] Date of Admission: 5/24/2023   Date of Service: 5/30/2023 I Hospital Day: 5    Assessment/Plan   * Cervical pain  Assessment & Plan  Presents with cervical pain and b/l LE weakness s/p PCDF C3-T1 on 5/18/23 at [facility] with Dr Jonathan Madera (discharged 5/20/23)  No dedicated cervical imaging obtained upon admission  · Neurosurgery consulted, input appreciated  · Ordered upright cervical xrays--per Nsx acceptable alignment and hardware placement   · C-collar in place; per surgeon should be worn at all times during first 2 weeks, starting 6/1 ( 2 weeks post op) may remove at sleep only for the next 4 weeks  · Can shower daily and cover incision daily for the first 14 days post op with a clean dressing  · APS consulted, input appreciated  · Continue Tylenol 975 mg p o  every 8 hours  · Added gabapentin 100 mg p o  3 times daily, added lidocaine patch, added Robaxin 500 mg p o  every 6 hours scheduled  · On admission her p o  oxy was changed to p o  Dilaudid, continue for now  Plan to continue same on discharge  · Bowel regimen in place  · Continue SQ Heparin  · PT/OT recommended rehab initially however seems to be doing better with pain control, at this point plan for home with [facility] [address]  Lower extremity edema with bullae  Assessment & Plan  Presents with increased LE edema with bullae bilateral lower extremities  Not on diuretics at baseline  Likely 2/2 recent surgical procedure and sitting with legs in dependent position for several days at home  Also with albumin 2 4  · Echo without any significant findings, BNP normal  · LE venous duplex negative  · Elevate lower extremities  · Wound care consulted, continue local wound care  · Dermatology consult appreciated, will need outpatient f/u to include possible biopsy  · Podiatry consulted, [facility] per podiatry     • Maxorb, ABD, 4 x 4 and Kerlix to bilateral lower extremities  After most of the bulla have deflated would recommend application of Ace wraps from the toes to below the tibial tuberosities  Hypoxia  Assessment & Plan  Mild; has been intermittently requiring O2 during her stay, mainly HS  · Suspect OHS/PEGGY contributing + pain  · Continue IS/OOB/Pulmonary toilet  · Currently on RA    Anemia  Assessment & Plan  Overall stable  · No s/s active bleeding  · Monitor PRN    Acid reflux  Assessment & Plan  · Continue pantoprazole 40 mg daily    Chronic kidney disease stage III with PHYLLIS  Assessment & Plan  Baseline appears around 1 0-1 2; upon discharge on 5/20 was 1 27  Presented with creatinine 1 62   She received contrast for PE study in ER  · Was initially placed on gentle IVF (75 mL/hr) upon admission--now off   · Holding ACE-inhibitor, can likely resume at discharge   · Urinary retention protocol in place--required straight cath x1 on 5/25 PM however now improved and voiding  · Creat now at baseline  · BMP PRN    Coronary artery disease without angina pectoris  Assessment & Plan  Patient reports MI history, in 2010 with 1 stent placement  · Continue statin, BB, imdur  · Currently holding aspirin 81 mg daily, status post surgery--resume when cleared by her primary surgeon    Elevated d-dimer  Assessment & Plan  CTA PE study negative and main pulmonary artery, distal order branches were not evaluated due to respiratory motion artifact  · Lower extremity duplex negiatve    Type 2 diabetes mellitus with renal complication [facility])  Assessment & Plan  Lab Results   Component Value Date    HGBA1C 6 5 (H) 05/29/2023       Recent Labs     05/29/23  1122 05/29/23  1653 05/29/23  2045 05/30/23  0644   POCGLU 160* 117 141* 143*       Blood Sugar Average: Last 72 hrs:  · Despite well controlled DM, she reports chronic neuropathy   · Home meds held  (metformin 500 mg in the morning, 1000 mg in the evening, glimepiride 8 mg once a day,Pioglitazone hcl 30 mg daily)  · SSI/accuchecks  · Gabapentin initiated by APS          VTE Pharmacologic Prophylaxis: VTE Score: 7 Moderate Risk (Score 3-4) - Pharmacological DVT Prophylaxis Ordered: heparin  Patient Centered Rounds: I performed bedside rounds with nursing staff today  Discussions with Specialists or Other Care Team Provider: nursing, case management     Education and Discussions with Family / Patient: Patient declined call to   Total Time Spent on Date of Encounter in care of patient: 35 minutes This time was spent on one or more of the following: performing physical exam; counseling and coordination of care; obtaining or reviewing history; documenting in the medical record; reviewing/ordering tests, medications or procedures; communicating with other healthcare professionals and discussing with patient's family/caregivers  Current Length of Stay: 5 day(s)  Current Patient Status: Inpatient   Certification Statement: The patient will continue to require additional inpatient hospital stay due to pain control, [address], work with PT/OT, discharge coordination/planning  Discharge Plan: Anticipate discharge tomorrow to home with home services  Code Status: Level 1 - Full Code    Subjective:   Patient reports that neck pain is controlled  Leg pain somewhat better, she did ambulate in the logan today, had to take several breaks due to burning in her lower extremities  She would like to be discharged soon, would like to work towards that for tomorrow  Anxious about discharge plan regarding visiting nurses and wound care  We will plan to discuss with case management today  Objective:     Vitals:   Temp (24hrs), Av 8 °F (37 1 °C), Min:98 4 °F (36 9 °C), Max:99 1 °F (37 3 °C)    Temp:  [98 4 °F (36 9 °C)-99 1 °F (37 3 °C)] 98 4 °F (36 9 °C)  HR:  [] 61  Resp:  [14-18] 18  BP: (120-130)/(63-68) 130/65  SpO2:  [90 %-94 %] 90 %  Body mass index is 45 49 kg/m²  Input and Output Summary (last 24 hours): Intake/Output Summary (Last 24 hours) at 5/30/2023 0849  Last data filed at 5/30/2023 6045  Gross per 24 hour   Intake 950 ml   Output 1200 ml   Net -250 ml       Physical Exam:   Physical Exam  Vitals and nursing note reviewed  Constitutional:       General: She is not in acute distress  Appearance: She is obese  Neck:      Comments: Cervical collar in place   Cardiovascular:      Rate and Rhythm: Normal rate  Pulmonary:      Effort: No respiratory distress  Abdominal:      Tenderness: There is no abdominal tenderness  Musculoskeletal:         General: Swelling present  Skin:     General: Skin is warm  Coloration: Skin is pale  Comments: Lower extremities wrapped    Neurological:      Mental Status: She is alert and oriented to person, place, and time  Mental status is at baseline  Psychiatric:         Mood and Affect: Mood normal           Additional Data:     Labs:  Results from last 7 days   Lab Units 05/30/23  0534 05/29/23  0451 05/27/23  0551   BANDS PCT %  --   --  2   EOS PCT %  --  3 3   HEMATOCRIT % 28 4* 26 7* 25 9*   HEMOGLOBIN g/dL 8 8* 8 7* 8 5*   LYMPHS PCT %  --  24  --    LYMPHO PCT %  --   --  15   MONOS PCT %  --  7  --    MONO PCT %  --   --  2*   NEUTROS PCT %  --  63  --    PLATELETS Thousands/uL 168 210 216   WBC Thousand/uL 8 56 8 92 6 58     Results from last 7 days   Lab Units 05/30/23  0534 05/27/23  0551 05/26/23  0646   ANION GAP mmol/L 0*   < > 1*   ALBUMIN g/dL  --   --  2 4*   ALK PHOS U/L  --   --  111   ALT U/L  --   --  32   AST U/L  --   --  56*   BUN mg/dL 20   < > 30*   CALCIUM mg/dL 9 2   < > 9 2   CHLORIDE mmol/L 109*   < > 109*   CO2 mmol/L 28   < > 26   CREATININE mg/dL 1 15   < > 1 22   GLUCOSE RANDOM mg/dL 143*   < > 129   POTASSIUM mmol/L 3 7   < > 4 0   SODIUM mmol/L 137   < > 136   TOTAL BILIRUBIN mg/dL  --   --  0 43    < > = values in this interval not displayed           Results from last 7 days   Lab Units 05/30/23  0644 05/29/23  2045 05/29/23  1653 05/29/23  1122 05/29/23  0618 05/28/23 2048 05/28/23  1601 05/28/23  1134 05/28/23  0611 05/27/23 2042 05/27/23  1544 05/27/23  1128   POC GLUCOSE mg/dl 143* 141* 117 160* 155* 152* 138 152* 147* 147* 125 157*     Results from last 7 days   Lab Units 05/29/23  0451   HEMOGLOBIN A1C % 6 5*           Lines/Drains:  Invasive Devices     Peripheral Intravenous Line  Duration           Peripheral IV 05/29/23 Proximal;Right;Ventral (anterior) Forearm 1 day          Drain  Duration           External Urinary Catheter 4 days                      Imaging: No pertinent imaging reviewed      Recent Cultures (last 7 days):         Last 24 Hours Medication List:   Current Facility-Administered Medications   Medication Dose Route Frequency Provider Last Rate   • acetaminophen  975 mg Oral Q8H [address] ROSE MARY Vela     • Albumin 25%  12 5 g Intravenous Once ROSE MARY Segovia     • allopurinol  200 mg Oral Daily ROSE MARY Romeo     • atorvastatin  40 mg Oral Daily ROSE MARY Romeo     • docusate sodium  100 mg Oral BID ROSE MARY Romeo     • gabapentin  100 mg Oral TID Evelyne Dan PA-C     • heparin (porcine)  7,500 Units Subcutaneous [facility] Robbie Deng PA-C     • HYDROmorphone  2 mg Oral Q4H PRN ROSE MARY Romeo     • HYDROmorphone  4 mg Oral Q4H PRN ROSE MARY Romeo     • insulin lispro  2-12 Units Subcutaneous TID AC ROSE MARY Romeo     • insulin lispro  2-12 Units Subcutaneous HS ROSE MARY Romeo     • isosorbide mononitrate  30 mg Oral Daily ROSE MARY Romeo     • lidocaine  1 patch Topical Daily Evelyne Dan PA-C     • methocarbamol  500 mg Oral Q6H [address] Evelyne Dan PA-C     • metoprolol tartrate  25 mg Oral Q12H [address] ROSE MARY Romeo     • oxybutynin  5 mg Oral BID ROSE MARY Romeo     • pantoprazole  40 mg Oral Daily ROSE MARY Romeo     • polyethylene glycol  17 g Oral Daily Idalia Davenport, ROSE MARY          Today, Patient Was Seen By: ROSE MARY Paredes    **Please Note: This note may have been constructed using a voice recognition system  **

## 2023-05-30 NOTE — ASSESSMENT & PLAN NOTE
Baseline appears around 1 0-1 2; upon discharge on 5/20 was 1 27  Presented with creatinine 1 62   She received contrast for PE study in ER  · Was initially placed on gentle IVF (75 mL/hr) upon admission--now off   · Holding ACE-inhibitor, can likely resume at discharge   · Urinary retention protocol in place--required straight cath x1 on 5/25 PM however now improved and voiding  · Creat now at baseline  · BMP PRN

## 2023-05-30 NOTE — ASSESSMENT & PLAN NOTE
Presents with increased LE edema with bullae bilateral lower extremities  Not on diuretics at baseline  Likely 2/2 recent surgical procedure and sitting with legs in dependent position for several days at home  Also with albumin 2 4  · Echo without any significant findings, BNP normal  · LE venous duplex negative  · Elevate lower extremities  · Wound care consulted, continue local wound care  · Dermatology consult appreciated, will need outpatient f/u to include possible biopsy  · Podiatry consulted, [facility] per podiatry  • Maxorb, ABD, 4 x 4 and Kerlix to bilateral lower extremities  After most of the bulla have deflated would recommend application of Ace wraps from the toes to below the tibial tuberosities

## 2023-05-30 NOTE — ASSESSMENT & PLAN NOTE
Mild; has been intermittently requiring O2 during her stay, mainly HS     · Suspect OHS/PEGGY contributing + pain  · Continue IS/OOB/Pulmonary toilet  · Currently on RA

## 2023-05-30 NOTE — PLAN OF CARE
Problem: Potential for Falls  Goal: Patient will remain free of falls  Description: INTERVENTIONS:  - Educate patient/family on patient safety including physical limitations  - Instruct patient to call for assistance with activity   - Consult OT/PT to assist with strengthening/mobility   - Keep Call bell within reach  - Keep bed low and locked with side rails adjusted as appropriate  - Keep care items and personal belongings within reach  - Initiate and maintain comfort rounds  - Make Fall Risk Sign visible to staff  - Offer Toileting every 2 Hours, in advance of need  - Initiate/Maintain alarm  - Obtain necessary fall risk management equipment:   - Apply yellow socks and bracelet for high fall risk patients  - Consider moving patient to room near nurses station  Outcome: Progressing     Problem: NEUROSENSORY - ADULT  Goal: Achieves maximal functionality and self care  Description: INTERVENTIONS  - Monitor swallowing and airway patency with patient fatigue and changes in neurological status  - Encourage and assist patient to increase activity and self care     - Encourage visually impaired, hearing impaired and aphasic patients to use assistive/communication devices  Outcome: Progressing     Problem: CARDIOVASCULAR - ADULT  Goal: Absence of cardiac dysrhythmias or at baseline rhythm  Description: INTERVENTIONS:  - Continuous cardiac monitoring, vital signs, obtain 12 lead EKG if ordered  - Administer antiarrhythmic and heart rate control medications as ordered  - Monitor electrolytes and administer replacement therapy as ordered  Outcome: Progressing     Problem: METABOLIC, FLUID AND ELECTROLYTES - ADULT  Goal: Electrolytes maintained within normal limits  Description: INTERVENTIONS:  - Monitor labs and assess patient for signs and symptoms of electrolyte imbalances  - Administer electrolyte replacement as ordered  - Monitor response to electrolyte replacements, including repeat lab results as appropriate  - Instruct patient on fluid and nutrition as appropriate  Outcome: Progressing  Goal: Fluid balance maintained  Description: INTERVENTIONS:  - Monitor labs   - Monitor I/O and WT  - Instruct patient on fluid and nutrition as appropriate  - Assess for signs & symptoms of volume excess or deficit  Outcome: Progressing  Goal: Glucose maintained within target range  Description: INTERVENTIONS:  - Monitor Blood Glucose as ordered  - Assess for signs and symptoms of hyperglycemia and hypoglycemia  - Administer ordered medications to maintain glucose within target range  - Assess nutritional intake and initiate nutrition service referral as needed  Outcome: Progressing     Problem: HEMATOLOGIC - ADULT  Goal: Maintains hematologic stability  Description: INTERVENTIONS  - Assess for signs and symptoms of bleeding or hemorrhage  - Monitor labs  - Administer supportive blood products/factors as ordered and appropriate  Outcome: Progressing     Problem: MUSCULOSKELETAL - ADULT  Goal: Maintain or return mobility to safest level of function  Description: INTERVENTIONS:  - Assess patient's ability to carry out ADLs; assess patient's baseline for ADL function and identify physical deficits which impact ability to perform ADLs (bathing, care of mouth/teeth, toileting, grooming, dressing, etc )  - Assess/evaluate cause of self-care deficits   - Assess range of motion  - Assess patient's mobility  - Assess patient's need for assistive devices and provide as appropriate  - Encourage maximum independence but intervene and supervise when necessary  - Involve family in performance of ADLs  - Assess for home care needs following discharge   - Consider OT consult to assist with ADL evaluation and planning for discharge  - Provide patient education as appropriate  Outcome: Progressing  Goal: Maintain proper alignment of affected body part  Description: INTERVENTIONS:  - Support, maintain and protect limb and body alignment  - Provide patient/ family with appropriate education  Outcome: Progressing     Problem: PAIN - ADULT  Goal: Verbalizes/displays adequate comfort level or baseline comfort level  Description: Interventions:  - Encourage patient to monitor pain and request assistance  - Assess pain using appropriate pain scale  - Administer analgesics based on type and severity of pain and evaluate response  - Implement non-pharmacological measures as appropriate and evaluate response  - Consider cultural and social influences on pain and pain management  - Notify physician/advanced practitioner if interventions unsuccessful or patient reports new pain  Outcome: Progressing     Problem: INFECTION - ADULT  Goal: Absence or prevention of progression during hospitalization  Description: INTERVENTIONS:  - Assess and monitor for signs and symptoms of infection  - Monitor lab/diagnostic results  - Monitor all insertion sites, i e  indwelling lines, tubes, and drains  - Monitor endotracheal if appropriate and nasal secretions for changes in amount and color  - [place] appropriate cooling/warming therapies per order  - Administer medications as ordered  - Instruct and encourage patient and family to use good hand hygiene technique  - Identify and instruct in appropriate isolation precautions for identified infection/condition  Outcome: Progressing  Goal: Absence of fever/infection during neutropenic period  Description: INTERVENTIONS:  - Monitor WBC    Outcome: Progressing     Problem: SAFETY ADULT  Goal: Patient will remain free of falls  Description: INTERVENTIONS:  - Educate patient/family on patient safety including physical limitations  - Instruct patient to call for assistance with activity   - Consult OT/PT to assist with strengthening/mobility   - Keep Call bell within reach  - Keep bed low and locked with side rails adjusted as appropriate  - Keep care items and personal belongings within reach  - Initiate and maintain comfort rounds  - Make Fall Risk Sign visible to staff  - Offer Toileting every  Hours, in advance of need  - Initiate/Maintain alarm  - Obtain necessary fall risk management equipment:   - Apply yellow socks and bracelet for high fall risk patients  - Consider moving patient to room near nurses station  Outcome: Progressing  Goal: Maintain or return to baseline ADL function  Description: INTERVENTIONS:  -  Assess patient's ability to carry out ADLs; assess patient's baseline for ADL function and identify physical deficits which impact ability to perform ADLs (bathing, care of mouth/teeth, toileting, grooming, dressing, etc )  - Assess/evaluate cause of self-care deficits   - Assess range of motion  - Assess patient's mobility; develop plan if impaired  - Assess patient's need for assistive devices and provide as appropriate  - Encourage maximum independence but intervene and supervise when necessary  - Involve family in performance of ADLs  - Assess for home care needs following discharge   - Consider OT consult to assist with ADL evaluation and planning for discharge  - Provide patient education as appropriate  Outcome: Progressing  Goal: Maintains/Returns to pre admission functional level  Description: INTERVENTIONS:  - Perform BMAT or MOVE assessment daily    - Set and communicate daily mobility goal to care team and patient/family/caregiver  - Collaborate with rehabilitation services on mobility goals if consulted  - Perform Range of Motion  times a day  - Reposition patient every hours    - Dangle patient times a day  - Stand patient  times a day  - Ambulate patient  times a day  - Out of bed to chair  times a day   - Out of bed for meals  times a day  - Out of bed for toileting  - Record patient progress and toleration of activity level   Outcome: Progressing     Problem: DISCHARGE PLANNING  Goal: Discharge to home or other facility with appropriate resources  Description: INTERVENTIONS:  - Identify barriers to discharge w/patient and caregiver  - Arrange for needed discharge resources and transportation as appropriate  - Identify discharge learning needs (meds, wound care, etc )  - Arrange for interpretive services to assist at discharge as needed  - Refer to Case Management Department for coordinating discharge planning if the patient needs post-hospital services based on physician/advanced practitioner order or complex needs related to functional status, cognitive ability, or social support system  Outcome: Progressing     Problem: Knowledge Deficit  Goal: Patient/family/caregiver demonstrates understanding of disease process, treatment plan, medications, and discharge instructions  Description: Complete learning assessment and assess knowledge base    Interventions:  - Provide teaching at level of understanding  - Provide teaching via preferred learning methods  Outcome: Progressing     Problem: SKIN/TISSUE INTEGRITY - ADULT  Goal: Skin Integrity remains intact(Skin Breakdown Prevention)  Description: Assess:  -Perform Miguel assessment every   -Clean and moisturize skin every   -Inspect skin when repositioning, toileting, and assisting with ADLS  -Assess under medical devices such as  every   -Assess extremities for adequate circulation and sensation     Bed Management:  -Have minimal linens on bed & keep smooth, unwrinkled  -Change linens as needed when moist or perspiring  -Avoid sitting or lying in one position for more than  hours while in bed  -Keep HOB at degrees     Toileting:  -Offer bedside commode  -Assess for incontinence every   -Use incontinent care products after each incontinent episode such as     Activity:  -Mobilize patient  times a day  -Encourage activity and walks on unit  -Encourage or provide ROM exercises   -Turn and reposition patient every  Hours  -Use appropriate equipment to lift or move patient in bed  -Instruct/ Assist with weight shifting every  when out of bed in chair  -Consider limitation of chair time  hour intervals    Skin Care:  -Avoid use of baby powder, tape, friction and shearing, hot water or constrictive clothing  -Relieve pressure over bony prominences using   -Do not massage red bony areas    Next Steps:  -Teach patient strategies to minimize risks such as    -Consider consults to  interdisciplinary teams such as   Outcome: Progressing  Goal: Incision(s), wounds(s) or drain site(s) healing without S/S of infection  Description: INTERVENTIONS  - Assess and document dressing, incision, wound bed, drain sites and surrounding tissue  - Provide patient and family education  - Perform skin care/dressing changes every   Outcome: Progressing     Problem: MOBILITY - ADULT  Goal: Maintain or return to baseline ADL function  Description: INTERVENTIONS:  -  Assess patient's ability to carry out ADLs; assess patient's baseline for ADL function and identify physical deficits which impact ability to perform ADLs (bathing, care of mouth/teeth, toileting, grooming, dressing, etc )  - Assess/evaluate cause of self-care deficits   - Assess range of motion  - Assess patient's mobility; develop plan if impaired  - Assess patient's need for assistive devices and provide as appropriate  - Encourage maximum independence but intervene and supervise when necessary  - Involve family in performance of ADLs  - Assess for home care needs following discharge   - Consider OT consult to assist with ADL evaluation and planning for discharge  - Provide patient education as appropriate  Outcome: Progressing  Goal: Maintains/Returns to pre admission functional level  Description: INTERVENTIONS:  - Perform BMAT or MOVE assessment daily    - Set and communicate daily mobility goal to care team and patient/family/caregiver  - Collaborate with rehabilitation services on mobility goals if consulted  - Perform Range of Motion  times a day  - Reposition patient every  hours    - Dangle patient  times a day  - Stand patient  times a day  - Ambulate patient  times a day  - Out of bed to chair  times a day   - Out of bed for meals  times a day  - Out of bed for toileting  - Record patient progress and toleration of activity level   Outcome: Progressing     Problem: Prexisting or High Potential for Compromised Skin Integrity  Goal: Skin integrity is maintained or improved  Description: INTERVENTIONS:  - Identify patients at risk for skin breakdown  - Assess and monitor skin integrity  - Assess and monitor nutrition and hydration status  - Monitor labs   - Assess for incontinence   - Turn and reposition patient  - Assist with mobility/ambulation  - Relieve pressure over bony prominences  - Avoid friction and shearing  - Provide appropriate hygiene as needed including keeping skin clean and dry  - Evaluate need for skin moisturizer/barrier cream  - Collaborate with interdisciplinary team   - Patient/family teaching  - Consider wound care consult   Outcome: Progressing

## 2023-05-30 NOTE — ASSESSMENT & PLAN NOTE
Lab Results   Component Value Date    HGBA1C 6 5 (H) 05/29/2023       Recent Labs     05/29/23  1122 05/29/23  1653 05/29/23 2045 05/30/23  0644   POCGLU 160* 117 141* 143*       Blood Sugar Average: Last 72 hrs:  · Despite well controlled DM, she reports chronic neuropathy   · Home meds held  (metformin 500 mg in the morning, 1000 mg in the evening, glimepiride 8 mg once a day,Pioglitazone hcl 30 mg daily)  · SSI/accuchecks  · Gabapentin initiated by APS

## 2023-05-30 NOTE — CASE MANAGEMENT
Case Management Discharge Planning Note    Patient name Nuha Arriaza  Location [address]/[facility] 585-32 MRN [id number]  : 1955 Date 2023       Current Admission Date: 2023  Current Admission Diagnosis:Cervical pain   Patient Active Problem List    Diagnosis Date Noted   • Hypoxia 2023   • Cervical pain 2023   • Chronic kidney disease stage III with PHYLLIS 2023   • Type 2 diabetes mellitus with renal complication ([facility] [address] )    • Coronary artery disease without angina pectoris 2023   • Acid reflux 2023   • Lower extremity edema with bullae 2023   • Elevated d-dimer 2023   • Anemia 2023      LOS (days): 5  Geometric Mean LOS (GMLOS) (days): 2 80  Days to GMLOS:-2 6     OBJECTIVE:  Risk of Unplanned Readmission Score: 11 99         Current admission status: Inpatient   Preferred Pharmacy:   [facility]/pharmacy #[id number] Jurgen Richards, [address] [place] [id number]  Phone: [phone] Fax: [phone]    Primary Care Provider: Nadeem Potts MD    Primary Insurance: MEDICARE  Secondary Insurance: Dot [employer]    DISCHARGE DETAILS:    IMM Given (Date):: 23  IMM Given to[de-identified] Patient     Message sent to [facility] via [address], requesting SN, PT, and OT, SN for wound dressing/ teaching

## 2023-05-31 VITALS
DIASTOLIC BLOOD PRESSURE: 71 MMHG | RESPIRATION RATE: 18 BRPM | HEART RATE: 70 BPM | BODY MASS INDEX: 45.24 KG/M2 | HEIGHT: 64 IN | OXYGEN SATURATION: 97 % | SYSTOLIC BLOOD PRESSURE: 124 MMHG | TEMPERATURE: 97.1 F | WEIGHT: 265 LBS

## 2023-05-31 PROBLEM — R09.02 HYPOXIA: Status: RESOLVED | Noted: 2023-05-28 | Resolved: 2023-05-31

## 2023-05-31 LAB
ACANTHOCYTES BLD QL SMEAR: PRESENT
ANISOCYTOSIS BLD QL SMEAR: PRESENT
BASOPHILS # BLD MANUAL: 0 THOUSAND/UL (ref 0–0.1)
BASOPHILS NFR MAR MANUAL: 0 % (ref 0–1)
EOSINOPHIL # BLD MANUAL: 0.09 THOUSAND/UL (ref 0–0.4)
EOSINOPHIL NFR BLD MANUAL: 1 % (ref 0–6)
ERYTHROCYTE [DISTWIDTH] IN BLOOD BY AUTOMATED COUNT: 15.4 % (ref 11.6–15.1)
GLUCOSE SERPL-MCNC: 144 MG/DL (ref 65–140)
GLUCOSE SERPL-MCNC: 145 MG/DL (ref 65–140)
HCT VFR BLD AUTO: 28.4 % (ref 34.8–46.1)
HGB BLD-MCNC: 8.8 G/DL (ref 11.5–15.4)
LYMPHOCYTES # BLD AUTO: 1.46 THOUSAND/UL (ref 0.6–4.47)
LYMPHOCYTES # BLD AUTO: 17 % (ref 14–44)
MCH RBC QN AUTO: 29.3 PG (ref 26.8–34.3)
MCHC RBC AUTO-ENTMCNC: 31 G/DL (ref 31.4–37.4)
MCV RBC AUTO: 95 FL (ref 82–98)
MONOCYTES # BLD AUTO: 0.26 THOUSAND/UL (ref 0–1.22)
MONOCYTES NFR BLD: 3 % (ref 4–12)
MYELOCYTES NFR BLD MANUAL: 1 % (ref 0–1)
NEUTROPHILS # BLD MANUAL: 6.51 THOUSAND/UL (ref 1.85–7.62)
NEUTS SEG NFR BLD AUTO: 76 % (ref 43–75)
NRBC BLD AUTO-RTO: 1 /100 WBC (ref 0–2)
PATHOLOGY REVIEW: YES
PLATELET # BLD AUTO: 168 THOUSANDS/UL (ref 149–390)
PLATELET BLD QL SMEAR: ADEQUATE
PMV BLD AUTO: 11.3 FL (ref 8.9–12.7)
POIKILOCYTOSIS BLD QL SMEAR: PRESENT
RBC # BLD AUTO: 3 MILLION/UL (ref 3.81–5.12)
RBC MORPH BLD: PRESENT
VARIANT LYMPHS # BLD AUTO: 2 %
WBC # BLD AUTO: 8.56 THOUSAND/UL (ref 4.31–10.16)

## 2023-05-31 PROCEDURE — 99239 HOSP IP/OBS DSCHRG MGMT >30: CPT | Performed by: NURSE PRACTITIONER

## 2023-05-31 PROCEDURE — 82948 REAGENT STRIP/BLOOD GLUCOSE: CPT

## 2023-05-31 RX ORDER — ACETAMINOPHEN 325 MG/1
975 TABLET ORAL EVERY 8 HOURS SCHEDULED
Refills: 0
Start: 2023-05-31 | End: 2023-05-31 | Stop reason: SDUPTHER

## 2023-05-31 RX ORDER — GABAPENTIN 100 MG/1
200 CAPSULE ORAL 2 TIMES DAILY
Qty: 120 CAPSULE | Refills: 0 | Status: SHIPPED | OUTPATIENT
Start: 2023-05-31 | End: 2023-06-30

## 2023-05-31 RX ORDER — METHOCARBAMOL 500 MG/1
500 TABLET, FILM COATED ORAL EVERY 6 HOURS PRN
Qty: 30 TABLET | Refills: 0 | Status: SHIPPED | OUTPATIENT
Start: 2023-05-31

## 2023-05-31 RX ORDER — HYDROMORPHONE HYDROCHLORIDE 2 MG/1
TABLET ORAL
Qty: 40 TABLET | Refills: 0 | Status: SHIPPED | OUTPATIENT
Start: 2023-05-31

## 2023-05-31 RX ORDER — POLYETHYLENE GLYCOL 3350 17 G/17G
17 POWDER, FOR SOLUTION ORAL DAILY
Refills: 0
Start: 2023-06-01

## 2023-05-31 RX ORDER — METHOCARBAMOL 500 MG/1
500 TABLET, FILM COATED ORAL EVERY 6 HOURS PRN
Qty: 30 TABLET | Refills: 0 | Status: SHIPPED | OUTPATIENT
Start: 2023-05-31 | End: 2023-05-31 | Stop reason: SDUPTHER

## 2023-05-31 RX ORDER — GABAPENTIN 100 MG/1
200 CAPSULE ORAL 2 TIMES DAILY
Qty: 120 CAPSULE | Refills: 0 | Status: SHIPPED | OUTPATIENT
Start: 2023-05-31 | End: 2023-05-31 | Stop reason: SDUPTHER

## 2023-05-31 RX ORDER — HYDROMORPHONE HYDROCHLORIDE 2 MG/1
TABLET ORAL
Qty: 40 TABLET | Refills: 0 | Status: SHIPPED | OUTPATIENT
Start: 2023-05-31 | End: 2023-05-31 | Stop reason: SDUPTHER

## 2023-05-31 RX ORDER — ACETAMINOPHEN 325 MG/1
975 TABLET ORAL EVERY 8 HOURS SCHEDULED
Refills: 0
Start: 2023-05-31

## 2023-05-31 RX ORDER — ALBUMIN (HUMAN) 12.5 G/50ML
12.5 SOLUTION INTRAVENOUS ONCE
Status: DISCONTINUED | OUTPATIENT
Start: 2023-05-31 | End: 2023-05-31 | Stop reason: HOSPADM

## 2023-05-31 RX ADMIN — HYDROMORPHONE HYDROCHLORIDE 4 MG: 2 TABLET ORAL at 03:09

## 2023-05-31 RX ADMIN — METHOCARBAMOL TABLETS 500 MG: 500 TABLET, COATED ORAL at 05:21

## 2023-05-31 RX ADMIN — HEPARIN SODIUM 7500 UNITS: 5000 INJECTION INTRAVENOUS; SUBCUTANEOUS at 05:22

## 2023-05-31 RX ADMIN — METHOCARBAMOL TABLETS 500 MG: 500 TABLET, COATED ORAL at 12:28

## 2023-05-31 RX ADMIN — ACETAMINOPHEN 975 MG: 325 TABLET ORAL at 12:28

## 2023-05-31 RX ADMIN — PANTOPRAZOLE SODIUM 40 MG: 40 TABLET, DELAYED RELEASE ORAL at 05:21

## 2023-05-31 RX ADMIN — GABAPENTIN 200 MG: 100 CAPSULE ORAL at 08:06

## 2023-05-31 RX ADMIN — ACETAMINOPHEN 975 MG: 325 TABLET ORAL at 05:21

## 2023-05-31 RX ADMIN — ISOSORBIDE MONONITRATE 30 MG: 30 TABLET, EXTENDED RELEASE ORAL at 08:05

## 2023-05-31 RX ADMIN — LIDOCAINE 5% 1 PATCH: 700 PATCH TOPICAL at 08:06

## 2023-05-31 RX ADMIN — HYDROMORPHONE HYDROCHLORIDE 4 MG: 2 TABLET ORAL at 08:05

## 2023-05-31 RX ADMIN — METOPROLOL TARTRATE 25 MG: 25 TABLET, FILM COATED ORAL at 08:05

## 2023-05-31 RX ADMIN — HYDROMORPHONE HYDROCHLORIDE 4 MG: 2 TABLET ORAL at 13:20

## 2023-05-31 RX ADMIN — OXYBUTYNIN CHLORIDE 5 MG: 5 TABLET ORAL at 08:06

## 2023-05-31 NOTE — RESTORATIVE TECHNICIAN NOTE
Restorative Technician Note      Patient Name: [facility]     Restorative Tech Visit Date: 05/31/23  Note Type: Mobility  Patient Position Upon Consult: Bedside chair  Activity Performed: Ambulated (to the br)  Assistive Device: Roller walker  Patient Position at End of Consult: Bedside chair;  All needs within reach    Gabrielle Covarrubias Restorative Technician

## 2023-05-31 NOTE — PLAN OF CARE
Problem: Potential for Falls  Goal: Patient will remain free of falls  Description: INTERVENTIONS:  - Educate patient/family on patient safety including physical limitations  - Instruct patient to call for assistance with activity   - Consult OT/PT to assist with strengthening/mobility   - Keep Call bell within reach  - Keep bed low and locked with side rails adjusted as appropriate  - Keep care items and personal belongings within reach  - Initiate and maintain comfort rounds  - Make Fall Risk Sign visible to staff  - Offer Toileting every 2 Hours, in advance of need  - Initiate/Maintain alarm  - Obtain necessary fall risk management equipment:   - Apply yellow socks and bracelet for high fall risk patients  - Consider moving patient to room near nurses station  Outcome: Progressing     Problem: NEUROSENSORY - ADULT  Goal: Achieves maximal functionality and self care  Description: INTERVENTIONS  - Monitor swallowing and airway patency with patient fatigue and changes in neurological status  - Encourage and assist patient to increase activity and self care     - Encourage visually impaired, hearing impaired and aphasic patients to use assistive/communication devices  Outcome: Progressing     Problem: CARDIOVASCULAR - ADULT  Goal: Absence of cardiac dysrhythmias or at baseline rhythm  Description: INTERVENTIONS:  - Continuous cardiac monitoring, vital signs, obtain 12 lead EKG if ordered  - Administer antiarrhythmic and heart rate control medications as ordered  - Monitor electrolytes and administer replacement therapy as ordered  Outcome: Progressing     Problem: METABOLIC, FLUID AND ELECTROLYTES - ADULT  Goal: Electrolytes maintained within normal limits  Description: INTERVENTIONS:  - Monitor labs and assess patient for signs and symptoms of electrolyte imbalances  - Administer electrolyte replacement as ordered  - Monitor response to electrolyte replacements, including repeat lab results as appropriate  - Instruct patient on fluid and nutrition as appropriate  Outcome: Progressing  Goal: Fluid balance maintained  Description: INTERVENTIONS:  - Monitor labs   - Monitor I/O and WT  - Instruct patient on fluid and nutrition as appropriate  - Assess for signs & symptoms of volume excess or deficit  Outcome: Progressing  Goal: Glucose maintained within target range  Description: INTERVENTIONS:  - Monitor Blood Glucose as ordered  - Assess for signs and symptoms of hyperglycemia and hypoglycemia  - Administer ordered medications to maintain glucose within target range  - Assess nutritional intake and initiate nutrition service referral as needed  Outcome: Progressing     Problem: HEMATOLOGIC - ADULT  Goal: Maintains hematologic stability  Description: INTERVENTIONS  - Assess for signs and symptoms of bleeding or hemorrhage  - Monitor labs  - Administer supportive blood products/factors as ordered and appropriate  Outcome: Progressing     Problem: MUSCULOSKELETAL - ADULT  Goal: Maintain or return mobility to safest level of function  Description: INTERVENTIONS:  - Assess patient's ability to carry out ADLs; assess patient's baseline for ADL function and identify physical deficits which impact ability to perform ADLs (bathing, care of mouth/teeth, toileting, grooming, dressing, etc )  - Assess/evaluate cause of self-care deficits   - Assess range of motion  - Assess patient's mobility  - Assess patient's need for assistive devices and provide as appropriate  - Encourage maximum independence but intervene and supervise when necessary  - Involve family in performance of ADLs  - Assess for home care needs following discharge   - Consider OT consult to assist with ADL evaluation and planning for discharge  - Provide patient education as appropriate  Outcome: Progressing  Goal: Maintain proper alignment of affected body part  Description: INTERVENTIONS:  - Support, maintain and protect limb and body alignment  - Provide patient/ family with appropriate education  Outcome: Progressing     Problem: SKIN/TISSUE INTEGRITY - ADULT  Goal: Skin Integrity remains intact(Skin Breakdown Prevention)  Description: Assess:  -Perform Miguel assessment   -Clean and moisturize skin   -Inspect skin when repositioning, toileting, and assisting with ADLS  -Assess under medical devices   -Assess extremities for adequate circulation and sensation     Bed Management:  -Have minimal linens on bed & keep smooth, unwrinkled  -Change linens as needed when moist or perspiring  -Avoid sitting or lying in one position for more than2 hours while in bed  -Keep HOB at 30 degrees     Toileting:  -Offer bedside commode  -Assess for incontinence   -Use incontinent care products after each incontinent episode     Activity:  -Mobilize patient 3 times a day  -Encourage activity and walks on unit  -Encourage or provide ROM exercises   -Turn and reposition patient every 2 Hours  -Use appropriate equipment to lift or move patient in bed  -Instruct/ Assist with weight shifting every 2 when out of bed in chair  -Consider limitation of chair time 2 hour intervals    Skin Care:  -Avoid use of baby powder, tape, friction and shearing, hot water or constrictive clothing  -Relieve pressure over bony prominences   -Do not massage red bony areas    Next Steps:  -Teach patient strategies to minimize risks such   -Consider consults to  interdisciplinary teams such   Outcome: Progressing  Goal: Incision(s), wounds(s) or drain site(s) healing without S/S of infection  Description: INTERVENTIONS  - Assess and document dressing, incision, wound bed, drain sites and surrounding tissue  - Provide patient and family education  - Perform skin care/dressing changes   Outcome: Progressing     Problem: PAIN - ADULT  Goal: Verbalizes/displays adequate comfort level or baseline comfort level  Description: Interventions:  - Encourage patient to monitor pain and request assistance  - Assess pain using appropriate pain scale  - Administer analgesics based on type and severity of pain and evaluate response  - Implement non-pharmacological measures as appropriate and evaluate response  - Consider cultural and social influences on pain and pain management  - Notify physician/advanced practitioner if interventions unsuccessful or patient reports new pain  Outcome: Progressing     Problem: INFECTION - ADULT  Goal: Absence or prevention of progression during hospitalization  Description: INTERVENTIONS:  - Assess and monitor for signs and symptoms of infection  - Monitor lab/diagnostic results  - Monitor all insertion sites, i e  indwelling lines, tubes, and drains  - Monitor endotracheal if appropriate and nasal secretions for changes in amount and color  - [place] appropriate cooling/warming therapies per order  - Administer medications as ordered  - Instruct and encourage patient and family to use good hand hygiene technique  - Identify and instruct in appropriate isolation precautions for identified infection/condition  Outcome: Progressing  Goal: Absence of fever/infection during neutropenic period  Description: INTERVENTIONS:  - Monitor WBC    Outcome: Progressing     Problem: SAFETY ADULT  Goal: Patient will remain free of falls  Description: INTERVENTIONS:  - Educate patient/family on patient safety including physical limitations  - Instruct patient to call for assistance with activity   - Consult OT/PT to assist with strengthening/mobility   - Keep Call bell within reach  - Keep bed low and locked with side rails adjusted as appropriate  - Keep care items and personal belongings within reach  - Initiate and maintain comfort rounds  - Make Fall Risk Sign visible to staff  - Offer Toileting every 2 Hours, in advance of need  - Initiate/Maintain alarm  - Obtain necessary fall risk management equipment:   - Apply yellow socks and bracelet for high fall risk patients  - Consider moving patient to room near nurses station  Outcome: Progressing  Goal: Maintain or return to baseline ADL function  Description: INTERVENTIONS:  -  Assess patient's ability to carry out ADLs; assess patient's baseline for ADL function and identify physical deficits which impact ability to perform ADLs (bathing, care of mouth/teeth, toileting, grooming, dressing, etc )  - Assess/evaluate cause of self-care deficits   - Assess range of motion  - Assess patient's mobility; develop plan if impaired  - Assess patient's need for assistive devices and provide as appropriate  - Encourage maximum independence but intervene and supervise when necessary  - Involve family in performance of ADLs  - Assess for home care needs following discharge   - Consider OT consult to assist with ADL evaluation and planning for discharge  - Provide patient education as appropriate  Outcome: Progressing  Goal: Maintains/Returns to pre admission functional level  Description: INTERVENTIONS:  - Perform BMAT or MOVE assessment daily    - Set and communicate daily mobility goal to care team and patient/family/caregiver  - Collaborate with rehabilitation services on mobility goals if consulted  - Perform Range of Motion 3 times a day  - Reposition patient every 2 hours    - Dangle patient 3 times a day  - Stand patient 3 times a day  - Ambulate patient 3 times a day  - Out of bed to chair 3 times a day   - Out of bed for meals 3 times a day  - Out of bed for toileting  - Record patient progress and toleration of activity level   Outcome: Progressing     Problem: DISCHARGE PLANNING  Goal: Discharge to home or other facility with appropriate resources  Description: INTERVENTIONS:  - Identify barriers to discharge w/patient and caregiver  - Arrange for needed discharge resources and transportation as appropriate  - Identify discharge learning needs (meds, wound care, etc )  - Arrange for interpretive services to assist at discharge as needed  - Refer to Case Management Department for coordinating discharge planning if the patient needs post-hospital services based on physician/advanced practitioner order or complex needs related to functional status, cognitive ability, or social support system  Outcome: Progressing     Problem: Knowledge Deficit  Goal: Patient/family/caregiver demonstrates understanding of disease process, treatment plan, medications, and discharge instructions  Description: Complete learning assessment and assess knowledge base  Interventions:  - Provide teaching at level of understanding  - Provide teaching via preferred learning methods  Outcome: Progressing     Problem: MOBILITY - ADULT  Goal: Maintain or return to baseline ADL function  Description: INTERVENTIONS:  -  Assess patient's ability to carry out ADLs; assess patient's baseline for ADL function and identify physical deficits which impact ability to perform ADLs (bathing, care of mouth/teeth, toileting, grooming, dressing, etc )  - Assess/evaluate cause of self-care deficits   - Assess range of motion  - Assess patient's mobility; develop plan if impaired  - Assess patient's need for assistive devices and provide as appropriate  - Encourage maximum independence but intervene and supervise when necessary  - Involve family in performance of ADLs  - Assess for home care needs following discharge   - Consider OT consult to assist with ADL evaluation and planning for discharge  - Provide patient education as appropriate  Outcome: Progressing  Goal: Maintains/Returns to pre admission functional level  Description: INTERVENTIONS:  - Perform BMAT or MOVE assessment daily    - Set and communicate daily mobility goal to care team and patient/family/caregiver  - Collaborate with rehabilitation services on mobility goals if consulted  - Perform Range of Motion 3 times a day  - Reposition patient every 2 hours    - Dangle patient 3 times a day  - Stand patient 3 times a day  - Ambulate patient 3 times a day  - Out of bed to chair 3 times a day   - Out of bed for meals 3 times a day  - Out of bed for toileting  - Record patient progress and toleration of activity level   Outcome: Progressing     Problem: Prexisting or High Potential for Compromised Skin Integrity  Goal: Skin integrity is maintained or improved  Description: INTERVENTIONS:  - Identify patients at risk for skin breakdown  - Assess and monitor skin integrity  - Assess and monitor nutrition and hydration status  - Monitor labs   - Assess for incontinence   - Turn and reposition patient  - Assist with mobility/ambulation  - Relieve pressure over bony prominences  - Avoid friction and shearing  - Provide appropriate hygiene as needed including keeping skin clean and dry  - Evaluate need for skin moisturizer/barrier cream  - Collaborate with interdisciplinary team   - Patient/family teaching  - Consider wound care consult   Outcome: Progressing

## 2023-05-31 NOTE — PLAN OF CARE
Problem: Potential for Falls  Goal: Patient will remain free of falls  Description: INTERVENTIONS:  - Educate patient/family on patient safety including physical limitations  - Instruct patient to call for assistance with activity   - Consult OT/PT to assist with strengthening/mobility   - Keep Call bell within reach  - Keep bed low and locked with side rails adjusted as appropriate  - Keep care items and personal belongings within reach  - Initiate and maintain comfort rounds  - Make Fall Risk Sign visible to staff  - Offer Toileting every 2 Hours, in advance of need  - Initiate/Maintain alarm  - Obtain necessary fall risk management equipment:   - Apply yellow socks and bracelet for high fall risk patients  - Consider moving patient to room near nurses station  5/31/2023 1359 by Wade Silva RN  Outcome: Completed  5/31/2023 0717 by Wade Silva RN  Outcome: Progressing     Problem: NEUROSENSORY - ADULT  Goal: Achieves maximal functionality and self care  Description: INTERVENTIONS  - Monitor swallowing and airway patency with patient fatigue and changes in neurological status  - Encourage and assist patient to increase activity and self care     - Encourage visually impaired, hearing impaired and aphasic patients to use assistive/communication devices  5/31/2023 1359 by Wade Silva RN  Outcome: Completed  5/31/2023 0717 by Wade Silva RN  Outcome: Progressing     Problem: CARDIOVASCULAR - ADULT  Goal: Absence of cardiac dysrhythmias or at baseline rhythm  Description: INTERVENTIONS:  - Continuous cardiac monitoring, vital signs, obtain 12 lead EKG if ordered  - Administer antiarrhythmic and heart rate control medications as ordered  - Monitor electrolytes and administer replacement therapy as ordered  5/31/2023 1359 by Wade Silva RN  Outcome: Completed  5/31/2023 0717 by Wade Silva RN  Outcome: Progressing     Problem: METABOLIC, FLUID AND ELECTROLYTES - ADULT  Goal: Electrolytes maintained within normal limits  Description: INTERVENTIONS:  - Monitor labs and assess patient for signs and symptoms of electrolyte imbalances  - Administer electrolyte replacement as ordered  - Monitor response to electrolyte replacements, including repeat lab results as appropriate  - Instruct patient on fluid and nutrition as appropriate  5/31/2023 1359 by Finn Philippe RN  Outcome: Completed  5/31/2023 0717 by Finn Philippe RN  Outcome: Progressing  Goal: Fluid balance maintained  Description: INTERVENTIONS:  - Monitor labs   - Monitor I/O and WT  - Instruct patient on fluid and nutrition as appropriate  - Assess for signs & symptoms of volume excess or deficit  5/31/2023 1359 by Finn Philippe RN  Outcome: Completed  5/31/2023 0717 by Finn Philippe RN  Outcome: Progressing  Goal: Glucose maintained within target range  Description: INTERVENTIONS:  - Monitor Blood Glucose as ordered  - Assess for signs and symptoms of hyperglycemia and hypoglycemia  - Administer ordered medications to maintain glucose within target range  - Assess nutritional intake and initiate nutrition service referral as needed  5/31/2023 1359 by Finn Philippe RN  Outcome: Completed  5/31/2023 0717 by Finn Philippe RN  Outcome: Progressing     Problem: HEMATOLOGIC - ADULT  Goal: Maintains hematologic stability  Description: INTERVENTIONS  - Assess for signs and symptoms of bleeding or hemorrhage  - Monitor labs  - Administer supportive blood products/factors as ordered and appropriate  5/31/2023 1359 by Finn Philippe RN  Outcome: Completed  5/31/2023 0717 by Finn Philippe RN  Outcome: Progressing     Problem: MUSCULOSKELETAL - ADULT  Goal: Maintain or return mobility to safest level of function  Description: INTERVENTIONS:  - Assess patient's ability to carry out ADLs; assess patient's baseline for ADL function and identify physical deficits which impact ability to perform ADLs (bathing, care of mouth/teeth, toileting, grooming, dressing, etc )  - Assess/evaluate cause of self-care deficits   - Assess range of motion  - Assess patient's mobility  - Assess patient's need for assistive devices and provide as appropriate  - Encourage maximum independence but intervene and supervise when necessary  - Involve family in performance of ADLs  - Assess for home care needs following discharge   - Consider OT consult to assist with ADL evaluation and planning for discharge  - Provide patient education as appropriate  5/31/2023 1359 by Nic Rowan RN  Outcome: Completed  5/31/2023 0717 by Nic Rowan RN  Outcome: Progressing  Goal: Maintain proper alignment of affected body part  Description: INTERVENTIONS:  - Support, maintain and protect limb and body alignment  - Provide patient/ family with appropriate education  5/31/2023 1359 by Nic Rowan RN  Outcome: Completed  5/31/2023 0717 by Nic Rowan RN  Outcome: Progressing     Problem: SKIN/TISSUE INTEGRITY - ADULT  Goal: Skin Integrity remains intact(Skin Breakdown Prevention)  Description: Assess:  -Perform Miguel assessment   -Clean and moisturize skin   -Inspect skin when repositioning, toileting, and assisting with ADLS  -Assess under medical devices   -Assess extremities for adequate circulation and sensation     Bed Management:  -Have minimal linens on bed & keep smooth, unwrinkled  -Change linens as needed when moist or perspiring  -Avoid sitting or lying in one position for more than 2 hours while in bed  -Keep HOB at 30 degrees     Toileting:  -Offer bedside commode  -Assess for incontinence  -Use incontinent care products after each incontinent episode     Activity:  -Mobilize patient 3 times a day  -Encourage activity and walks on unit  -Encourage or provide ROM exercises   -Turn and reposition patient every 2 Hours  -Use appropriate equipment to lift or move patient in bed  -Instruct/ Assist with weight shifting every 2 when out of bed in chair  -Consider limitation of chair time 2 hour intervals    Skin Care:  -Avoid use of baby powder, tape, friction and shearing, hot water or constrictive clothing  -Relieve pressure over bony prominences   -Do not massage red bony areas    Next Steps:  -Teach patient strategies to minimize risks    -Consider consults to  interdisciplinary teams   5/31/2023 1359 by Keenan Choudhury RN  Outcome: Completed  5/31/2023 0717 by Keenan Choudhury RN  Outcome: Progressing  Goal: Incision(s), wounds(s) or drain site(s) healing without S/S of infection  Description: INTERVENTIONS  - Assess and document dressing, incision, wound bed, drain sites and surrounding tissue  - Provide patient and family education  - Perform skin care/dressing changes   5/31/2023 1359 by Keenan Choudhury RN  Outcome: Completed  5/31/2023 0717 by Keenan Choudhury RN  Outcome: Progressing     Problem: PAIN - ADULT  Goal: Verbalizes/displays adequate comfort level or baseline comfort level  Description: Interventions:  - Encourage patient to monitor pain and request assistance  - Assess pain using appropriate pain scale  - Administer analgesics based on type and severity of pain and evaluate response  - Implement non-pharmacological measures as appropriate and evaluate response  - Consider cultural and social influences on pain and pain management  - Notify physician/advanced practitioner if interventions unsuccessful or patient reports new pain  5/31/2023 1359 by Keenan Choudhury RN  Outcome: Completed  5/31/2023 0717 by Keenan Choudhury RN  Outcome: Progressing     Problem: INFECTION - ADULT  Goal: Absence or prevention of progression during hospitalization  Description: INTERVENTIONS:  - Assess and monitor for signs and symptoms of infection  - Monitor lab/diagnostic results  - Monitor all insertion sites, i e  indwelling lines, tubes, and drains  - Monitor endotracheal if appropriate and nasal secretions for changes in amount and color  - [place] appropriate cooling/warming therapies per order  - Administer medications as ordered  - Instruct and encourage patient and family to use good hand hygiene technique  - Identify and instruct in appropriate isolation precautions for identified infection/condition  5/31/2023 1359 by Kathlyn Lundborg, RN  Outcome: Completed  5/31/2023 0717 by Kathlyn Lundborg, RN  Outcome: Progressing  Goal: Absence of fever/infection during neutropenic period  Description: INTERVENTIONS:  - Monitor WBC    5/31/2023 1359 by Kathlyn Lundborg, RN  Outcome: Completed  5/31/2023 0717 by Kathlyn Lundborg, RN  Outcome: Progressing     Problem: SAFETY ADULT  Goal: Patient will remain free of falls  Description: INTERVENTIONS:  - Educate patient/family on patient safety including physical limitations  - Instruct patient to call for assistance with activity   - Consult OT/PT to assist with strengthening/mobility   - Keep Call bell within reach  - Keep bed low and locked with side rails adjusted as appropriate  - Keep care items and personal belongings within reach  - Initiate and maintain comfort rounds  - Make Fall Risk Sign visible to staff  - Offer Toileting every 2 Hours, in advance of need  - Initiate/Maintain 2alarm  - Obtain necessary fall risk management equipment: 2  - Apply yellow socks and bracelet for high fall risk patients  - Consider moving patient to room near nurses station  5/31/2023 1359 by Kathlyn Lundborg, RN  Outcome: Completed  5/31/2023 0717 by Kathlyn Lundborg, RN  Outcome: Progressing  Goal: Maintain or return to baseline ADL function  Description: INTERVENTIONS:  -  Assess patient's ability to carry out ADLs; assess patient's baseline for ADL function and identify physical deficits which impact ability to perform ADLs (bathing, care of mouth/teeth, toileting, grooming, dressing, etc )  - Assess/evaluate cause of self-care deficits   - Assess range of motion  - Assess patient's mobility; develop plan if impaired  - Assess patient's need for assistive devices and provide as appropriate  - Encourage maximum independence but intervene and supervise when necessary  - Involve family in performance of ADLs  - Assess for home care needs following discharge   - Consider OT consult to assist with ADL evaluation and planning for discharge  - Provide patient education as appropriate  5/31/2023 1359 by Jerry Krishnan RN  Outcome: Completed  5/31/2023 0717 by Jerry Krishnan RN  Outcome: Progressing  Goal: Maintains/Returns to pre admission functional level  Description: INTERVENTIONS:  - Perform BMAT or MOVE assessment daily    - Set and communicate daily mobility goal to care team and patient/family/caregiver  - Collaborate with rehabilitation services on mobility goals if consulted  - Perform Range of Motion 2 times a day  - Reposition patient every 2 hours    - Dangle patient 3 times a day  - Stand patient 3 times a day  - Ambulate patient 3 times a day  - Out of bed to chair 3 times a day   - Out of bed for meals 3 times a day  - Out of bed for toileting  - Record patient progress and toleration of activity level   5/31/2023 1359 by Jerry Krishnan RN  Outcome: Completed  5/31/2023 0717 by Jerry Krishnan RN  Outcome: Progressing     Problem: DISCHARGE PLANNING  Goal: Discharge to home or other facility with appropriate resources  Description: INTERVENTIONS:  - Identify barriers to discharge w/patient and caregiver  - Arrange for needed discharge resources and transportation as appropriate  - Identify discharge learning needs (meds, wound care, etc )  - Arrange for interpretive services to assist at discharge as needed  - Refer to Case Management Department for coordinating discharge planning if the patient needs post-hospital services based on physician/advanced practitioner order or complex needs related to functional status, cognitive ability, or social support system  5/31/2023 1359 by Jerry Krishnan RN  Outcome: Completed  5/31/2023 0717 by Elfego Velazco ARELI White  Outcome: Progressing     Problem: Knowledge Deficit  Goal: Patient/family/caregiver demonstrates understanding of disease process, treatment plan, medications, and discharge instructions  Description: Complete learning assessment and assess knowledge base  Interventions:  - Provide teaching at level of understanding  - Provide teaching via preferred learning methods  5/31/2023 1359 by Prisca Starkey RN  Outcome: Completed  5/31/2023 0717 by Prisca Starkey RN  Outcome: Progressing     Problem: MOBILITY - ADULT  Goal: Maintain or return to baseline ADL function  Description: INTERVENTIONS:  -  Assess patient's ability to carry out ADLs; assess patient's baseline for ADL function and identify physical deficits which impact ability to perform ADLs (bathing, care of mouth/teeth, toileting, grooming, dressing, etc )  - Assess/evaluate cause of self-care deficits   - Assess range of motion  - Assess patient's mobility; develop plan if impaired  - Assess patient's need for assistive devices and provide as appropriate  - Encourage maximum independence but intervene and supervise when necessary  - Involve family in performance of ADLs  - Assess for home care needs following discharge   - Consider OT consult to assist with ADL evaluation and planning for discharge  - Provide patient education as appropriate  5/31/2023 1359 by Prisca Starkey RN  Outcome: Completed  5/31/2023 0717 by Prisca Starkey RN  Outcome: Progressing  Goal: Maintains/Returns to pre admission functional level  Description: INTERVENTIONS:  - Perform BMAT or MOVE assessment daily    - Set and communicate daily mobility goal to care team and patient/family/caregiver  - Collaborate with rehabilitation services on mobility goals if consulted  - Perform Range of Motion 3 times a day  - Reposition patient every 2 hours    - Dangle patient 3 times a day  - Stand patient 3 times a day  - Ambulate patient 3 times a day  - Out of bed to chair 3 times a day   - Out of bed for meals 3 times a day  - Out of bed for toileting  - Record patient progress and toleration of activity level   5/31/2023 1359 by Chantal Gonzalez RN  Outcome: Completed  5/31/2023 0717 by Chantal Gonzalez RN  Outcome: Progressing     Problem: Prexisting or High Potential for Compromised Skin Integrity  Goal: Skin integrity is maintained or improved  Description: INTERVENTIONS:  - Identify patients at risk for skin breakdown  - Assess and monitor skin integrity  - Assess and monitor nutrition and hydration status  - Monitor labs   - Assess for incontinence   - Turn and reposition patient  - Assist with mobility/ambulation  - Relieve pressure over bony prominences  - Avoid friction and shearing  - Provide appropriate hygiene as needed including keeping skin clean and dry  - Evaluate need for skin moisturizer/barrier cream  - Collaborate with interdisciplinary team   - Patient/family teaching  - Consider wound care consult   5/31/2023 1359 by Chantal Gonzalez RN  Outcome: Completed  5/31/2023 0717 by Chantal Gonzalez RN  Outcome: Progressing

## 2023-05-31 NOTE — DISCHARGE SUMMARY
Discharge Summary - [facility]    Patient Information: Juan Marquez 78 y o  female MRN: [id number]  Unit/Bed#: [facility] 604-01 Encounter: [id number]    Discharging Physician / Practitioner: Olden Alpers, CRNP  PCP: Tiara Andersen MD  Admission Date: 5/24/2023  Discharge Date: 05/31/23    Reason for Admission: cervical pain, bilateral lower extremity edema with bullae    Discharge Diagnoses:     Principal Problem:    Cervical pain  Active Problems:    Lower extremity edema with bullae    Anemia    Acid reflux    Chronic kidney disease stage III with PHYLLIS    Coronary artery disease without angina pectoris    Elevated d-dimer    Type 2 diabetes mellitus with renal complication ([address] )  Resolved Problems:    Hypoxia    Constipation    Tachycardia    Hyponatremia      Consultations During Hospital Stay:  · Neurosurgery  · APS  · Podiatry  · Dermatology  · PT/OT  · Case management     Procedures Performed:     · None     Significant Findings / Test Results:     · PE study No evidence of main pulmonary artery embolus  Distal order branches not diagnostically evaluated due to significant respiratory motion artifact  No acute cardiopulmonary process  · LE Venous duplex negative DVT  · Xray c spine Limited study  No malalignment  Hardware grossly intact  Posterior upper cervical soft tissue gas presumably from the recent surgery  If there is concern for acute complications related to the surgery, contrast-enhanced CT scanning may be helpful  · Echo with EF 60%  Mild aortic stenosis  Mild regurgitation of tricuspid valve      Incidental Findings:   · none     Test Results Pending at Discharge (will require follow up):   · none     Outpatient Tests Requested:  · Outpatient f/u with PCP  · Outpatient f/u with Dr Donald Mejia at [facility]  · Outpatient f/u wound care    Complications:  None     Hospital Course:     Juan Marquez is a 78 y o  female patient with a past medical history of obesity, GERD, CAD, type 2 diabetes, recent PC DF C3/T1 at [facility] 5/18 who originally presented to the hospital on 5/24/2023 due to cervical pain and leg swelling  Patient was seen by neurosurgery, x-rays show acceptable alignment and hardware placement  No further recommendations  Was seen by acute pain service, oxycodone was rotated to Dilaudid, gabapentin and Robaxin were added as well as scheduled Tylenol with significant improvement in neck pain and spasms  Patient also noted to have significant lower extremity edema with bullae, likely in setting of recent surgical procedure and sitting with legs in dependent position for several days at home secondary to pain  Also noted to have albumin of 2 4  Cardiogram was without significant findings, BNP normal   Lower extremity venous duplex negative  Wound care as well as podiatry and dermatology were consulted  Could consider skin biopsy as outpatient if things do not improve however swelling has improved with several doses of IV albumin and leg elevation  Plan to continue local wound care with VNA, can follow-up in wound care center  Already has dermatology follow-up if needed if things do not continue to improve  Stable for discharge home today  PT/OT initially recommended rehab however patient seems to doing better with pain control and improvement in lower extremity edema  Plan for home with home health care for PT/OT and visiting nurses for wound care  Condition at Discharge: stable     Discharge Day Visit / Exam:     Subjective: Patient offers no acute complaints  Pain is controlled  Lower extremity edema seems to be improving  Pain in lower extremities improving as well  Agreeable for discharge home today  Verbalized understanding of discharge instructions including need for follow-up  Jayna importance of continuing strict leg elevation at home    Vitals: Blood Pressure: 124/71 (05/31/23 0712)  Pulse: 70 (05/31/23 0712)  Temperature: (!) 97 1 °F (36 2 °C) (05/31/23 "32 82 12)  Temp Source: Oral (05/29/23 2208)  Respirations: 18 (05/31/23 0712)  Height: 5' 4\" (162 6 cm) (05/26/23 0900)  Weight - Scale: 120 kg (265 lb) (05/30/23 0600)  SpO2: 97 % (05/31/23 8056)     Exam:   Physical Exam  Vitals and nursing note reviewed  Constitutional:       General: She is not in acute distress  Appearance: She is obese  Neck:      Comments: Cervical collar in place   Cardiovascular:      Rate and Rhythm: Normal rate  Pulmonary:      Effort: No respiratory distress  Musculoskeletal:         General: Swelling (bilateral lower extremities but improving) present  Skin:     General: Skin is warm  Coloration: Skin is pale  Neurological:      Mental Status: She is alert  Mental status is at baseline  Psychiatric:         Mood and Affect: Mood normal          Discussion with Family: Patient  Refused family update  Discharge instructions/Information to patient and family:   See after visit summary for information provided to patient and family  Provisions for Follow-Up Care:  See after visit summary for information related to follow-up care and any pertinent home health orders  Disposition:     Home with [facility]NA Services (Reminder: Complete face to face encounter)    For Discharges to [facility] SNF:   · Not Applicable to this Patient - Not Applicable to this Patient    Planned Readmission: no     Discharge Statement:  I spent 40 minutes discharging the patient  This time was spent on the day of discharge  I had direct contact with the patient on the day of discharge  Greater than 50% of the total time was spent examining patient, answering all patient questions, arranging and discussing plan of care with patient as well as directly providing post-discharge instructions  Additional time then spent on discharge activities  Discharge Medications:  See after visit summary for reconciled discharge medications provided to patient and family        ** Please Note: " This note has been constructed using a voice recognition system **

## 2023-05-31 NOTE — DISCHARGE INSTR - AVS FIRST PAGE
Discharge Instructions - Podiatry    Weight Bearing Status: Weight bearing as tolerated                       Pain: Continue analgesics as directed    Follow-up appointment instructions: Please make an appointment within one week of discharge with [address] [place] [address]  Contact sooner if any increase in pain, or signs of infection occur    Nursing Wound Care Instructions: Remove dressings and wash legs with soap and water  Apply maxorb, 4x4, ABD and kerlix to the bilateral legs   Change on a once daily basis or as needed

## 2023-06-07 ENCOUNTER — OFFICE VISIT (OUTPATIENT)
Dept: WOUND CARE | Facility: HOSPITAL | Age: 68
End: 2023-06-07
Payer: MEDICARE

## 2023-06-07 VITALS
RESPIRATION RATE: 18 BRPM | DIASTOLIC BLOOD PRESSURE: 90 MMHG | HEIGHT: 64 IN | HEART RATE: 88 BPM | TEMPERATURE: 96.7 F | WEIGHT: 244.71 LBS | BODY MASS INDEX: 41.78 KG/M2 | SYSTOLIC BLOOD PRESSURE: 139 MMHG

## 2023-06-07 DIAGNOSIS — L97.111: ICD-10-CM

## 2023-06-07 DIAGNOSIS — E11.22 TYPE 2 DIABETES MELLITUS WITH STAGE 3 CHRONIC KIDNEY DISEASE, UNSPECIFIED WHETHER LONG TERM INSULIN USE, UNSPECIFIED WHETHER STAGE 3A OR 3B CKD (HCC): ICD-10-CM

## 2023-06-07 DIAGNOSIS — L97.329 VENOUS ULCER OF ANKLE, LEFT (HCC): Primary | ICD-10-CM

## 2023-06-07 DIAGNOSIS — I83.011: ICD-10-CM

## 2023-06-07 DIAGNOSIS — I87.311 CHRONIC VENOUS HYPERTENSION (IDIOPATHIC) WITH ULCER OF RIGHT LOWER EXTREMITY (CODE) (HCC): ICD-10-CM

## 2023-06-07 DIAGNOSIS — I87.312 CHRONIC VENOUS HYPERTENSION (IDIOPATHIC) WITH ULCER OF LEFT LOWER EXTREMITY (CODE) (HCC): ICD-10-CM

## 2023-06-07 DIAGNOSIS — I83.023 VENOUS ULCER OF ANKLE, LEFT (HCC): Primary | ICD-10-CM

## 2023-06-07 DIAGNOSIS — N18.30 TYPE 2 DIABETES MELLITUS WITH STAGE 3 CHRONIC KIDNEY DISEASE, UNSPECIFIED WHETHER LONG TERM INSULIN USE, UNSPECIFIED WHETHER STAGE 3A OR 3B CKD (HCC): ICD-10-CM

## 2023-06-07 PROCEDURE — 99213 OFFICE O/P EST LOW 20 MIN: CPT | Performed by: STUDENT IN AN ORGANIZED HEALTH CARE EDUCATION/TRAINING PROGRAM

## 2023-06-07 PROCEDURE — 97597 DBRDMT OPN WND 1ST 20 CM/<: CPT | Performed by: STUDENT IN AN ORGANIZED HEALTH CARE EDUCATION/TRAINING PROGRAM

## 2023-06-07 RX ORDER — LIDOCAINE HYDROCHLORIDE 40 MG/ML
5 SOLUTION TOPICAL ONCE
Status: COMPLETED | OUTPATIENT
Start: 2023-06-07 | End: 2023-06-07

## 2023-06-07 RX ADMIN — LIDOCAINE HYDROCHLORIDE 5 ML: 40 SOLUTION TOPICAL at 09:22

## 2023-06-07 NOTE — PATIENT INSTRUCTIONS
Orders Placed This Encounter   Procedures    Wound home care     Continue [facility] for wound care     Standing Status:   Future     Standing Expiration Date:   6/7/2024    Wound miscellaneous orders     To promote wound healing:   Increase protein intake to 3-4 servings per day  Maintain stable blood sugars     Standing Status:   Future     Standing Expiration Date:   6/7/2024    Wound cleansing and dressings     Wound location Left Ankle wound and Right thigh wound   Change dressing 3 times weekly   You may remove the dressing and shower  Do not leave wound open to air, apply new dressing immediately  Cleanse the wound with mild soap and water, rinse well, pat dry  (Do not submerge wounds)  Apply non-scented moisturizer to wound bed  Apply hydrafera ready cut to fit to the wound  Cover with gauze  Secure with rolled gauze and tape  This was applied today at the [facility]       Standing Status:   Future     Standing Expiration Date:   6/7/2024

## 2023-06-07 NOTE — LETTER
1 Don Crump yoselin [address] [place], [place] [address]  Phone#  [phone]  Fax#  [phone]    Patient:  Ochoa Gonzales  YOB: 1955  Phone:  [phone]  Date of Visit:  6/7/2023    Orders Placed This Encounter   Procedures   • Wound home care     Continue Stacytessa Iverson for wound care     Standing Status:   Future     Standing Expiration Date:   6/7/2024   • Wound miscellaneous orders     To promote wound healing:   Increase protein intake to 3-4 servings per day  Maintain stable blood sugars     Standing Status:   Future     Standing Expiration Date:   6/7/2024   • Wound cleansing and dressings     Wound location Left Ankle wound and Right thigh wound   Change dressing 3 times weekly   You may remove the dressing and shower  Do not leave wound open to air, apply new dressing immediately  Cleanse the wound with mild soap and water, rinse well, pat dry  (Do not submerge wounds)  Apply non-scented moisturizer to wound bed  Apply hydrafera blue ready cut to fit to the wound  Cover with gauze  Secure with rolled gauze and tape  This was [facility]  Standing Status:   Future     Standing Expiration Date:   6/7/2024   • Wound compression and edema control     Elastic Tubular Stocking: Spanda- size F    Tubular elastic bandage: Apply from base of toes to behind the knee  Apply in AM, may remove for sleep  Avoid prolonged standing in one place  Elevate leg(s) above the level of the heart when sitting or as much as possible       Standing Status:   Future     Standing Expiration Date:   6/7/2024         Electronically signed by Matthias Dozier MD Subjective:      Patient ID: Mehdi Rich is a 16 y.o. female. HPI Patient is here today for a check up. No concerns. 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 No concerns with school performance. She gets good grades. Good behavior at school and pretty good at home. She sleeps well. She has a good, healthy appetite. She has no bowel/bladder issues. She has had regular periods for a few years. No concerns with substance use/abuse. She is not sexually active. Interval concerns  ADD/ADHD: No  Behavior: No  Puberty: No  Weight: No  School:No  Other: No    School  Interacts well with peers:Yes  Participates in extracurricular activities:Yes  School performance good   Bullying: No  Attendance: good     Nutrition/Exercise  Nutrition: eats a balanced diet  Soda intake: no  Exercise: Yes    Dental Exam UTD: Yes  Eye Exam UTD: not applicable    Menses  Have you ever had a menstrual period? yes  How old were you when you had your first menstrual period? 7years old  How many periods have you had in the last year? 11  Are you able to maintain a normal schedule with your menses? Yes      Sports History  Previous Injury:No  Hx of concussion:No  Prior Restrictions on play:No  Short of breath with activity: No  Syncope/Presyncope:No  Palpatitions: No  Chest Pain:No  Previous Cardiac Workup:No  Family Hx of Early Cardiac Death:No  Family Hx Cardiac Defects:No      Objective:        Vitals:    03/10/20 0729   BP: 96/62   Site: Left Upper Arm   Position: Sitting   Cuff Size: Medium Adult   Pulse: 83   Temp: 99.6 °F (37.6 °C)   TempSrc: Temporal   SpO2: 98%   Weight: 122 lb 3.2 oz (55.4 kg)   Height: 5' 3.5\" (1.613 m)     Body mass index is 21.31 kg/m². Growth parameters are noted and are appropriate for age.   Vision screening done? no    General:   alert and appears stated age   Gait:   normal   Skin:   normal   Oral cavity:   lips, mucosa, and tongue normal; teeth and gums normal   Eyes:   sclerae white, pupils equal and reactive, red reflex normal bilaterally   Ears:   normal bilaterally   Neck:   no adenopathy, supple, symmetrical, trachea midline and thyroid not enlarged, symmetric, no tenderness/mass/nodules   Lungs:  clear to auscultation bilaterally   Heart:   regular rate and rhythm, S1, S2 normal, no murmur, click, rub or gallop   Abdomen:  soft, non-tender; bowel sounds normal; no masses,  no organomegaly   :  not examined       Neuro:  normal without focal findings, mental status, speech normal, alert and oriented x3, ARMINDA and reflexes normal and symmetric        Spine range of motion normal. Muscular strength intact. , Range of motion normal in hips, knees, shoulders, and spine., No joint swelling, deformity, or tenderness. ASSESSMENT AND PLAN  There are no diagnoses linked to this encounter.          -Reviewed appropriate topics from following:importance of regular dental care, importance of varied diet, minimize junk food, importance of regular exercise, the process of puberty, sex; STD & pregnancy prevention, drugs, ETOH, and tobacco, limiting TV, media violence, seat belts, bicycle helmets, sunscreen/tanning, driving/texting. Discussed with patient's father who verbalized understanding of safety issues.    -Cleared for sports : NA      Review of Systems   Constitutional: Negative for appetite change, fatigue and unexpected weight change. HENT: Negative for congestion, dental problem, ear pain, hearing loss, sore throat, trouble swallowing and voice change. Eyes: Negative for pain and visual disturbance. Respiratory: Negative for cough, chest tightness and shortness of breath. Cardiovascular: Negative for chest pain and palpitations. Gastrointestinal: Negative for abdominal pain, constipation and diarrhea. Genitourinary: Negative for difficulty urinating. Musculoskeletal: Negative for arthralgias, back pain, myalgias and neck pain. Skin: Negative for rash.    Neurological:

## 2023-06-07 NOTE — PROGRESS NOTES
Patient ID: Nicola Keith is a 78 y o  female Date of Birth 1955     Chief Complaint  No chief complaint on file  Allergies  Latex    Assessment:     Diagnoses and all orders for this visit:    Venous ulcer of ankle, left ([facility])  -     lidocaine (XYLOCAINE) 4 % topical solution 5 mL  -     Wound home care; Future  -     Wound miscellaneous orders; Future  -     Cancel: Wound cleansing and dressings; Future  -     Wound cleansing and dressings; Future  -     Wound compression and edema control; Future  -     Debridement    Venous stasis ulcer of right thigh limited to breakdown of skin, unspecified whether varicose veins present ([facility] )  -     Wound home care; Future  -     Wound miscellaneous orders; Future  -     Cancel: Wound cleansing and dressings; Future  -     Wound cleansing and dressings; Future  -     Wound compression and edema control; Future  -     Debridement    Chronic venous hypertension (idiopathic) with ulcer of right lower extremity (CODE) ([facility])    Chronic venous hypertension (idiopathic) with ulcer of left lower extremity (CODE) ([facility])    Type 2 diabetes mellitus with stage 3 chronic kidney disease, unspecified whether long term insulin use, unspecified whether stage 3a or 3b CKD ([facility] )              Debridement   Wound 05/25/23 Ankle Anterior; Left    Universal Protocol:  Consent: Verbal consent obtained    Risks and benefits: risks, benefits and alternatives were discussed  Consent given by: patient  Patient identity confirmed: verbally with patient      Performed by: physician  Debridement type: selective  Pain control: lidocaine 4%  Pre-debridement measurements  Length (cm): 1 4  Width (cm): 1 6  Depth (cm): 0 1  Surface Area (cm^2): 2 24  Volume (cm^3): 0 22    Post-debridement measurements  Length (cm): 1 4  Width (cm): 1 6  Depth (cm): 0 1  Percent debrided: 90%  Surface Area (cm^2): 2 24  Area debrided (cm^2): 2 02  Volume (cm^3): 0 22  Devitalized tissue debrided: biofilm and exudate  Instrument(s) utilized: curette  Bleeding: small  Hemostasis obtained with: pressure  Procedural pain (0-10): 1  Post-procedural pain: 0   Response to treatment: procedure was tolerated well    Debridement   Wound 05/26/23 Thigh Posterior;Right    Universal Protocol:  Consent: Verbal consent obtained  Risks and benefits: risks, benefits and alternatives were discussed  Consent given by: patient  Patient identity confirmed: verbally with patient      Performed by: physician  Debridement type: selective  Pain control: lidocaine 4%  Pre-debridement measurements  Length (cm): 2  Width (cm): 0 3  Depth (cm): 0 1  Surface Area (cm^2): 0 6  Volume (cm^3): 0 06    Post-debridement measurements  Length (cm): 2  Width (cm): 0 3  Depth (cm): 0 1  Percent debrided: 90%  Surface Area (cm^2): 0 6  Area debrided (cm^2): 0 54  Volume (cm^3): 0 06  Devitalized tissue debrided: biofilm and exudate  Instrument(s) utilized: curette  Bleeding: small  Hemostasis obtained with: pressure  Procedural pain (0-10): 1  Post-procedural pain: 0   Response to treatment: procedure was tolerated well          Plan:  •  It was a pleasure to see Sobeida He today for initial care of her L ankle and R posterior thigh venous wounds today  • Selective debridement performed today as above  • Start plan of care as noted below with hydrofera ready and kamron  • VNA 3x week  •  A1C results reviewed with the patient today  • No signs or symptoms of infection today  Patient understands that if any signs of infection start (such as increased redness, drainage, pain, fever, chills, diaphoresis), they should call our office or proceed to the ER or Urgent Care  • Patient should continue a high protein diet to facilitate wound healing  • Patient is advised to not submerge wound or leave wound open to air    • Follow up in 1 weeks  • Given the multi-factorial nature of wound care, additional time was taken to review patient's treatment plan with other specialties and most recent pertinent lab work and imaging  • All plans of care discussed with patient at bedside who verbalized understanding with treatment plan  Wound 05/25/23 Ankle Anterior; Left (Active)   Wound Image Images linked (Simultaneous filing  User may not have seen previous data ) 06/07/23 0910   Wound Description Pink;Yellow;Slough; Epithelialization 06/07/23 0910   Cecile-wound Assessment Pink;Dry;Scaly;Edema 06/07/23 0910   Wound Length (cm) 1 4 cm 06/07/23 0910   Wound Width (cm) 1 6 cm 06/07/23 0910   Wound Depth (cm) 0 1 cm 06/07/23 0910   Wound Surface Area (cm^2) 2 24 cm^2 06/07/23 0910   Wound Volume (cm^3) 0 224 cm^3 06/07/23 0910   Calculated Wound Volume (cm^3) 0 22 cm^3 06/07/23 0910   Drainage Amount Moderate 06/07/23 0910   Drainage Description Serosanguineous 06/07/23 0910   Non-staged Wound Description Full thickness 06/07/23 0910   Dressing Status Intact 06/07/23 0910       Wound 05/26/23 Thigh Posterior;Right (Active)   Wound Image Images linked 06/07/23 0908   Wound Description Epithelialization;Pink; White;Slough 06/07/23 0907   Cecile-wound Assessment Pink 06/07/23 0907   Wound Length (cm) 2 cm 06/07/23 0907   Wound Width (cm) 0 3 cm 06/07/23 0907   Wound Depth (cm) 0 1 cm 06/07/23 0907   Wound Surface Area (cm^2) 0 6 cm^2 06/07/23 0907   Wound Volume (cm^3) 0 06 cm^3 06/07/23 0907   Calculated Wound Volume (cm^3) 0 06 cm^3 06/07/23 0907   Drainage Amount Small 06/07/23 0907   Drainage Description Serous 06/07/23 0907   Non-staged Wound Description Full thickness 06/07/23 0907   Dressing Status Intact 06/07/23 0907       Wound 05/25/23 Ankle Anterior; Left (Active)   Date First Assessed/Time First Assessed: 05/25/23 0615   Location: Ankle  Wound Location Orientation: Anterior; Left       Wound 05/25/23 Incision Neck Posterior (Active)   Date First Assessed/Time First Assessed: 05/25/23 1228   Primary Wound Type:  Incision  Location: Neck  Wound Location Orientation: Posterior  Wound Description (Comments): preadmission surgical incision       Wound 05/26/23 Thigh Posterior;Right (Active)   Date First Assessed/Time First Assessed: 05/26/23 1400   Present on Original Admission: Yes  Location: Thigh  Wound Location Orientation: Posterior;Right       [REMOVED] Wound Foot Anterior; Left (Removed)   Resolved Date: 06/07/23  No Date First Assessed or Time First Assessed found  Location: Foot  Wound Location Orientation: Anterior; Left  Wound Outcome: (c) Healed       [REMOVED] Wound 05/25/23 Foot Anterior; Left (Removed)   Resolved Date: 06/07/23  Date First Assessed/Time First Assessed: 05/25/23 0616   Location: Foot  Wound Location Orientation: Anterior; Left  Wound Outcome: (c) Healed       [REMOVED] Wound 05/25/23 Pretibial Right (Removed)   Resolved Date: 06/07/23  Date First Assessed/Time First Assessed: 05/25/23 0617   Location: Pretibial  Wound Location Orientation: Right  Wound Outcome: (c) Healed       [REMOVED] Wound 05/25/23 Foot Anterior;Right (Removed)   Resolved Date: 06/07/23  Date First Assessed/Time First Assessed: 05/25/23 0617   Location: Foot  Wound Location Orientation: Anterior;Right  Wound Outcome: (c) Healed       [REMOVED] Wound 05/25/23 Ankle Anterior;Right (Removed)   Resolved Date: 06/07/23  Date First Assessed/Time First Assessed: 05/25/23 0618   Location: Ankle  Wound Location Orientation: Anterior;Right  Wound Outcome: Healed       Subjective:        Consult (6/7/23): Ivette Go is a pleasant 79 yo F w a pmhx of controlled DM who presents today for wound of her BL LEs  Patient underwent multi-level cervical laminectomy and fusion on 5/18/23  Following this she continued to have pain and remained immobilized in her chair at home until 5/24 when she called the ED and was admitted to [facility] for PHYLLIS  At that time she was found to multiple bullae and some open wounds of her her BL LEs, as well as increased edema   She was treated with IV abx during her hospitalization and discharged "with fu instructions to fu up with wound management  The following portions of the patient's history were reviewed and updated as appropriate: allergies, current medications, past family history, past medical history, past social history, past surgical history and problem list     Review of Systems   Skin: Positive for wound  All other systems reviewed and are negative  Objective:       Wound 05/25/23 Ankle Anterior; Left (Active)   Wound Image Images linked (Simultaneous filing  User may not have seen previous data ) 06/07/23 0910   Wound Description Pink;Yellow;Slough; Epithelialization 06/07/23 0910   Cecile-wound Assessment Pink;Dry;Scaly;Edema 06/07/23 0910   Wound Length (cm) 1 4 cm 06/07/23 0910   Wound Width (cm) 1 6 cm 06/07/23 0910   Wound Depth (cm) 0 1 cm 06/07/23 0910   Wound Surface Area (cm^2) 2 24 cm^2 06/07/23 0910   Wound Volume (cm^3) 0 224 cm^3 06/07/23 0910   Calculated Wound Volume (cm^3) 0 22 cm^3 06/07/23 0910   Drainage Amount Moderate 06/07/23 0910   Drainage Description Serosanguineous 06/07/23 0910   Non-staged Wound Description Full thickness 06/07/23 0910   Dressing Status Intact 06/07/23 0910       Wound 05/26/23 Thigh Posterior;Right (Active)   Wound Image Images linked 06/07/23 0908   Wound Description Epithelialization;Pink; White;Slough 06/07/23 0907   Cecile-wound Assessment Pink 06/07/23 0907   Wound Length (cm) 2 cm 06/07/23 0907   Wound Width (cm) 0 3 cm 06/07/23 0907   Wound Depth (cm) 0 1 cm 06/07/23 0907   Wound Surface Area (cm^2) 0 6 cm^2 06/07/23 0907   Wound Volume (cm^3) 0 06 cm^3 06/07/23 0907   Calculated Wound Volume (cm^3) 0 06 cm^3 06/07/23 0907   Drainage Amount Small 06/07/23 0907   Drainage Description Serous 06/07/23 0907   Non-staged Wound Description Full thickness 06/07/23 0907   Dressing Status Intact 06/07/23 0907       /90   Pulse 88   Temp (!) 96 7 °F (35 9 °C)   Resp 18   Ht 5' 4\" (1 626 m)   Wt 111 kg (244 lb 11 4 oz)   BMI 42 00 " kg/m²     Physical Exam  Vitals reviewed  Constitutional:       Appearance: Normal appearance  She is obese  HENT:      Head: Normocephalic and atraumatic  Mouth/Throat:      Mouth: Mucous membranes are moist    Eyes:      Extraocular Movements: Extraocular movements intact  Pulmonary:      Effort: Pulmonary effort is normal    Musculoskeletal:      Right lower leg: Edema present  Left lower leg: Edema present  Skin:     Comments: Wound of L ankle and R posterior thigh   Neurological:      Mental Status: She is alert  Psychiatric:         Mood and Affect: Mood normal          Behavior: Behavior normal            Wound Instructions:  Orders Placed This Encounter   Procedures   • Wound home care     Continue Adelfo Box for wound care     Standing Status:   Future     Standing Expiration Date:   6/7/2024   • Wound miscellaneous orders     To promote wound healing:   Increase protein intake to 3-4 servings per day  Maintain stable blood sugars     Standing Status:   Future     Standing Expiration Date:   6/7/2024   • Wound cleansing and dressings     Wound location Left Ankle wound and Right thigh wound   Change dressing 3 times weekly   You may remove the dressing and shower  Do not leave wound open to air, apply new dressing immediately  Cleanse the wound with mild soap and water, rinse well, pat dry  (Do not submerge wounds)  Apply non-scented moisturizer to wound bed  Apply hydrafera blue ready cut to fit to the wound  Cover with gauze  Secure with rolled gauze and tape  This was applied [facility] at the [facility]  Standing Status:   Future     Standing Expiration Date:   6/7/2024   • Wound compression and edema control     Elastic Tubular Stocking: Spanda- size F    Tubular elastic bandage: Apply from base of toes to behind the knee  Apply in AM, may remove for sleep  Avoid prolonged standing in one place  Elevate leg(s) above the level of the heart when sitting or as much as possible  Standing Status:   Future     Standing Expiration Date:   6/7/2024   • Debridement     This order was created via procedure documentation   • Debridement     This order was created via procedure documentation        Diagnosis ICD-10-CM Associated Orders   1  Venous ulcer of ankle, left ([facility])  I83 023 lidocaine (XYLOCAINE) 4 % topical solution 5 mL    L97 329 Wound home care     Wound miscellaneous orders     Wound cleansing and dressings     Wound compression and edema control     Debridement      2  Venous stasis ulcer of right thigh limited to breakdown of skin, unspecified whether varicose veins present ([facility])  I83 011 Wound home care    L97 111 Wound miscellaneous orders     Wound cleansing and dressings     Wound compression and edema control     Debridement      3  Chronic venous hypertension (idiopathic) with ulcer of right lower extremity (CODE) ([facility])  I87 311       4  Chronic venous hypertension (idiopathic) with ulcer of left lower extremity (CODE) ([facility])  I87 312       5   Type 2 diabetes mellitus with stage 3 chronic kidney disease, unspecified whether long term insulin use, unspecified whether stage 3a or 3b CKD ([facility] )  E11 22     N18 30

## 2023-06-07 NOTE — LETTER
[address]ump [address]  [place] [address]  Phone#  [phone]  Fax#  [phone]    Patient:  Mario Martines  YOB: 1955  Phone:  [phone]  Date of Visit:  6/7/2023    Orders Placed This Encounter   Procedures   • Wound home care     Continue Piedad Harder for wound care     Standing Status:   Future     Standing Expiration Date:   6/7/2024   • Wound miscellaneous orders     To promote wound healing:   Increase protein intake to 3-4 servings per day  Maintain stable blood sugars     Standing Status:   Future     Standing Expiration Date:   6/7/2024   • Wound cleansing and dressings     Wound location Left Ankle wound and Right thigh wound   Change dressing 3 times weekly   You may remove the dressing and shower  Do not leave wound open to air, apply new dressing immediately  Cleanse the wound with mild soap and water, rinse well, pat dry  (Do not submerge wounds)  Apply non-scented moisturizer to wound bed  Apply hydrafera blue ready cut to fit to the wound  Cover with gauze  Secure with rolled gauze and tape  This was applied [facility] at the [facility]  Standing Status:   Future     Standing Expiration Date:   6/7/2024   • Wound compression and edema control     Elastic Tubular Stocking: Spanda- size F    Tubular elastic bandage: Apply from base of toes to behind the knee  Apply in AM, may remove for sleep  Avoid prolonged standing in one place  Elevate leg(s) above the level of the heart when sitting or as much as possible       Standing Status:   Future     Standing Expiration Date:   6/7/2024         Electronically signed by Erna Murphy MD

## 2023-06-08 ENCOUNTER — TELEPHONE (OUTPATIENT)
Dept: DERMATOLOGY | Facility: CLINIC | Age: 68
End: 2023-06-08

## 2023-06-08 NOTE — TELEPHONE ENCOUNTER
Pt was scheduled for a HFU for 6/13 but cancelled and when I called to see if pt wanted to reschedule pt said she does not want to reschedule   Thank you

## 2023-06-15 ENCOUNTER — OFFICE VISIT (OUTPATIENT)
Dept: WOUND CARE | Facility: HOSPITAL | Age: 68
End: 2023-06-15
Payer: MEDICARE

## 2023-06-15 VITALS — HEART RATE: 80 BPM | SYSTOLIC BLOOD PRESSURE: 122 MMHG | DIASTOLIC BLOOD PRESSURE: 78 MMHG | TEMPERATURE: 97.1 F

## 2023-06-15 DIAGNOSIS — L97.329 VENOUS ULCER OF ANKLE, LEFT (HCC): Primary | ICD-10-CM

## 2023-06-15 DIAGNOSIS — I83.023 VENOUS ULCER OF ANKLE, LEFT (HCC): Primary | ICD-10-CM

## 2023-06-15 DIAGNOSIS — I87.311 CHRONIC VENOUS HYPERTENSION (IDIOPATHIC) WITH ULCER OF RIGHT LOWER EXTREMITY (CODE) (HCC): ICD-10-CM

## 2023-06-15 DIAGNOSIS — N18.30 TYPE 2 DIABETES MELLITUS WITH STAGE 3 CHRONIC KIDNEY DISEASE, UNSPECIFIED WHETHER LONG TERM INSULIN USE, UNSPECIFIED WHETHER STAGE 3A OR 3B CKD (HCC): ICD-10-CM

## 2023-06-15 DIAGNOSIS — E11.22 TYPE 2 DIABETES MELLITUS WITH STAGE 3 CHRONIC KIDNEY DISEASE, UNSPECIFIED WHETHER LONG TERM INSULIN USE, UNSPECIFIED WHETHER STAGE 3A OR 3B CKD (HCC): ICD-10-CM

## 2023-06-15 PROCEDURE — 99213 OFFICE O/P EST LOW 20 MIN: CPT | Performed by: STUDENT IN AN ORGANIZED HEALTH CARE EDUCATION/TRAINING PROGRAM

## 2023-06-15 RX ORDER — LIDOCAINE HYDROCHLORIDE 40 MG/ML
5 SOLUTION TOPICAL ONCE
Status: COMPLETED | OUTPATIENT
Start: 2023-06-15 | End: 2023-06-15

## 2023-06-15 RX ADMIN — LIDOCAINE HYDROCHLORIDE 5 ML: 40 SOLUTION TOPICAL at 08:45

## 2023-06-15 NOTE — PROGRESS NOTES
Patient ID: Autumn Sands is a 78 y o  female Date of Birth 1955     Chief Complaint  Chief Complaint   Patient presents with   • Follow Up Wound Care Visit     Open wound LLE       Allergies  Latex    Assessment:     Diagnoses and all orders for this visit:    Venous ulcer of ankle, left (HCC)  -     lidocaine (XYLOCAINE) 4 % topical solution 5 mL  -     Cancel: Wound cleansing and dressings; Future  -     Wound home care; Future  -     Wound miscellaneous orders; Future  -     Wound compression and edema control; Future  -     Wound cleansing and dressings; Future    Chronic venous hypertension (idiopathic) with ulcer of right lower extremity (CODE) ([facility])  -     lidocaine (XYLOCAINE) 4 % topical solution 5 mL  -     Cancel: Wound cleansing and dressings; Future  -     Wound home care; Future  -     Wound miscellaneous orders; Future  -     Wound compression and edema control; Future  -     Wound cleansing and dressings; Future    Type 2 diabetes mellitus with stage 3 chronic kidney disease, unspecified whether long term insulin use, unspecified whether stage 3a or 3b CKD ([facility])  -     lidocaine (XYLOCAINE) 4 % topical solution 5 mL  -     Cancel: Wound cleansing and dressings; Future  -     Wound home care; Future  -     Wound miscellaneous orders; Future  -     Wound compression and edema control; Future  -     Wound cleansing and dressings; Future              Plan:  • It was a pleasure to see Adina Johnsonr today for follow up care of her wound today  • Wound is healed today  Protect the skin with moisturizer, Continue compression and leg elevation for edema control  • Follow up with wound management as needed in the future  • All plans of care discussed with patient at bedside who verbalized understanding with treatment plan         Wound 05/25/23 Ankle Left;Lateral (Active)   Wound Image Images linked 06/15/23 0836   Wound Description Pink;Epithelialization 06/15/23 0836   Cecile-wound Assessment Dry;Scaly;Edema 06/15/23 8641   Wound Length (cm) 0 1 cm 06/15/23 0836   Wound Width (cm) 0 1 cm 06/15/23 0836   Wound Depth (cm) 0 1 cm 06/15/23 0836   Wound Surface Area (cm^2) 0 01 cm^2 06/15/23 0836   Wound Volume (cm^3) 0 001 cm^3 06/15/23 0836   Calculated Wound Volume (cm^3) 0 cm^3 06/15/23 0836   Change in Wound Size % 100 06/15/23 0836   Drainage Amount Scant 06/15/23 0836   Drainage Description Serous;Clear 06/15/23 0836   Non-staged Wound Description Full thickness 06/15/23 0836   Dressing Status Intact; Old drainage (upon arrival) 06/15/23 0836       Wound 05/26/23 Thigh Posterior;Right (Active)   Wound Image Images linked 06/15/23 0831   Wound Description Epithelialization;Pink 06/15/23 0830   Cecile-wound Assessment Intact 06/15/23 0830   Wound Length (cm) 0 cm 06/15/23 0830   Wound Width (cm) 0 cm 06/15/23 0830   Wound Depth (cm) 0 cm 06/15/23 0830   Wound Surface Area (cm^2) 0 cm^2 06/15/23 0830   Wound Volume (cm^3) 0 cm^3 06/15/23 0830   Calculated Wound Volume (cm^3) 0 cm^3 06/15/23 0830   Change in Wound Size % 100 06/15/23 0830   Drainage Amount None 06/15/23 0830   Non-staged Wound Description Not applicable 67/84/80 4522   Dressing Status -- (no drsg upon admission) 06/15/23 0830       Wound 05/25/23 Ankle Left;Lateral (Active)   Date First Assessed/Time First Assessed: 05/25/23 0615   Location: Ankle  Wound Location Orientation: Left;Lateral       Wound 05/25/23 Incision Neck Posterior (Active)   Date First Assessed/Time First Assessed: 05/25/23 1228   Primary Wound Type: Incision  Location: Neck  Wound Location Orientation: Posterior  Wound Description (Comments): preadmission surgical incision       Wound 05/26/23 Thigh Posterior;Right (Active)   Date First Assessed/Time First Assessed: 05/26/23 1400   Present on Original Admission: Yes  Location: Thigh  Wound Location Orientation: Posterior;Right       [REMOVED] Wound Foot Anterior; Left (Removed)   Resolved Date: 06/07/23  No Date First Assessed or Time First Assessed found  Location: Foot  Wound Location Orientation: Anterior; Left  Wound Outcome: (c) Healed       [REMOVED] Wound 05/25/23 Foot Anterior; Left (Removed)   Resolved Date: 06/07/23  Date First Assessed/Time First Assessed: 05/25/23 0616   Location: Foot  Wound Location Orientation: Anterior; Left  Wound Outcome: (c) Healed       [REMOVED] Wound 05/25/23 Pretibial Right (Removed)   Resolved Date: 06/07/23  Date First Assessed/Time First Assessed: 05/25/23 0617   Location: Pretibial  Wound Location Orientation: Right  Wound Outcome: (c) Healed       [REMOVED] Wound 05/25/23 Foot Anterior;Right (Removed)   Resolved Date: 06/07/23  Date First Assessed/Time First Assessed: 05/25/23 0617   Location: Foot  Wound Location Orientation: Anterior;Right  Wound Outcome: (c) Healed       [REMOVED] Wound 05/25/23 Ankle Anterior;Right (Removed)   Resolved Date: 06/07/23  Date First Assessed/Time First Assessed: 05/25/23 0618   Location: Ankle  Wound Location Orientation: Anterior;Right  Wound Outcome: Healed       Subjective:           77 yo F here today for fu care of venous wound  The following portions of the patient's history were reviewed and updated as appropriate: allergies, current medications, past family history, past medical history, past social history, past surgical history and problem list     Review of Systems   Skin: Positive for wound  All other systems reviewed and are negative          Objective:       Wound 05/25/23 Ankle Left;Lateral (Active)   Wound Image Images linked 06/15/23 0836   Wound Description Pink;Epithelialization 06/15/23 0836   Cecile-wound Assessment Dry;Scaly;Edema 06/15/23 0836   Wound Length (cm) 0 1 cm 06/15/23 0836   Wound Width (cm) 0 1 cm 06/15/23 0836   Wound Depth (cm) 0 1 cm 06/15/23 0836   Wound Surface Area (cm^2) 0 01 cm^2 06/15/23 0836   Wound Volume (cm^3) 0 001 cm^3 06/15/23 0836   Calculated Wound Volume (cm^3) 0 cm^3 06/15/23 0836   Change in Wound Size % 100 06/15/23 8204   Drainage Amount Scant 06/15/23 0836   Drainage Description Serous;Clear 06/15/23 0836   Non-staged Wound Description Full thickness 06/15/23 0836   Dressing Status Intact; Old drainage (upon arrival) 06/15/23 0836       Wound 05/26/23 Thigh Posterior;Right (Active)   Wound Image Images linked 06/15/23 0831   Wound Description Epithelialization;Pink 06/15/23 0830   Cecile-wound Assessment Intact 06/15/23 0830   Wound Length (cm) 0 cm 06/15/23 0830   Wound Width (cm) 0 cm 06/15/23 0830   Wound Depth (cm) 0 cm 06/15/23 0830   Wound Surface Area (cm^2) 0 cm^2 06/15/23 0830   Wound Volume (cm^3) 0 cm^3 06/15/23 0830   Calculated Wound Volume (cm^3) 0 cm^3 06/15/23 0830   Change in Wound Size % 100 06/15/23 0830   Drainage Amount None 06/15/23 0830   Non-staged Wound Description Not applicable 32/09/68 7421   Dressing Status -- (no drsg upon admission) 06/15/23 0830       /78   Pulse 80   Temp (!) 97 1 °F (36 2 °C) (Temporal)     Physical Exam  Vitals reviewed  Constitutional:       Appearance: Normal appearance  She is obese  HENT:      Head: Normocephalic and atraumatic  Mouth/Throat:      Mouth: Mucous membranes are moist    Eyes:      Extraocular Movements: Extraocular movements intact  Pulmonary:      Effort: Pulmonary effort is normal    Neurological:      Mental Status: She is alert     Psychiatric:         Mood and Affect: Mood normal          Behavior: Behavior normal            Wound Instructions:  Orders Placed This Encounter   Procedures   • Wound home care     Discharge from [facility] for wound care     Standing Status:   Future     Standing Expiration Date:   6/15/2024   • Wound miscellaneous orders      To promote wound healing:   Increase protein intake to 3-4 servings per day  Maintain stable blood sugars       Standing Status:   Future     Standing Expiration Date:   6/15/2024   • Wound compression and edema control     Elastic Tubular Stocking: Spanda- size F     Tubular elastic bandage: Apply from base of toes to behind the knee  Apply in AM, may remove for sleep  You may purchase compression socks on-line and wear daily      Avoid prolonged standing in one place      Elevate leg(s) above the level of the heart when sitting or as much as possible  Standing Status:   Future     Standing Expiration Date:   6/15/2024   • Wound cleansing and dressings     Wound location:     Left Ankle wound and Right thigh wound are healed    You may shower - apply non-scented moisturizer to healed areas and surrounding skin              Standing Status:   Future     Standing Expiration Date:   6/15/2024        Diagnosis ICD-10-CM Associated Orders   1  Venous ulcer of ankle, left ([facility])  I83 023 lidocaine (XYLOCAINE) 4 % topical solution 5 mL    L97 329 Wound home care     Wound miscellaneous orders     Wound compression and edema control     Wound cleansing and dressings      2  Chronic venous hypertension (idiopathic) with ulcer of right lower extremity (CODE) ([facility])  I87 311 lidocaine (XYLOCAINE) 4 % topical solution 5 mL     Wound home care     Wound miscellaneous orders     Wound compression and edema control     Wound cleansing and dressings      3   Type 2 diabetes mellitus with stage 3 chronic kidney disease, unspecified whether long term insulin use, unspecified whether stage 3a or 3b CKD ([facility])  E11 22 lidocaine (XYLOCAINE) 4 % topical solution 5 mL    N18 30 Wound home care     Wound miscellaneous orders     Wound compression and edema control     Wound cleansing and dressings

## 2023-06-15 NOTE — LETTER
[address] [place] [id number]  Phone#  [phone]  Fax#  [phone]    Patient:  Vanessa Kendall  YOB: 1955  Phone:  [phone]  Date of Visit:  6/15/2023    Orders Placed This Encounter   Procedures   • Wound home care     Discharge from [facility] for wound care     Standing Status:   Future     Standing Expiration Date:   6/15/2024   • Wound miscellaneous orders      To promote wound healing:   Increase protein intake to 3-4 servings per day  Maintain stable blood sugars       Standing Status:   Future     Standing Expiration Date:   6/15/2024   • Wound compression and edema control     Elastic Tubular Stocking: Spanda- size F     Tubular elastic bandage: Apply from base of toes to behind the knee  Apply in AM, may remove for sleep  You may purchase compression socks on-line and wear daily  Avoid prolonged standing in one place  Elevate leg(s) above the level of the heart when sitting or as much as possible  Standing Status:   Future     Standing Expiration Date:   6/15/2024   • Wound cleansing and dressings     Wound location:     Left Ankle wound and Right thigh wound are healed    You may shower - apply non-scented moisturizer to healed areas and surrounding skin               Standing Status:   Future     Standing Expiration Date:   6/15/2024         Electronically signed by Desirae Bunn MD

## 2023-06-15 NOTE — PATIENT INSTRUCTIONS
Orders Placed This Encounter   Procedures    Wound cleansing and dressings     Wound location:     Left Ankle wound and Right thigh wound are healed    You may shower - apply non-scented moisturizer to healed areas and surrounding skin  Cleanse the wound with mild soap and water, rinse well, pat dry  (Do not submerge wounds)            Standing Status:   Future     Standing Expiration Date:   6/15/2024    Wound home care     Discharge from [facility] for wound care     Standing Status:   Future     Standing Expiration Date:   6/15/2024    Wound miscellaneous orders      To promote wound healing:   Increase protein intake to 3-4 servings per day  Maintain stable blood sugars       Standing Status:   Future     Standing Expiration Date:   6/15/2024    Wound compression and edema control     Elastic Tubular Stocking: Spanda- size F     Tubular elastic bandage: Apply from base of toes to behind the knee  Apply in AM, may remove for sleep  You may purchase compression socks on-line and wear daily  Avoid prolonged standing in one place  Elevate leg(s) above the level of the heart when sitting or as much as possible       Standing Status:   Future     Standing Expiration Date:   6/15/2024

## 2023-06-15 NOTE — LETTER
[address]  [place] [address]  Phone#  [phone]  Fax#  [phone]    Patient:  Estephania Proctor  YOB: 1955  Phone:  [phone]  Date of Visit:  6/15/2023    Orders Placed This Encounter   Procedures   • Wound home care     Discharge from [facility] for wound care     Standing Status:   Future     Standing Expiration Date:   6/15/2024   • Wound miscellaneous orders      To promote wound healing:   Increase protein intake to 3-4 servings per day  Maintain stable blood sugars       Standing Status:   Future     Standing Expiration Date:   6/15/2024   • Wound compression and edema control     Elastic Tubular Stocking: Spanda- size F     Tubular elastic bandage: Apply from base of toes to behind the knee  Apply in AM, may remove for sleep  You may purchase compression socks on-line and wear daily  Avoid prolonged standing in one place  Elevate leg(s) above the level of the heart when sitting or as much as possible  Standing Status:   Future     Standing Expiration Date:   6/15/2024   • Wound cleansing and dressings     Wound location:     Left Ankle wound and Right thigh wound are healed    You may shower - apply non-scented moisturizer to healed areas and surrounding skin               Standing Status:   Future     Standing Expiration Date:   6/15/2024         Electronically signed by Meseret Chamberlain MD

## 2023-07-21 ENCOUNTER — EVALUATION (OUTPATIENT)
Dept: PHYSICAL THERAPY | Facility: CLINIC | Age: 68
End: 2023-07-21
Payer: MEDICARE

## 2023-07-21 DIAGNOSIS — M54.12 CERVICAL RADICULOPATHY: Primary | ICD-10-CM

## 2023-07-21 DIAGNOSIS — Z98.1 S/P CERVICAL SPINAL FUSION: ICD-10-CM

## 2023-07-21 PROCEDURE — 97110 THERAPEUTIC EXERCISES: CPT | Performed by: PHYSICAL THERAPIST

## 2023-07-21 PROCEDURE — 97162 PT EVAL MOD COMPLEX 30 MIN: CPT | Performed by: PHYSICAL THERAPIST

## 2023-07-21 PROCEDURE — 97112 NEUROMUSCULAR REEDUCATION: CPT | Performed by: PHYSICAL THERAPIST

## 2023-07-21 NOTE — LETTER
2023    Yesenia Infante MD  [facility] [address]    Patient: Anamika Melissa   YOB: 1955   Date of Visit: 2023     Encounter Diagnosis     ICD-10-CM    1. Cervical radiculopathy  M54.12       2. S/P cervical spinal fusion  Z98.1           Dear Dr. Lee Poll: Thank you for your recent referral of Anamika Melissa. Please review the attached evaluation summary from Rachael's recent visit. Please verify that you agree with the plan of care by signing the attached order. If you have any questions or concerns, please do not hesitate to call. I sincerely appreciate the opportunity to share in the care of one of your patients and hope to have another opportunity to work with you in the near future. Sincerely,    Ny Schmitt, PT      Referring Provider:      I certify that I have read the below Plan of Care and certify the need for these services furnished under this plan of treatment while under my care. Yesenia Infante MD  [address] [place] [address]  Via Fax: [phone]          PT Evaluation     Today's date: 2023  Patient name: Anamika Melissa  : 1955  MRN: [id number]  Referring provider: Shaye Cherry*  Dx:   Encounter Diagnosis     ICD-10-CM    1. Cervical radiculopathy  M54.12       2. S/P cervical spinal fusion  Z98.1                      Assessment  Assessment details: Problem List:  1) Cervical muscle weakness/motor control  2) Poor posture  3) decreased cervical AROM     Anamika Melissa is a pleasant 78 y.o. female who presents today 9 weeks s/p multilevel cervical fusion. She presents with moderate to severe cervical muscle weakness, decreased cervical AROM and extremely poor posture. Objective findings today also demonstrate moderate fall risk.   Her greatest concern is severe cervical weakness contributing to challenges holding her head up with all functional standing and mobility tasks. She is progressing well from surgery despite time in the hospital related to LE ulcers an initial levels of severe pain. I expect she will continue to progress well with conservative cervical and UE stabilization program.      Comparable signs:  1) Time able to hold head up in standing/sitting without UE support   2) Cervical AROM Rot  Understanding of Dx/Px/POC: good   Prognosis: good    Goals  Pt will present with at least 45 degrees of cervical AROM rotation to decrease difficulty with driving in 4 weeks. Pt will hold head up in static standing for 5 minutes without using UE for support in 4 weeks. Pt will ambulate for 20 minutes without using UE for head support in 8 weeks. Pt will be independent with HEP in 8 weeks. Plan  Planned therapy interventions: manual therapy, therapeutic activities, therapeutic exercise, graded motor, graded exercise, graded activity, gait training, motor coordination training, therapeutic training and home exercise program  Frequency: 2x week  Duration in visits: 16  Duration in weeks: 8  Plan of Care beginning date: 7/21/2023  Plan of Care expiration date: 9/15/2023        Subjective Evaluation    History of Present Illness  Date of surgery: 5/18/2023  Mechanism of injury: Hussein Olvera reports a long history of chronic lower back pain. Woke up April 1st with an insidious and severe increase in cervical spine pain. May 18th multilevel fusion (C4-C7?), discharged from hospital 2 days later. "As soon as I got home I couldn't move." 5 days of not being able to walk with "my legs dangling" causing a week in the hospital to manage LE ulcers. Discharged to home early June to home care PT. After a week of home care she reports significant improvements in completing all self care ADLs with some help from her boyfriend. Homecare PT continued up until [address]   Was initially planning to start OPPT for cervical fusion sooner but "I wanted to be able to drive first." Prior to surgery primary complaint was severe central neck and bilateral UT pain - no significant weakness. Currently having difficulties with holding head up with walking and holding her head up in the shower. She also reports some stiffness with rotation while driving. She reports pain is currently well controlled with only tylenol. Overall she reports "I am pleased with my progress" but greatest concern is challenges with holding her head up. Quality of life: fair    Patient Goals  Patient goals for therapy: decreased pain and improved balance  Patient goal: "I want to hold my neck up better"   Pain  Current pain ratin  Location: Central upper cervical spine  Quality: dull ache    Treatments  Previous treatment: physical therapy      Objective:     Palpation:     Mild pain to minimal palpation along caudal end of incision  Incision intact and healing well     Posture:   Moderate increase in upper thoracic kyphosis, forward head  Constantly holding head up with hands, constantly moving head    ROM:       Cervical AROM   Flexion  25  Extension 20  R rotation 30  L rotation 22  R sidebend 8  L sidebend 12    Shoulder AROM  Left   Right  Shoulder flexion  160   160  Shoulder ER   WNL   WNL  Shoulder IR   WNL   WNL    All shoulder AROM pain free     Strength:       Deep Neck Flexors  Extremely poor     Shoulder FLX - 4/5 bilaterally  Biceps - 4/5 bilaterally   Triceps - 4/5 bilaterally   Wrist FLX/EXT - 4/5 bilaterally       strength WNL      Neural Screen:  ULTT1 and 2 - negative bilaterally     Reflexes:    Biceps     3+   3+  Brachioradialis   2+   2+  Triceps    2+   2+    Sensation:     WNL and no changes     Gait: Ambulating with either WW or SPC with decreased speed, moderate trunk flexion, mild unsteadiness  TUG Alone: 17 seconds with SPC / 15 seconds with SPC  Transfers STS: only with moderate UE push       Precautions: Cervical Fusion 23, HTN, Type 2 DM, Chronic LBP, Fall Risk      Manuals  STM                                                    Neuro Re-Ed             Wall Positioning                           Rows/Extension                                                                 Ther Ex             Seated Gentle Thoracic Ext                          Cervical Iso ALL 10x 3'                         Supine Conservative Cervical Rot 10x ea, 2 pillows            Supine Minimal Upper Cervical FLX 10x, 3'            Supine Cervical Retr Iso 10x                         Ther Activity                                       Gait Training                                       Modalities

## 2023-07-21 NOTE — PROGRESS NOTES
PT Evaluation     Today's date: 2023  Patient name: Anthony Montoya  : 1955  MRN: [id number]  Referring provider: Dima Khan*  Dx:   Encounter Diagnosis     ICD-10-CM    1. Cervical radiculopathy  M54.12       2. S/P cervical spinal fusion  Z98.1                      Assessment  Assessment details: Problem List:  1) Cervical muscle weakness/motor control  2) Poor posture  3) decreased cervical AROM     Anthony Montoya is a pleasant 78 y.o. female who presents today 9 weeks s/p multilevel cervical fusion. She presents with moderate to severe cervical muscle weakness, decreased cervical AROM and extremely poor posture. Objective findings today also demonstrate moderate fall risk. Her greatest concern is severe cervical weakness contributing to challenges holding her head up with all functional standing and mobility tasks. She is progressing well from surgery despite time in the hospital related to LE ulcers an initial levels of severe pain. I expect she will continue to progress well with conservative cervical and UE stabilization program.      Comparable signs:  1) Time able to hold head up in standing/sitting without UE support   2) Cervical AROM Rot  Understanding of Dx/Px/POC: good   Prognosis: good    Goals  Pt will present with at least 45 degrees of cervical AROM rotation to decrease difficulty with driving in 4 weeks. Pt will hold head up in static standing for 5 minutes without using UE for support in 4 weeks. Pt will ambulate for 20 minutes without using UE for head support in 8 weeks. Pt will be independent with HEP in 8 weeks.     Plan  Planned therapy interventions: manual therapy, therapeutic activities, therapeutic exercise, graded motor, graded exercise, graded activity, gait training, motor coordination training, therapeutic training and home exercise program  Frequency: 2x week  Duration in visits: 16  Duration in weeks: 8  Plan of Care beginning date: 2023  Plan of Care expiration date: 9/15/2023        Subjective Evaluation    History of Present Illness  Date of surgery: 2023  Mechanism of injury: Olga reports a long history of chronic lower back pain. Woke up  with an insidious and severe increase in cervical spine pain. May 18th multilevel fusion (C4-C7?), discharged from hospital 2 days later. "As soon as I got home I couldn't move." 5 days of not being able to walk with "my legs dangling" causing a week in the hospital to manage LE ulcers. Discharged to home early  to home care PT. After a week of home care she reports significant improvements in completing all self care ADLs with some help from her boyfriend. Homecare PT continued up until  Reynaldo Rogers. Was initially planning to start OPPT for cervical fusion sooner but "I wanted to be able to drive first." Prior to surgery primary complaint was severe central neck and bilateral UT pain - no significant weakness. Currently having difficulties with holding head up with walking and holding her head up in the shower. She also reports some stiffness with rotation while driving. She reports pain is currently well controlled with only tylenol. Overall she reports "I am pleased with my progress" but greatest concern is challenges with holding her head up. Quality of life: fair    Patient Goals  Patient goals for therapy: decreased pain and improved balance  Patient goal: "I want to hold my neck up better"   Pain  Current pain ratin  Location: Central upper cervical spine  Quality: dull ache    Treatments  Previous treatment: physical therapy      Objective:     Palpation:     Mild pain to minimal palpation along caudal end of incision  Incision intact and healing well     Posture:   Moderate increase in upper thoracic kyphosis, forward head  Constantly holding head up with hands, constantly moving head    ROM:       Cervical AROM   Flexion  25  Extension 20  R rotation 30  L rotation 22  R sidebend 8  L sidebend 12    Shoulder AROM  Left   Right  Shoulder flexion  160   160  Shoulder ER   WNL   WNL  Shoulder IR   WNL   WNL    All shoulder AROM pain free     Strength:       Deep Neck Flexors  Extremely poor     Shoulder FLX - 4/5 bilaterally  Biceps - 4/5 bilaterally   Triceps - 4/5 bilaterally   Wrist FLX/EXT - 4/5 bilaterally       strength WNL      Neural Screen:  ULTT1 and 2 - negative bilaterally     Reflexes:    Biceps     3+   3+  Brachioradialis   2+   2+  Triceps    2+   2+    Sensation:     WNL and no changes     Gait: Ambulating with either WW or SPC with decreased speed, moderate trunk flexion, mild unsteadiness  TUG Alone: 17 seconds with SPC / 15 seconds with SPC  Transfers STS: only with moderate UE push        Precautions: Cervical Fusion 5/18/23, HTN, Type 2 DM, Chronic LBP, Fall Risk      Manuals 7/21            STM                                                    Neuro Re-Ed             Wall Positioning                           Rows/Extension                                                                 Ther Ex             Seated Gentle Thoracic Ext                          Cervical Iso ALL 10x 3'                         Supine Conservative Cervical Rot 10x ea, 2 pillows            Supine Minimal Upper Cervical FLX 10x, 3'            Supine Cervical Retr Iso 10x                         Ther Activity                                       Gait Training                                       Modalities

## 2023-08-01 ENCOUNTER — OFFICE VISIT (OUTPATIENT)
Dept: PHYSICAL THERAPY | Facility: CLINIC | Age: 68
End: 2023-08-01
Payer: MEDICARE

## 2023-08-01 DIAGNOSIS — M54.12 CERVICAL RADICULOPATHY: Primary | ICD-10-CM

## 2023-08-01 DIAGNOSIS — Z98.1 S/P CERVICAL SPINAL FUSION: ICD-10-CM

## 2023-08-01 PROCEDURE — 97112 NEUROMUSCULAR REEDUCATION: CPT | Performed by: PHYSICAL THERAPIST

## 2023-08-01 PROCEDURE — 97110 THERAPEUTIC EXERCISES: CPT | Performed by: PHYSICAL THERAPIST

## 2023-08-01 NOTE — PROGRESS NOTES
Daily Note     Today's date: 2023  Patient name: Sepideh Betancourt  : 1955  MRN: [id number]  Referring provider: Kimmy Espinal*  Dx:   Encounter Diagnosis     ICD-10-CM    1. Cervical radiculopathy  M54.12       2. S/P cervical spinal fusion  Z98.1                      Subjective: Pt reports some inconsistency with compliance to home exercises but has tried harder to not support her neck with her UE as much. She feels her neck has gotten stronger. Objective: See treatment diary below      Assessment: Pt has made some progress with cervical stability secondary to education on decreased UE support. Activities today focused on cervical and scapular stability and conservative aerobic conditioning which was all tolerated well. Plan: Continue per plan of care. Precautions: Cervical Fusion 23, HTN, Type 2 DM, Chronic LBP, Fall Risk      Manuals            STM                                                    Neuro Re-Ed             Wall Positioning                           Rows/Extension  15x ea. , orange                        Supine Horizontal ABD  2x10, orange                                     Ther Ex             Seated Gentle Thoracic Ext                          Cervical Iso ALL 10x 3' 10x 3'                        Supine Conservative Cervical Rot 10x ea, 2 pillows 10x ea. , 2 pillows           Supine Minimal Upper Cervical FLX 10x, 3' 10x, 3'           Supine Cervical Retr Iso 10x 10x, 3'                        Ther Activity             TM  5 min                        Gait Training                                       Modalities

## 2023-08-02 ENCOUNTER — OFFICE VISIT (OUTPATIENT)
Dept: PHYSICAL THERAPY | Facility: CLINIC | Age: 68
End: 2023-08-02
Payer: MEDICARE

## 2023-08-02 DIAGNOSIS — M54.12 CERVICAL RADICULOPATHY: Primary | ICD-10-CM

## 2023-08-02 DIAGNOSIS — Z98.1 S/P CERVICAL SPINAL FUSION: ICD-10-CM

## 2023-08-02 PROCEDURE — 97112 NEUROMUSCULAR REEDUCATION: CPT | Performed by: PHYSICAL THERAPIST

## 2023-08-02 PROCEDURE — 97110 THERAPEUTIC EXERCISES: CPT | Performed by: PHYSICAL THERAPIST

## 2023-08-02 NOTE — PROGRESS NOTES
Daily Note     Today's date: 2023  Patient name: Humaira Collier  : 1955  MRN: [id number]  Referring provider: Simran Rodríguez*  Dx:   Encounter Diagnosis     ICD-10-CM    1. Cervical radiculopathy  M54.12       2. S/P cervical spinal fusion  Z98.1                      Subjective: Olga continues to feel she is making progress with her strength and overall pain. Objective: See treatment diary below      Assessment: Pt tolerated continued progression well. Continued progression secondary to continued obvious cervical weakness and low overall irritability levels. Plan: Continue per plan of care. Precautions: Cervical Fusion 23, HTN, Type 2 DM, Chronic LBP, Fall Risk      Manuals           STM                                                    Neuro Re-Ed             Wall Positioning                           Rows/Extension  15x ea. , orange 15x [place] 7.5#          Seated Bilateral ER   2x10 GTB          Supine Horizontal ABD  2x10, orange 2x10 GTB                                    Ther Ex             Seated Gentle Thoracic Ext   2x10, with SPC [place]                       Cervical Iso ALL 10x 3' 10x 3' 10x3'                       Supine Conservative Cervical Rot 10x ea, 2 pillows 10x ea. , 2 pillows 10x ea.           Supine Minimal Upper Cervical FLX 10x, 3' 10x, 3' 10x, 3'          Supine Cervical Retr Iso 10x 10x, 3' 10x, 3'                       Ther Activity             TM  5 min 2x5 min                       Gait Training                                       Modalities

## 2023-08-07 ENCOUNTER — OFFICE VISIT (OUTPATIENT)
Dept: PHYSICAL THERAPY | Facility: CLINIC | Age: 68
End: 2023-08-07
Payer: MEDICARE

## 2023-08-07 DIAGNOSIS — Z98.1 S/P CERVICAL SPINAL FUSION: ICD-10-CM

## 2023-08-07 DIAGNOSIS — M54.12 CERVICAL RADICULOPATHY: Primary | ICD-10-CM

## 2023-08-07 PROCEDURE — 97110 THERAPEUTIC EXERCISES: CPT | Performed by: PHYSICAL THERAPIST

## 2023-08-07 PROCEDURE — 97112 NEUROMUSCULAR REEDUCATION: CPT | Performed by: PHYSICAL THERAPIST

## 2023-08-07 NOTE — PROGRESS NOTES
Daily Note     Today's date: 2023  Patient name: Luis Gandhi  : 1955  MRN: [id number]  Referring provider: Peri Wood*  Dx:   Encounter Diagnosis     ICD-10-CM    1. Cervical radiculopathy  M54.12       2. S/P cervical spinal fusion  Z98.1                      Subjective: Sylvia Eldridge reports some increased soreness today she reports is secondary to the weather. Objective: See treatment diary below      Assessment: Pt continues to tolerate all activity really well despite some increased sorneess today allowing for gradual progression into continued shoulder stabilization and general strengthening. Some continued lack of uper cervical motor control limits AROM with continued cueing required to improve this. Plan: Continue per plan of care. Precautions: Cervical Fusion 23, HTN, Type 2 DM, Chronic LBP, Fall Risk      Manuals          [facility]                                                    Neuro Re-Ed             Wall Positioning                           Rows/Extension  15x ea. , orange 15x [place] 7.5# 2x15 [place] 8#         Seated Bilateral ER   2x10 GTB 2x10 GTB         Supine Horizontal ABD  2x10, orange 2x10 GTB 2x10 GTB                                   Ther Ex             Seated Gentle Thoracic Ext   2x10, with [facility] [place] 2x10, with [facility] [place]                      Cervical Iso ALL 10x 3' 10x 3' 10x3' 10x3'                      Supine Conservative Cervical Rot 10x ea, 2 pillows 10x ea. , 2 pillows 10x ea. 10x ea.          Supine Minimal Upper Cervical FLX 10x, 3' 10x, 3' 10x, 3' 10x, 3'         Supine Cervical Retr Iso 10x 10x, 3' 10x, 3' 10x 3', AROM                      Ther Activity             TM  5 min 2x5 min 7 min         Standing Shoulder FLX    2x10         Gait Training                                       Modalities

## 2023-08-09 ENCOUNTER — OFFICE VISIT (OUTPATIENT)
Dept: PHYSICAL THERAPY | Facility: CLINIC | Age: 68
End: 2023-08-09
Payer: MEDICARE

## 2023-08-09 DIAGNOSIS — M54.12 CERVICAL RADICULOPATHY: Primary | ICD-10-CM

## 2023-08-09 DIAGNOSIS — Z98.1 S/P CERVICAL SPINAL FUSION: ICD-10-CM

## 2023-08-09 PROCEDURE — 97110 THERAPEUTIC EXERCISES: CPT | Performed by: PHYSICAL THERAPIST

## 2023-08-09 PROCEDURE — 97112 NEUROMUSCULAR REEDUCATION: CPT | Performed by: PHYSICAL THERAPIST

## 2023-08-09 PROCEDURE — 97530 THERAPEUTIC ACTIVITIES: CPT | Performed by: PHYSICAL THERAPIST

## 2023-08-09 NOTE — PROGRESS NOTES
Daily Note     Today's date: 2023  Patient name: Theola Apgar  : 1955  MRN: [id number]  Referring provider: Mathieu Lee*  Dx:   Encounter Diagnosis     ICD-10-CM    1. Cervical radiculopathy  M54.12       2. S/P cervical spinal fusion  Z98.1                      Subjective: Sandra Lehman reports she is not as stiff/sore as her last session but continues to report persistent cervical stiffness      Objective: See treatment diary below      Assessment: Continued emphasis on general cervical and scapular stabilization was tolerated extremely well. No aggressive progression other than UBE with patient reports of obvious and significant relief in muscle guarding after. Plan: Continue per plan of care. Precautions: Cervical Fusion 23, HTN, Type 2 DM, Chronic LBP, Fall Risk      Manuals         [facility]                                                    Neuro Re-Ed             Wall Positioning                           Rows/Extension  15x ea. , orange 15x [place] 7.5# 2x15 [place] 8# 2x15 [place] 10#        Seated Bilateral ER   2x10 GTB 2x10 GTB 2x10 GTB        Supine Horizontal ABD  2x10, orange 2x10 GTB 2x10 GTB 2x10 GTB                                  Ther Ex             Seated Gentle Thoracic Ext   2x10, with SPC [place] 2x10, with SPC [place] 2x10 with SPC [place]        UBE     2x2 min        Cervical Iso ALL 10x 3' 10x 3' 10x3' 10x3' 10x 3'                     Supine Conservative Cervical Rot 10x ea, 2 pillows 10x ea. , 2 pillows 10x ea. 10x ea. 10x ea.         Supine Minimal Upper Cervical FLX 10x, 3' 10x, 3' 10x, 3' 10x, 3' 10x, 3'1        Supine Cervical Retr Iso 10x 10x, 3' 10x, 3' 10x 3', AROM 0x 3' AROM                     Ther Activity             TM  5 min 2x5 min 7 min 2x7 min        Standing Shoulder FLX    2x10 2x10        Gait Training                                       Modalities

## 2023-08-15 ENCOUNTER — APPOINTMENT (OUTPATIENT)
Dept: PHYSICAL THERAPY | Facility: CLINIC | Age: 68
End: 2023-08-15
Payer: MEDICARE

## 2023-08-16 ENCOUNTER — OFFICE VISIT (OUTPATIENT)
Dept: PHYSICAL THERAPY | Facility: CLINIC | Age: 68
End: 2023-08-16
Payer: MEDICARE

## 2023-08-16 DIAGNOSIS — Z98.1 S/P CERVICAL SPINAL FUSION: ICD-10-CM

## 2023-08-16 DIAGNOSIS — M54.12 CERVICAL RADICULOPATHY: Primary | ICD-10-CM

## 2023-08-16 PROCEDURE — 97112 NEUROMUSCULAR REEDUCATION: CPT | Performed by: PHYSICAL THERAPIST

## 2023-08-16 PROCEDURE — 97110 THERAPEUTIC EXERCISES: CPT | Performed by: PHYSICAL THERAPIST

## 2023-08-16 NOTE — PROGRESS NOTES
Daily Note     Today's date: 2023  Patient name: Anamika Melissa  : 1955  MRN: [id number]  Referring provider: Shaye Cherry*  Dx:   Encounter Diagnosis     ICD-10-CM    1. Cervical radiculopathy  M54.12       2. S/P cervical spinal fusion  Z98.1                      Subjective: Vannessa Delarosa reports she is not as stiff/sore as her last session but continues to report persistent cervical stiffness      Objective: See treatment diary below      Assessment: Continued emphasis on general cervical and scapular stabilization was tolerated extremely well. No aggressive progression other than UBE with patient reports of obvious and significant relief in muscle guarding after. Plan: Continue per plan of care. Precautions: Cervical Fusion 23, HTN, Type 2 DM, Chronic LBP, Fall Risk      Manuals        STM                                                    Neuro Re-Ed             Wall Positioning                           Rows/Extension  15x ea. , orange 15x [place] 7.5# 2x15 [place] 8# 2x15 [place] 10# 2x15 [place] 10#       Seated Bilateral ER   2x10 GTB 2x10 GTB 2x10 GTB 2x10 BTB       Supine Horizontal ABD  2x10, orange 2x10 GTB 2x10 GTB 2x10 GTB 2x10 BTB                                 Ther Ex             Seated Gentle Thoracic Ext   2x10, with SPC [place] 2x10, with SPC [place] 2x10 with SPC [place] 2x10 with SPC [place]       UBE     2x2 min 3x3 min       Cervical Iso ALL 10x 3' 10x 3' 10x3' 10x3' 10x 3' 10x 3'                    Supine Conservative Cervical Rot 10x ea, 2 pillows 10x ea. , 2 pillows 10x ea. 10x ea. 10x ea. 10x ea.        Supine Minimal Upper Cervical FLX 10x, 3' 10x, 3' 10x, 3' 10x, 3' 10x, 3'1 10x 3'       Supine Cervical Retr Iso 10x 10x, 3' 10x, 3' 10x 3', AROM 0x 3' AROM 10x3' AROM with rotation                     Ther Activity             TM  5 min 2x5 min 7 min 2x7 min 2x7 min       Standing Shoulder FLX    2x10 2x10 2x15,        Gait Training Modalities

## 2023-08-18 ENCOUNTER — OFFICE VISIT (OUTPATIENT)
Dept: PHYSICAL THERAPY | Facility: CLINIC | Age: 68
End: 2023-08-18
Payer: MEDICARE

## 2023-08-18 DIAGNOSIS — Z98.1 S/P CERVICAL SPINAL FUSION: ICD-10-CM

## 2023-08-18 DIAGNOSIS — M54.12 CERVICAL RADICULOPATHY: Primary | ICD-10-CM

## 2023-08-18 PROCEDURE — 97112 NEUROMUSCULAR REEDUCATION: CPT | Performed by: PHYSICAL THERAPIST

## 2023-08-18 PROCEDURE — 97110 THERAPEUTIC EXERCISES: CPT | Performed by: PHYSICAL THERAPIST

## 2023-08-18 NOTE — PROGRESS NOTES
Daily Note     Today's date: 2023  Patient name: Declan Jack  : 1955  MRN: [id number]  Referring provider: Julianne Díaz*  Dx:   Encounter Diagnosis     ICD-10-CM    1. Cervical radiculopathy  M54.12       2. S/P cervical spinal fusion  Z98.1                      Subjective: Dru Hwang reports she is not as stiff/sore as her last session but continues to report persistent cervical stiffness      Objective: See treatment diary below      Assessment: Continued muscle energy with cervical rotation to address some mild continued muscle guarding. Increased emphasis on thoracic rotation to address compensate for persistent deficits. Plan: Continue per plan of care. Precautions: Cervical Fusion 23, HTN, Type 2 DM, Chronic LBP, Fall Risk      Manuals       [facility]                                                    Neuro Re-Ed             Wall Positioning                           Rows/Extension  15x ea. , orange 15x [place] 7.5# 2x15 [place] 8# 2x15 [place] 10# 2x15 [place] 10# 2x15 10#      Seated Bilateral ER   2x10 GTB 2x10 GTB 2x10 GTB 2x10 BTB       Supine Horizontal ABD  2x10, orange 2x10 GTB 2x10 GTB 2x10 GTB 2x10 BTB 2x20 BTB      SL Thoracic Rot       10x ea. Ther Ex             Seated Gentle Thoracic Ext   2x10, with SPC [place] 2x10, with SPC [place] 2x10 with SPC [place] 2x10 with SPC [place] 2x10 with SPC [place]      UBE     2x2 min 3x3 min 3x3 min      Cervical Iso ALL 10x 3' 10x 3' 10x3' 10x3' 10x 3' 10x 3' 10x 3'                   Supine Conservative Cervical Rot 10x ea, 2 pillows 10x ea. , 2 pillows 10x ea. 10x ea. 10x ea. 10x ea. 10x      Supine Minimal Upper Cervical FLX 10x, 3' 10x, 3' 10x, 3' 10x, 3' 10x, 3'1 10x 3' 10x 3', 10x with lift      Supine Cervical Retr Iso 10x 10x, 3' 10x, 3' 10x 3', AROM 0x 3' AROM 10x3' AROM with rotation  10x 3' AROM with rotation      Seated Thoracic Rotation        10x ea.       Ther Activity             TM  5 min 2x5 min 7 min 2x7 min 2x7 min 2x7 min      Standing Shoulder FLX    2x10 2x10 2x15,  2x15, 1#      Gait Training                                       Modalities

## 2023-08-22 ENCOUNTER — OFFICE VISIT (OUTPATIENT)
Dept: PHYSICAL THERAPY | Facility: CLINIC | Age: 68
End: 2023-08-22
Payer: MEDICARE

## 2023-08-22 DIAGNOSIS — Z98.1 S/P CERVICAL SPINAL FUSION: ICD-10-CM

## 2023-08-22 DIAGNOSIS — M54.12 CERVICAL RADICULOPATHY: Primary | ICD-10-CM

## 2023-08-22 PROCEDURE — 97110 THERAPEUTIC EXERCISES: CPT | Performed by: PHYSICAL THERAPIST

## 2023-08-22 PROCEDURE — 97112 NEUROMUSCULAR REEDUCATION: CPT | Performed by: PHYSICAL THERAPIST

## 2023-08-22 NOTE — PROGRESS NOTES
Daily Note     Today's date: 2023  Patient name: Kellen Leyva  : 1955  MRN: [id number]  Referring provider: Elvin Lorenzo*  Dx:   Encounter Diagnosis     ICD-10-CM    1. Cervical radiculopathy  M54.12       2. S/P cervical spinal fusion  Z98.1                      Subjective: Olga reports some mild stiffness from the weekend. Objective: See treatment diary below      Assessment: Continued activities focused on thoracic rotation motor control, cervical AROM motor control, and general cervical and scapular stabilization. Some continued difficulties with DCF with trial of biofeedback today with variable success. Plan: Continue per plan of care. Precautions: Cervical Fusion 23, HTN, Type 2 DM, Chronic LBP, Fall Risk      Manuals      [facility]                                                    Neuro Re-Ed             Wall Positioning                           Rows/Extension  15x ea. , orange 15x [place] 7.5# 2x15 [place] 8# 2x15 [place] 10# 2x15 [place] 10# 2x15 10# 2x15 12# / UL Row with thoracic Rot 15x, 8#     Seated Bilateral ER   2x10 GTB 2x10 GTB 2x10 GTB 2x10 BTB  2x10 BTB     Supine Horizontal ABD  2x10, orange 2x10 GTB 2x10 GTB 2x10 GTB 2x10 BTB 2x20 BTB 2x20 BTB     SL Thoracic Rot       10x ea. 10x ea. Ther Ex             Seated Gentle Thoracic Ext   2x10, with SPC [place] 2x10, with SPC [place] 2x10 with SPC [place] 2x10 with SPC [place] 2x10 with SPC [place] 2x10 with SPC [place]     UBE     2x2 min 3x3 min 3x3 min 4x4 min     Cervical Iso ALL 10x 3' 10x 3' 10x3' 10x3' 10x 3' 10x 3' 10x 3' 10x 3'                  Supine Conservative Cervical Rot 10x ea, 2 pillows 10x ea. , 2 pillows 10x ea. 10x ea. 10x ea. 10x ea.  10x 10x     Supine Minimal Upper Cervical FLX 10x, 3' 10x, 3' 10x, 3' 10x, 3' 10x, 3'1 10x 3' 10x 3', 10x with lift 10x 3' 10x with lift     Supine Cervical Retr Iso 10x 10x, 3' 10x, 3' 10x 3', AROM 0x 3' AROM 10x3' AROM with rotation  10x 3' AROM with rotation 10x 3' AROM with rot     Seated Thoracic Rotation        10x ea.  10x     Ther Activity             TM  5 min 2x5 min 7 min 2x7 min 2x7 min 2x7 min 2x7 min      Standing Shoulder FLX    2x10 2x10 2x15,  2x15, 1# 2x15 1#     Gait Training                                       Modalities

## 2023-08-23 ENCOUNTER — OFFICE VISIT (OUTPATIENT)
Dept: PHYSICAL THERAPY | Facility: CLINIC | Age: 68
End: 2023-08-23
Payer: MEDICARE

## 2023-08-23 DIAGNOSIS — Z98.1 S/P CERVICAL SPINAL FUSION: ICD-10-CM

## 2023-08-23 DIAGNOSIS — M54.12 CERVICAL RADICULOPATHY: Primary | ICD-10-CM

## 2023-08-23 PROCEDURE — 97112 NEUROMUSCULAR REEDUCATION: CPT | Performed by: PHYSICAL THERAPIST

## 2023-08-23 PROCEDURE — 97110 THERAPEUTIC EXERCISES: CPT | Performed by: PHYSICAL THERAPIST

## 2023-08-23 NOTE — PROGRESS NOTES
Daily Note     Today's date: 2023  Patient name: Divine Ross  : 1955  MRN: [id number]  Referring provider: Jayleen Juan*  Dx:   Encounter Diagnosis     ICD-10-CM    1. Cervical radiculopathy  M54.12       2. S/P cervical spinal fusion  Z98.1                      Subjective: Maira Young reports some increased neck stiffness today - "I think I slept on it wrong"       Objective: See treatment diary below      Assessment: Continued current program with activities focused on decreased overall muscle guarding throughout the cervical spine. Plan: Continue per plan of care. Precautions: Cervical Fusion 23, HTN, Type 2 DM, Chronic LBP, Fall Risk      Manuals     STM                                                    Neuro Re-Ed             Wall Positioning                           Rows/Extension  15x ea. , orange 15x [place] 7.5# 2x15 [place] 8# 2x15 [place] 10# 2x15 [place] 10# 2x15 10# 2x15 12# / UL Row with thoracic Rot 15x, 8# 2x15 12# / UL Row with thoracic Rot 15x, 8#    Seated Bilateral ER   2x10 GTB 2x10 GTB 2x10 GTB 2x10 BTB  2x10 BTB 2x10 BTB    Supine Horizontal ABD  2x10, orange 2x10 GTB 2x10 GTB 2x10 GTB 2x10 BTB 2x20 BTB 2x20 BTB 2x20 BTB    SL Thoracic Rot       10x ea. 10x ea. 10x ea. Ther Ex             Seated Gentle Thoracic Ext   2x10, with SPC [place] 2x10, with SPC [place] 2x10 with SPC [place] 2x10 with SPC [place] 2x10 with SPC [place] 2x10 with SPC [place]     UBE     2x2 min 3x3 min 3x3 min 4x4 min 4x4 min    Cervical Iso ALL 10x 3' 10x 3' 10x3' 10x3' 10x 3' 10x 3' 10x 3' 10x 3' 10x3'    Pulleys with Cervical Rot         2x1 min     Supine Conservative Cervical Rot 10x ea, 2 pillows 10x ea. , 2 pillows 10x ea. 10x ea. 10x ea. 10x ea.  10x 10x 10x    Supine Minimal Upper Cervical FLX 10x, 3' 10x, 3' 10x, 3' 10x, 3' 10x, 3'1 10x 3' 10x 3', 10x with lift 10x 3' 10x with lift 10x 3' 10x with lift    Supine Cervical Retr Iso 10x 10x, 3' 10x, 3' 10x 3', AROM 0x 3' AROM 10x3' AROM with rotation  10x 3' AROM with rotation 10x 3' AROM with rot 10x 3' AROM with rot    Seated Thoracic Rotation        10x ea.  10x     Ther Activity             TM  5 min 2x5 min 7 min 2x7 min 2x7 min 2x7 min 2x7 min  2x7 min     Standing Shoulder FLX    2x10 2x10 2x15,  2x15, 1# 2x15 1# [place]    Gait Training                                       Modalities

## 2023-08-30 ENCOUNTER — APPOINTMENT (OUTPATIENT)
Dept: PHYSICAL THERAPY | Facility: CLINIC | Age: 68
End: 2023-08-30
Payer: MEDICARE

## 2023-09-01 ENCOUNTER — OFFICE VISIT (OUTPATIENT)
Dept: PHYSICAL THERAPY | Facility: CLINIC | Age: 68
End: 2023-09-01
Payer: MEDICARE

## 2023-09-01 DIAGNOSIS — M54.12 CERVICAL RADICULOPATHY: Primary | ICD-10-CM

## 2023-09-01 DIAGNOSIS — Z98.1 S/P CERVICAL SPINAL FUSION: ICD-10-CM

## 2023-09-01 PROCEDURE — 97112 NEUROMUSCULAR REEDUCATION: CPT | Performed by: PHYSICAL THERAPIST

## 2023-09-01 PROCEDURE — 97110 THERAPEUTIC EXERCISES: CPT | Performed by: PHYSICAL THERAPIST

## 2023-09-01 NOTE — PROGRESS NOTES
PT Evaluation     Today's date: 2023  Patient name: Steven Joy  : 1955  MRN: [id number]  Referring provider: Romelia Boas*  Dx:   Encounter Diagnosis     ICD-10-CM    1. Cervical radiculopathy  M54.12       2. S/P cervical spinal fusion  Z98.1                      Assessment  Assessment details: Problem List:  1) Cervical muscle weakness/motor control  2) Poor posture  3) decreased cervical AROM     Espinoza has made obvious and consistent progress with AROM and overall function over the last few weeks. Pain is much better and continued progression in activity has completely eliminated difficulty in holding head up. Despite this, she reports a severe increase in soreness over the last few days, waking up in the morning with a "sore and stiff" neck. Soreness seems mostly muscular with obvious trigger points throughout. She would benefit from continued cervical and UE strengthening and stabilization program to reduce incidence of muscular trigger points and pain and decrease difficulty with all functional reaching. Comparable signs:  1) Time able to hold head up in standing/sitting without UE support   2) Cervical AROM Rot  Understanding of Dx/Px/POC: good   Prognosis: good    Goals  Pt will present with at least 45 degrees of cervical AROM rotation to decrease difficulty with driving in 4 weeks. Pt will hold head up in static standing for 5 minutes without using UE for support in 4 weeks. Pt will ambulate for 20 minutes without using UE for head support in 8 weeks. Pt will be independent with HEP in 8 weeks.     Plan  Planned therapy interventions: manual therapy, therapeutic activities, therapeutic exercise, graded motor, graded exercise, graded activity, gait training, motor coordination training, therapeutic training and home exercise program  Frequency: 2x week  Duration in visits: 12  Duration in weeks: 6  Plan of Care beginning date: 2023  Plan of Care expiration date: 10/13/2023  Treatment plan discussed with: patient        Subjective Evaluation    History of Present Illness  Date of surgery: 2023  Mechanism of injury: Progress :    Overall Olga feels like she has made consistent and obvious progress with her overall neck pain up until this past Thursday where she woke up with severe pain, increasing Friday so much so that "I couldn't move my head." Severe pain leading to eventual rx of steroid. Some continued difficulty sleeping. Difficulty with holding head up has been completely eliminated. Valerie Conner reports a long history of chronic lower back pain. Woke up  with an insidious and severe increase in cervical spine pain. May 18th multilevel fusion (C4-C7?), discharged from hospital 2 days later. "As soon as I got home I couldn't move." 5 days of not being able to walk with "my legs dangling" causing a week in the hospital to manage LE ulcers. Discharged to home early  to home care PT. After a week of home care she reports significant improvements in completing all self care ADLs with some help from her boyfriend. Homecare PT continued up until  [facility]. Was initially planning to start OPPT for cervical fusion sooner but "I wanted to be able to drive first." Prior to surgery primary complaint was severe central neck and bilateral UT pain - no significant weakness. Currently having difficulties with holding head up with walking and holding her head up in the shower. She also reports some stiffness with rotation while driving. She reports pain is currently well controlled with only tylenol. Overall she reports "I am pleased with my progress" but greatest concern is challenges with holding her head up.    Quality of life: fair    Patient Goals  Patient goals for therapy: decreased pain and improved balance  Patient goal: "I want to hold my neck up better"   Pain  Current pain ratin  Location: Central upper cervical spine  Quality: dull ache    Treatments  Previous treatment: physical therapy      Objective:     Palpation:     Mild pain to minimal palpation along caudal end of incision  Incision intact and healing well     Posture: Moderate increase in upper thoracic kyphosis, forward head  Constantly holding head up with hands, constantly moving head    ROM:       Cervical AROM   Flexion  25  Extension 20  R rotation 38  L rotation 28  R sidebend 8  L sidebend 12    Shoulder AROM  Left   Right  Shoulder flexion  160   160  Shoulder ER   WNL   WNL  Shoulder IR   WNL   WNL    All shoulder AROM pain free     Strength:       Deep Neck Flexors  Improving, poor    Shoulder FLX - 4/5 bilaterally  Biceps - 4/5 bilaterally   Triceps - 4/5 bilaterally   Wrist FLX/EXT - 4/5 bilaterally       strength WNL      Neural Screen:  ULTT1 and 2 - negative bilaterally     Reflexes:    Biceps     3+   3+  Brachioradialis   2+   2+  Triceps    2+   2+    Sensation:     WNL and no changes     Gait: Ambulating with SPC and fair overall steadiness, occasionally w/o SPC  TUG Alone: 17 seconds with SPC / 15 seconds with SPC  Transfers STS: only with moderate UE push        Precautions: Cervical Fusion 5/18/23, HTN, Type 2 DM, Chronic LBP, Fall Risk    Manuals 7/21 8/1 8/2 8/7 8/9 8/16 8/18 8/22 8/23 9/1   STM                    ATS, levator, UT, Scalenes                                                                           Neuro Re-Ed                       Wall Positioning                                                Rows/Extension   15x ea. , orange 15x [place] 7.5# 2x15 [place] 8# 2x15 [place] 10# 2x15 [place] 10# 2x15 10# 2x15 12# / UL Row with thoracic Rot 15x, 8# 2x15 12# / UL Row with thoracic Rot 15x, 8#     Seated Bilateral ER     2x10 GTB 2x10 GTB 2x10 GTB 2x10 BTB   2x10 BTB 2x10 BTB     Supine Horizontal ABD   2x10, orange 2x10 GTB 2x10 GTB 2x10 GTB 2x10 BTB 2x20 BTB 2x20 BTB 2x20 BTB     SL Thoracic Rot             10x ea. 10x ea.  10x ea.  10x                           Ther Ex                       Seated Gentle Thoracic Ext     2x10, with SPC OH 2x10, with SPC OH 2x10 with SPC OH 2x10 with SPC OH 2x10 with SPC OH 2x10 with SPC OH    2x10   UBE         2x2 min 3x3 min 3x3 min 4x4 min 4x4 min     Cervical Iso ALL 10x 3' 10x 3' 10x3' 10x3' 10x 3' 10x 3' 10x 3' 10x 3' 10x3'  10x3'   Pulleys with Cervical Rot                 2x1 min   1 min, pain   Supine Conservative Cervical Rot 10x ea, 2 pillows 10x ea. , 2 pillows 10x ea. 10x ea. 10x ea. 10x ea. 10x 10x 10x  10x ea. Supine Minimal Upper Cervical FLX 10x, 3' 10x, 3' 10x, 3' 10x, 3' 10x, 3'1 10x 3' 10x 3', 10x with lift 10x 3' 10x with lift 10x 3' 10x with lift  10x   Supine Cervical Retr Iso 10x 10x, 3' 10x, 3' 10x 3', AROM 0x 3' AROM 10x3' AROM with rotation  10x 3' AROM with rotation 10x 3' AROM with rot 10x 3' AROM with rot  10x 3'   Seated Thoracic Rotation              10x ea.  10x       Ther Activity                       TM   5 min 2x5 min 7 min 2x7 min 2x7 min 2x7 min 2x7 min  2x7 min   2x7 min   Standing Shoulder FLX       2x10 2x10 2x15,  2x15, 1# 2x15 1# NV     Gait Training                                                                       Modalities

## 2023-09-01 NOTE — LETTER
2023    Moreno Messina MD  [facility] [address]    Patient: Surinder Parkinson   YOB: 1955   Date of Visit: 2023     Encounter Diagnosis     ICD-10-CM    1. Cervical radiculopathy  M54.12       2. S/P cervical spinal fusion  Z98.1           Dear Dr. Eloisa Messina: Thank you for your recent referral of Surinder Parkinson. Please review the attached evaluation summary from Rachael's recent visit. Please verify that you agree with the plan of care by signing the attached order. If you have any questions or concerns, please do not hesitate to call. I sincerely appreciate the opportunity to share in the care of one of your patients and hope to have another opportunity to work with you in the near future. Sincerely,    Wu Keita, PT      Referring Provider:      I certify that I have read the below Plan of Care and certify the need for these services furnished under this plan of treatment while under my care. Moreno Messina MD  [address] [place] [id number]  Via Fax: [phone]          PT Evaluation     Today's date: 2023  Patient name: Surinder Parkinson  : 1955  MRN: [id number]  Referring provider: Marla Bernard*  Dx:   Encounter Diagnosis     ICD-10-CM    1. Cervical radiculopathy  M54.12       2. S/P cervical spinal fusion  Z98.1                      Assessment  Assessment details: Problem List:  1) Cervical muscle weakness/motor control  2) Poor posture  3) decreased cervical AROM     Olga has made obvious and consistent progress with AROM and overall function over the last few weeks. Pain is much better and continued progression in activity has completely eliminated difficulty in holding head up. Despite this, she reports a severe increase in soreness over the last few days, waking up in the morning with a "sore and stiff" neck.   Soreness seems mostly muscular with obvious trigger points throughout. She would benefit from continued cervical and UE strengthening and stabilization program to reduce incidence of muscular trigger points and pain and decrease difficulty with all functional reaching. Comparable signs:  1) Time able to hold head up in standing/sitting without UE support   2) Cervical AROM Rot  Understanding of Dx/Px/POC: good   Prognosis: good    Goals  Pt will present with at least 45 degrees of cervical AROM rotation to decrease difficulty with driving in 4 weeks. Pt will hold head up in static standing for 5 minutes without using UE for support in 4 weeks. Pt will ambulate for 20 minutes without using UE for head support in 8 weeks. Pt will be independent with HEP in 8 weeks. Plan  Planned therapy interventions: manual therapy, therapeutic activities, therapeutic exercise, graded motor, graded exercise, graded activity, gait training, motor coordination training, therapeutic training and home exercise program  Frequency: 2x week  Duration in visits: 12  Duration in weeks: 6  Plan of Care beginning date: 9/1/2023  Plan of Care expiration date: 10/13/2023  Treatment plan discussed with: patient        Subjective Evaluation    History of Present Illness  Date of surgery: 5/18/2023  Mechanism of injury: Progress 9/1:    Overall Natalie Tsai feels like she has made consistent and obvious progress with her overall neck pain up until this past Thursday where she woke up with severe pain, increasing Friday so much so that "I couldn't move my head." Severe pain leading to eventual rx of steroid. Some continued difficulty sleeping. Difficulty with holding head up has been completely eliminated. Natalie Tsai reports a long history of chronic lower back pain. Woke up April 1st with an insidious and severe increase in cervical spine pain. May 18th multilevel fusion (C4-C7?), discharged from hospital 2 days later.   "As soon as I got home I couldn't move." 5 days of not being able to walk with "my legs dangling" causing a week in the hospital to manage LE ulcers. Discharged to home early  to home care PT. After a week of home care she reports significant improvements in completing all self care ADLs with some help from her boyfriend. Homecare PT continued up until [address] Was initially planning to start OPPT for cervical fusion sooner but "I wanted to be able to drive first." Prior to surgery primary complaint was severe central neck and bilateral UT pain - no significant weakness. Currently having difficulties with holding head up with walking and holding her head up in the shower. She also reports some stiffness with rotation while driving. She reports pain is currently well controlled with only tylenol. Overall she reports "I am pleased with my progress" but greatest concern is challenges with holding her head up. Quality of life: fair    Patient Goals  Patient goals for therapy: decreased pain and improved balance  Patient goal: "I want to hold my neck up better"   Pain  Current pain ratin  Location: Central upper cervical spine  Quality: dull ache    Treatments  Previous treatment: physical therapy      Objective:     Palpation:     Mild pain to minimal palpation along caudal end of incision  Incision intact and healing well     Posture:   Moderate increase in upper thoracic kyphosis, forward head  Constantly holding head up with hands, constantly moving head    ROM:       Cervical AROM   Flexion  25  Extension 20  R rotation 38  L rotation 28  R sidebend 8  L sidebend 12    Shoulder AROM  Left   Right  Shoulder flexion  160   160  Shoulder ER   WNL   WNL  Shoulder IR   WNL   WNL    All shoulder AROM pain free     Strength:       Deep Neck Flexors  Improving, poor    Shoulder FLX - 4/5 bilaterally  Biceps - 4/5 bilaterally   Triceps - 4/5 bilaterally   Wrist FLX/EXT - 4/5 bilaterally       strength WNL      Neural Screen:  ULTT1 and 2 - negative bilaterally Reflexes:    Biceps     3+   3+  Brachioradialis   2+   2+  Triceps    2+   2+    Sensation:     WNL and no changes     Gait: Ambulating with SPC and fair overall steadiness, occasionally w/o SPC  TUG Alone: 17 seconds with SPC / 15 seconds with SPC  Transfers STS: only with moderate UE push       Precautions: Cervical Fusion 5/18/23, HTN, Type 2 DM, Chronic LBP, Fall Risk    Manuals 7/21 8/1 8/2 8/7 8/9 8/16 8/18 8/22 8/23 9/1   STM                    ATS, levator, UT, Scalenes                                                                           Neuro Re-Ed                       Wall Positioning                                                Rows/Extension   15x ea. , orange 15x [place] 7.5# 2x15 [place] 8# 2x15 [place] 10# 2x15 [place] 10# 2x15 10# 2x15 12# / UL Row with thoracic Rot 15x, 8# 2x15 12# / UL Row with thoracic Rot 15x, 8#     Seated Bilateral ER     2x10 GTB 2x10 GTB 2x10 GTB 2x10 BTB   2x10 BTB 2x10 BTB     Supine Horizontal ABD   2x10, orange 2x10 GTB 2x10 GTB 2x10 GTB 2x10 BTB 2x20 BTB 2x20 BTB 2x20 BTB     SL Thoracic Rot             10x ea. 10x ea. 10x ea.  10x                           Ther Ex                       Seated Gentle Thoracic Ext     2x10, with SPC OH 2x10, with SPC OH 2x10 with SPC OH 2x10 with SPC OH 2x10 with SPC OH 2x10 with SPC OH    2x10   UBE         2x2 min 3x3 min 3x3 min 4x4 min 4x4 min     Cervical Iso ALL 10x 3' 10x 3' 10x3' 10x3' 10x 3' 10x 3' 10x 3' 10x 3' 10x3'  10x3'   Pulleys with Cervical Rot                 2x1 min   1 min, pain   Supine Conservative Cervical Rot 10x ea, 2 pillows 10x ea. , 2 pillows 10x ea. 10x ea. 10x ea. 10x ea. 10x 10x 10x  10x ea.    Supine Minimal Upper Cervical FLX 10x, 3' 10x, 3' 10x, 3' 10x, 3' 10x, 3'1 10x 3' 10x 3', 10x with lift 10x 3' 10x with lift 10x 3' 10x with lift  10x   Supine Cervical Retr Iso 10x 10x, 3' 10x, 3' 10x 3', AROM 0x 3' AROM 10x3' AROM with rotation  10x 3' AROM with rotation 10x 3' AROM with rot 10x 3' AROM with rot  10x 3'   Seated Thoracic Rotation              10x ea.  10x       Ther Activity                       TM   5 min 2x5 min 7 min 2x7 min 2x7 min 2x7 min 2x7 min  2x7 min   2x7 min   Standing Shoulder FLX       2x10 2x10 2x15,  2x15, 1# 2x15 1# NV     Gait Training                                                                       Modalities

## 2023-09-05 ENCOUNTER — APPOINTMENT (OUTPATIENT)
Dept: PHYSICAL THERAPY | Facility: CLINIC | Age: 68
End: 2023-09-05
Payer: MEDICARE

## 2023-09-06 ENCOUNTER — OFFICE VISIT (OUTPATIENT)
Dept: PHYSICAL THERAPY | Facility: CLINIC | Age: 68
End: 2023-09-06
Payer: MEDICARE

## 2023-09-06 DIAGNOSIS — Z98.1 S/P CERVICAL SPINAL FUSION: ICD-10-CM

## 2023-09-06 DIAGNOSIS — M54.12 CERVICAL RADICULOPATHY: Primary | ICD-10-CM

## 2023-09-06 PROCEDURE — 97110 THERAPEUTIC EXERCISES: CPT | Performed by: PHYSICAL THERAPIST

## 2023-09-06 PROCEDURE — 97112 NEUROMUSCULAR REEDUCATION: CPT | Performed by: PHYSICAL THERAPIST

## 2023-09-06 NOTE — PROGRESS NOTES
Daily Note     Today's date: 2023  Patient name: Jonny Staley  : 1955  MRN: [id number]  Referring provider: Rizwana Styles*  Dx:   Encounter Diagnosis     ICD-10-CM    1. Cervical radiculopathy  M54.12       2. S/P cervical spinal fusion  Z98.1                      Subjective: Olga feels she has been making consistent improvements in pain since recent flare. Objective: See treatment diary below      Assessment: Returning to previous activities secondary to improvements in soreness. All activities tolerated well today. Emphasis on decreased cervical and UT muscle guarding, paraspinal muscle activation. Plan: Continue per plan of care. Precautions: Cervical Fusion 23, HTN, Type 2 DM, Chronic LBP, Fall Risk    Manuals    STM                 ATS, levator, UT, Scalenes                                                                  Neuro Re-Ed                    Wall Positioning                                          Rows/Extension 2x15 12# / UL   2x15 [place] 8# 2x15 [place] 10# 2x15 [place] 10# 2x15 10# 2x15 12# / UL Row with thoracic Rot 15x, 8# 2x15 12# / UL Row with thoracic Rot 15x, 8#     Seated Bilateral ER 2x10 GTB   2x10 GTB 2x10 GTB 2x10 BTB   2x10 BTB 2x10 BTB     Supine Horizontal ABD 2x10 GTB   2x10 GTB 2x10 GTB 2x10 BTB 2x20 BTB 2x20 BTB 2x20 BTB     SL Thoracic Rot 10x         10x ea. 10x ea. 10x ea.  10x                        Ther Ex                    Seated Gentle Thoracic Ext 2x10   2x10, with SPC OH 2x10 with SPC OH 2x10 with SPC OH 2x10 with SPC OH 2x10 with SPC OH    2x10   UBE 2x2 min     2x2 min 3x3 min 3x3 min 4x4 min 4x4 min     Cervical Iso ALL 10x3'   10x3' 10x 3' 10x 3' 10x 3' 10x 3' 10x3'  10x3'   Pulleys with Cervical Rot 2x1 min             2x1 min   1 min, pain   Supine Conservative Cervical Rot 10x ea. 10x ea. 10x ea. 10x ea. 10x 10x 10x  10x ea.    Supine Minimal Upper Cervical FLX 10x   10x, 3' 10x, 3'1 10x 3' 10x 3', 10x with lift 10x 3' 10x with lift 10x 3' 10x with lift  10x   Supine Cervical Retr Iso 10x 3'   10x 3', AROM 0x 3' AROM 10x3' AROM with rotation  10x 3' AROM with rotation 10x 3' AROM with rot 10x 3' AROM with rot  10x 3'   Seated Thoracic Rotation           10x ea.  10x       Ther Activity                    TM 2x7 min   7 min 2x7 min 2x7 min 2x7 min 2x7 min  2x7 min   2x7 min   Standing Shoulder FLX    2x10 2x10 2x15,  2x15, 1# 2x15 1# NV     Gait Training                                                              Modalities

## 2023-09-15 ENCOUNTER — OFFICE VISIT (OUTPATIENT)
Dept: PHYSICAL THERAPY | Facility: CLINIC | Age: 68
End: 2023-09-15
Payer: MEDICARE

## 2023-09-15 DIAGNOSIS — Z98.1 S/P CERVICAL SPINAL FUSION: ICD-10-CM

## 2023-09-15 DIAGNOSIS — M54.12 CERVICAL RADICULOPATHY: Primary | ICD-10-CM

## 2023-09-15 PROCEDURE — 97112 NEUROMUSCULAR REEDUCATION: CPT | Performed by: PHYSICAL THERAPIST

## 2023-09-15 PROCEDURE — 97110 THERAPEUTIC EXERCISES: CPT | Performed by: PHYSICAL THERAPIST

## 2023-09-15 NOTE — PROGRESS NOTES
Daily Note     Today's date: 9/15/2023  Patient name: Esequiel Puga  : 1955  MRN: [id number]  Referring provider: Henrietta Mercado*  Dx:   Encounter Diagnosis     ICD-10-CM    1. Cervical radiculopathy  M54.12       2. S/P cervical spinal fusion  Z98.1                      Subjective: Olga feels she is making consistent progress again. She is even sleeping slightly better. Objective: See treatment diary below      Assessment: Continue conservative return into stabilization activities today which was tolerated very well. Plan: Continue per plan of care. Precautions: Cervical Fusion 23, HTN, Type 2 DM, Chronic LBP, Fall Risk    Manuals 9/6 9/15  8/7 8/9 8/16 8/18 8/22 8/23  9/1   STM                 ATS, levator, UT, Scalenes                                                                  Neuro Re-Ed                    Wall Positioning                                          Rows/Extension 2x15 12# / UL 2x15 12# UL  2x15 [place] 8# 2x15 [place] 10# 2x15 [place] 10# 2x15 10# 2x15 12# / UL Row with thoracic Rot 15x, 8# 2x15 12# / UL Row with thoracic Rot 15x, 8#     Seated Bilateral ER 2x10 GTB 2x10 GTB  2x10 GTB 2x10 GTB 2x10 BTB   2x10 BTB 2x10 BTB     Supine Horizontal ABD 2x10 GTB 2x10 BTB  2x10 GTB 2x10 GTB 2x10 BTB 2x20 BTB 2x20 BTB 2x20 BTB     SL Thoracic Rot 10x 10x        10x ea. 10x ea. 10x ea.  10x    Seated CT [place]   10x                  Ther Ex                    Seated Gentle Thoracic Ext 2x10 2x10  2x10, with SPC OH 2x10 with SPC OH 2x10 with SPC OH 2x10 with SPC OH 2x10 with SPC OH    2x10   UBE 2x2 min 2x2 min    2x2 min 3x3 min 3x3 min 4x4 min 4x4 min     Cervical Iso ALL 10x3' 10x3'  10x3' 10x 3' 10x 3' 10x 3' 10x 3' 10x3'  10x3'   Pulleys with Cervical Rot 2x1 min 2x1 min            2x1 min   1 min, pain   Supine Conservative Cervical Rot 10x ea. 10x ea. 10x ea. 10x ea. 10x ea. 10x 10x 10x  10x ea.    Supine Minimal Upper Cervical FLX 10x 10x  10x, 3' 10x, 3'1 10x 3' 10x 3', 10x with lift 10x 3' 10x with lift 10x 3' 10x with lift  10x   Supine Cervical Retr Iso 10x 3' 10x 3'  10x 3', AROM 0x 3' AROM 10x3' AROM with rotation  10x 3' AROM with rotation 10x 3' AROM with rot 10x 3' AROM with rot  10x 3'   Seated Thoracic Rotation           10x ea.  10x       Ther Activity                    TM 2x7 min 2x7 min  7 min 2x7 min 2x7 min 2x7 min 2x7 min  2x7 min   2x7 min   Standing Shoulder FLX  x15  2x10 2x10 2x15,  2x15, 1# 2x15 1# NV     Gait Training                                                              Modalities

## 2023-09-20 ENCOUNTER — OFFICE VISIT (OUTPATIENT)
Dept: PHYSICAL THERAPY | Facility: CLINIC | Age: 68
End: 2023-09-20
Payer: MEDICARE

## 2023-09-20 DIAGNOSIS — Z98.1 S/P CERVICAL SPINAL FUSION: ICD-10-CM

## 2023-09-20 DIAGNOSIS — M54.12 CERVICAL RADICULOPATHY: Primary | ICD-10-CM

## 2023-09-20 PROCEDURE — 97110 THERAPEUTIC EXERCISES: CPT | Performed by: PHYSICAL THERAPIST

## 2023-09-20 PROCEDURE — 97112 NEUROMUSCULAR REEDUCATION: CPT | Performed by: PHYSICAL THERAPIST

## 2023-09-20 NOTE — PROGRESS NOTES
Daily Note     Today's date: 2023  Patient name: Sveta Choi  : 1955  MRN: [id number]  Referring provider: Han Rich*  Dx:   Encounter Diagnosis     ICD-10-CM    1. Cervical radiculopathy  M54.12       2. S/P cervical spinal fusion  Z98.1                      Subjective: Louann Phelan reports some improvements again recently. Some apprehension with fluctuating levels of AM pain. Objective: See treatment diary below      Assessment: Some mild thoracic spine stiffness into rotation with activities today focused on improving this as well as cervical spine muscle guarding. All activities tolerated well. Plan: Continue per plan of care. Precautions: Cervical Fusion 23, HTN, Type 2 DM, Chronic LBP, Fall Risk    Manuals 9/6 9/15 9/20    8/18 8/22 8/23  9/1   STM              ATS, levator, UT, Scalenes                                                         Neuro Re-Ed                 Wall Positioning                                    Rows/Extension 2x15 12# / UL 2x15 12# UL 2x15 12# UL    2x15 10# 2x15 12# / UL Row with thoracic Rot 15x, 8# 2x15 12# / UL Row with thoracic Rot 15x, 8#     Seated Bilateral ER 2x10 GTB 2x10 GTB 2x10 GTB      2x10 BTB 2x10 BTB     Supine Horizontal ABD 2x10 GTB 2x10 BTB 2x10 BTB    2x20 BTB 2x20 BTB 2x20 BTB     SL Thoracic Rot 10x 10x 10x    10x ea. 10x ea. 10x ea.  10x    Seated CT [place]   10x 10x              Ther Ex                 Seated Gentle Thoracic Ext 2x10 2x10 2x10    2x10 with SPC OH 2x10 with SPC OH    2x10   UBE 2x2 min 2x2 min 2x2 min    3x3 min 4x4 min 4x4 min     Cervical Iso ALL 10x3' 10x3' 10x3'    10x 3' 10x 3' 10x3'  10x3'   Pulleys with Cervical Rot 2x1 min 2x1 min 2x1 min        2x1 min   1 min, pain   Supine Conservative Cervical Rot 10x ea. 10x ea. 10x ea. 10x 10x 10x  10x ea.    Supine Minimal Upper Cervical FLX 10x 10x 10x    10x 3', 10x with lift 10x 3' 10x with lift 10x 3' 10x with lift  10x   Supine Cervical Retr Iso 10x 3' 10x 3' 10x 3'    10x 3' AROM with rotation 10x 3' AROM with rot 10x 3' AROM with rot  10x 3'   Seated Thoracic Rotation        10x ea.  10x       Ther Activity                 TM 2x7 min 2x7 min 2x7 min    2x7 min 2x7 min  2x7 min   2x7 min   Standing Shoulder FLX  x15 x15    2x15, 1# 2x15 1# NV     Gait Training                                                     Modalities

## 2023-09-22 ENCOUNTER — OFFICE VISIT (OUTPATIENT)
Dept: PHYSICAL THERAPY | Facility: CLINIC | Age: 68
End: 2023-09-22
Payer: MEDICARE

## 2023-09-22 DIAGNOSIS — Z98.1 S/P CERVICAL SPINAL FUSION: ICD-10-CM

## 2023-09-22 DIAGNOSIS — M54.12 CERVICAL RADICULOPATHY: Primary | ICD-10-CM

## 2023-09-22 PROCEDURE — 97112 NEUROMUSCULAR REEDUCATION: CPT | Performed by: PHYSICAL THERAPIST

## 2023-09-22 PROCEDURE — 97530 THERAPEUTIC ACTIVITIES: CPT | Performed by: PHYSICAL THERAPIST

## 2023-09-22 PROCEDURE — 97110 THERAPEUTIC EXERCISES: CPT | Performed by: PHYSICAL THERAPIST

## 2023-09-22 NOTE — PROGRESS NOTES
Daily Note     Today's date: 2023  Patient name: Maira Keene  : 1955  MRN: [id number]  Referring provider: Vandana Jacobsen*  Dx:   Encounter Diagnosis     ICD-10-CM    1. Cervical radiculopathy  M54.12       2. S/P cervical spinal fusion  Z98.1                      Subjective: Olga feels she is making progress. Has to take the next 3 weeks off secondary to some doctors appointments/busy personal schedule. Objective: See treatment diary below      Assessment: Increased emphasis on upper cervical mobility and decreased muscle guarding which was all tolerated well. Plan: Continue per plan of care. Progress note upon return. Precautions: Cervical Fusion 23, HTN, Type 2 DM, Chronic LBP, Fall Risk    Manuals 9/6 9/15 9/20 9/22   8/18 8/22 8/23  9/1   STM              ATS, levator, UT, Scalenes                                                         Neuro Re-Ed                 Wall Positioning                                    Rows/Extension 2x15 12# / UL 2x15 12# UL 2x15 12# UL 2x15 12# UL   2x15 10# 2x15 12# / UL Row with thoracic Rot 15x, 8# 2x15 12# / UL Row with thoracic Rot 15x, 8#     Seated Bilateral ER 2x10 GTB 2x10 GTB 2x10 GTB 2x10 GTB     2x10 BTB 2x10 BTB     Supine Horizontal ABD 2x10 GTB 2x10 BTB 2x10 BTB 2x10 BTB   2x20 BTB 2x20 BTB 2x20 BTB     SL Thoracic Rot 10x 10x 10x 10x   10x ea. 10x ea. 10x ea.  10x    Seated CT Junction   10x 10x 10x             Ther Ex                 Seated Gentle Thoracic Ext 2x10 2x10 2x10 2x10   2x10 with SPC OH 2x10 with [facility] [place]    2x10   UBE 2x2 min 2x2 min 2x2 min 2x2 min   3x3 min 4x4 min 4x4 min     Cervical Iso ALL 10x3' 10x3' 10x3' 10x3'   10x 3' 10x 3' 10x3'  10x3'   Pulleys with Cervical Rot 2x1 min 2x1 min 2x1 min 2x1 min       2x1 min   1 min, pain   Supine Conservative Cervical Rot 10x ea. 10x ea. 10x ea. 20x, with self OP and multiple pillow for increased cervical flexion    10x 10x 10x  10x ea.    Supine Minimal Upper Cervical FLX 10x 10x 10x 10x   10x 3', 10x with lift 10x 3' 10x with lift 10x 3' 10x with lift  10x   Supine Cervical Retr Iso 10x 3' 10x 3' 10x 3'    10x 3' AROM with rotation 10x 3' AROM with rot 10x 3' AROM with rot  10x 3'   Seated Thoracic Rotation        10x ea.  10x       Ther Activity                 TM 2x7 min 2x7 min 2x7 min 2x7 min   2x7 min 2x7 min  2x7 min   2x7 min   Standing Shoulder FLX  x15 x15 x15   2x15, 1# 2x15 1# NV     Gait Training                                                     Modalities

## 2023-10-17 ENCOUNTER — OFFICE VISIT (OUTPATIENT)
Dept: PHYSICAL THERAPY | Facility: CLINIC | Age: 68
End: 2023-10-17
Payer: MEDICARE

## 2023-10-17 DIAGNOSIS — Z98.1 S/P CERVICAL SPINAL FUSION: ICD-10-CM

## 2023-10-17 DIAGNOSIS — M54.12 CERVICAL RADICULOPATHY: Primary | ICD-10-CM

## 2023-10-17 PROCEDURE — 97110 THERAPEUTIC EXERCISES: CPT | Performed by: PHYSICAL THERAPIST

## 2023-10-17 PROCEDURE — 97530 THERAPEUTIC ACTIVITIES: CPT | Performed by: PHYSICAL THERAPIST

## 2023-10-17 PROCEDURE — 97112 NEUROMUSCULAR REEDUCATION: CPT | Performed by: PHYSICAL THERAPIST

## 2023-10-17 NOTE — LETTER
2023    Kathy Do MD  [facility] [address]    Patient: Matthias Ledesma   YOB: 1955   Date of Visit: 10/17/2023     Encounter Diagnosis     ICD-10-CM    1. Cervical radiculopathy  M54.12       2. S/P cervical spinal fusion  Z98.1           Dear Dr. Philip Dior: Thank you for your recent referral of Matthias Ledesma. Please review the attached evaluation summary from Rachael's recent visit. Please verify that you agree with the plan of care by signing the attached order. If you have any questions or concerns, please do not hesitate to call. I sincerely appreciate the opportunity to share in the care of one of your patients and hope to have another opportunity to work with you in the near future. Sincerely,    Chito Paiz, PT      Referring Provider:      I certify that I have read the below Plan of Care and certify the need for these services furnished under this plan of treatment while under my care. Kathy Do MD  [address] [place] [id number]  Via Fax: [phone]          PT Evaluation     Today's date: 10/17/2023  Patient name: Matthias Ledesma  : 1955  MRN: [id number]  Referring provider: Massiel Puga*  Dx:   Encounter Diagnosis     ICD-10-CM    1. Cervical radiculopathy  M54.12       2. S/P cervical spinal fusion  Z98.1                      Assessment  Assessment details: Problem List:  1) Cervical muscle weakness/motor control  2) Poor posture  3) decreased cervical AROM     Espinoza has made consistent improvement in upper thoracic/scapular muscle strength and motor control which has allowed for decreased frequency and intensity of increased cervical spine pain. Some persistent upper cervical muscle guarding contributing to AROM limitations with review of HEP today again to address. Appropriate for discharge to independent HEP NV.      Comparable signs:  1) Time able to hold head up in standing/sitting without UE support   2) Cervical AROM Rot  Understanding of Dx/Px/POC: good   Prognosis: good    Goals  Pt will present with at least 45 degrees of cervical AROM rotation to decrease difficulty with driving in 4 weeks. Making progress  Pt will hold head up in static standing for 5 minutes without using UE for support in 4 weeks. Pt will ambulate for 20 minutes without using UE for head support in 8 weeks. Making progress  Pt will be independent with HEP in 8 weeks. Making progress    Plan  Planned therapy interventions: manual therapy, therapeutic activities, therapeutic exercise, graded motor, graded exercise, graded activity, gait training, motor coordination training, therapeutic training and home exercise program  Frequency: 2x week  Duration in visits: 4  Duration in weeks: 3  Plan of Care beginning date: 10/13/2023  Plan of Care expiration date: 10/27/2023  Treatment plan discussed with: patient      Subjective Evaluation    History of Present Illness  Date of surgery: 5/18/2023  Mechanism of injury: Progress 10/17  Pt has made some progress with decreasing frequency and intensity of overall pain with most activities. Continued upper cervical muscle guarding and stiffness contributing to continued limitations with cervical rotation with HEP provided today to correct. Review of activities today as per patient request.  NV will be final visit secondary to upcoming lumbar surgery. Progress 9/1:    Overall Olga feels like she has made consistent and obvious progress with her overall neck pain up until this past Thursday where she woke up with severe pain, increasing Friday so much so that "I couldn't move my head." Severe pain leading to eventual rx of steroid. Some continued difficulty sleeping. Difficulty with holding head up has been completely eliminated. Keysha Garcia reports a long history of chronic lower back pain.   Woke up April 1st with an insidious and severe increase in cervical spine pain. May 18th multilevel fusion (C4-C7?), discharged from hospital 2 days later. "As soon as I got home I couldn't move." 5 days of not being able to walk with "my legs dangling" causing a week in the hospital to manage LE ulcers. Discharged to home early  to home care PT. After a week of home care she reports significant improvements in completing all self care ADLs with some help from her boyfriend. Homecare PT continued up until [address] Was initially planning to start OPPT for cervical fusion sooner but "I wanted to be able to drive first." Prior to surgery primary complaint was severe central neck and bilateral UT pain - no significant weakness. Currently having difficulties with holding head up with walking and holding her head up in the shower. She also reports some stiffness with rotation while driving. She reports pain is currently well controlled with only tylenol. Overall she reports "I am pleased with my progress" but greatest concern is challenges with holding her head up. Quality of life: fair    Patient Goals  Patient goals for therapy: decreased pain and improved balance  Patient goal: "I want to hold my neck up better"   Pain  Current pain ratin  Location: Central upper cervical spine  Quality: dull ache    Treatments  Previous treatment: physical therapy    Objective:     Palpation:     Mild pain to minimal palpation along caudal end of incision  Incision intact and healing well     Posture:   Moderate increase in upper thoracic kyphosis, forward head  Constantly holding head up with hands, constantly moving head    ROM:       Cervical AROM   Flexion  31  Extension 26  R rotation 40  L rotation 32  R sidebend 10  L sidebend 14    Shoulder AROM  Left   Right  Shoulder flexion  160   160  Shoulder ER   WNL   WNL  Shoulder IR   WNL   WNL    All shoulder AROM pain free     Strength:       Deep Neck Flexors  Improving, poor    Shoulder FLX - 4/5 bilaterally  Biceps - 4/5 bilaterally   Triceps - 4/5 bilaterally   Wrist FLX/EXT - 4/5 bilaterally       strength WNL      Neural Screen:  ULTT1 and 2 - negative bilaterally     Reflexes:    Biceps     3+   3+  Brachioradialis   2+   2+  Triceps    2+   2+    Sensation:     WNL and no changes     Gait: Ambulating with SPC and fair overall steadiness, occasionally w/o SPC  TUG Alone: 17 seconds with SPC / 15 seconds with SPC  Transfers STS: only with moderate UE push        Precautions: Cervical Fusion 5/18/23, HTN, Type 2 DM, Chronic LBP, Fall Risk    HEP Provided 10/17, Review prior to D/C NV please. . kept minimal secondary to compliance issues    HEP: Cervical retraction with rotation, Wall slides FLX, Minimal upper cervical flexion in supine, Supine horizontal ABD    Manuals 9/6 9/15 9/20 9/22 10/17      STM                                                               Neuro Re-Ed               Wall Positioning                                Rows/Extension 2x15 12# / UL 2x15 12# UL 2x15 12# UL 2x15 12# UL 2x15 12# UL      Seated Bilateral ER 2x10 GTB 2x10 GTB 2x10 GTB 2x10 GTB 2x10 GTB      Supine Horizontal ABD 2x10 GTB 2x10 BTB 2x10 BTB 2x10 BTB 2x10 BTB      SL Thoracic Rot 10x 10x 10x 10x        Seated CT Junction    10x 10x 10x       Ther Ex               Seated Gentle Thoracic Ext 2x10 2x10 2x10 2x10 2x10      UBE 2x2 min 2x2 min 2x2 min 2x2 min 2x2 min      Cervical Iso ALL 10x3' 10x3' 10x3' 10x3'       Pulleys with Cervical Rot 2x1 min 2x1 min 2x1 min 2x1 min 2x1 min      Supine Conservative Cervical Rot 10x ea. 10x ea. 10x ea.  20x, with self OP and multiple pillow for increased cervical flexion  10x      Supine Minimal Upper Cervical FLX 10x 10x 10x 10x 10x      Supine Cervical Retr Iso 10x 3' 10x 3' 10x 3'         Seated Thoracic Rotation                Ther Activity               TM 2x7 min 2x7 min 2x7 min 2x7 min 2x7 min       Standing Shoulder FLX   x15 x15 x15 x15      Gait Training Modalities

## 2023-10-17 NOTE — PROGRESS NOTES
PT Evaluation     Today's date: 10/17/2023  Patient name: Keyana Fernandez  : 1955  MRN: [id number]  Referring provider: Amy Pedersen*  Dx:   Encounter Diagnosis     ICD-10-CM    1. Cervical radiculopathy  M54.12       2. S/P cervical spinal fusion  Z98.1                      Assessment  Assessment details: Problem List:  1) Cervical muscle weakness/motor control  2) Poor posture  3) decreased cervical AROM     Espinoza has made consistent improvement in upper thoracic/scapular muscle strength and motor control which has allowed for decreased frequency and intensity of increased cervical spine pain. Some persistent upper cervical muscle guarding contributing to AROM limitations with review of HEP today again to address. Appropriate for discharge to independent HEP NV. Comparable signs:  1) Time able to hold head up in standing/sitting without UE support   2) Cervical AROM Rot  Understanding of Dx/Px/POC: good   Prognosis: good    Goals  Pt will present with at least 45 degrees of cervical AROM rotation to decrease difficulty with driving in 4 weeks. Making progress  Pt will hold head up in static standing for 5 minutes without using UE for support in 4 weeks. Pt will ambulate for 20 minutes without using UE for head support in 8 weeks. Making progress  Pt will be independent with HEP in 8 weeks.  Making progress    Plan  Planned therapy interventions: manual therapy, therapeutic activities, therapeutic exercise, graded motor, graded exercise, graded activity, gait training, motor coordination training, therapeutic training and home exercise program  Frequency: 2x week  Duration in visits: 4  Duration in weeks: 3  Plan of Care beginning date: 10/13/2023  Plan of Care expiration date: 10/27/2023  Treatment plan discussed with: patient      Subjective Evaluation    History of Present Illness  Date of surgery: 2023  Mechanism of injury: Progress 10/17  Pt has made some progress with decreasing frequency and intensity of overall pain with most activities. Continued upper cervical muscle guarding and stiffness contributing to continued limitations with cervical rotation with HEP provided today to correct. Review of activities today as per patient request.  NV will be final visit secondary to upcoming lumbar surgery. Progress 9/1:    Overall Olga feels like she has made consistent and obvious progress with her overall neck pain up until this past Thursday where she woke up with severe pain, increasing Friday so much so that "I couldn't move my head." Severe pain leading to eventual rx of steroid. Some continued difficulty sleeping. Difficulty with holding head up has been completely eliminated. Mineral Claudia reports a long history of chronic lower back pain. Woke up April 1st with an insidious and severe increase in cervical spine pain. May 18th multilevel fusion (C4-C7?), discharged from hospital 2 days later. "As soon as I got home I couldn't move." 5 days of not being able to walk with "my legs dangling" causing a week in the hospital to manage LE ulcers. Discharged to home early June to home care PT. After a week of home care she reports significant improvements in completing all self care ADLs with some help from her boyfriend. Homecare PT continued up until [address]. Was initially planning to start OPPT for cervical fusion sooner but "I wanted to be able to drive first." Prior to surgery primary complaint was severe central neck and bilateral UT pain - no significant weakness. Currently having difficulties with holding head up with walking and holding her head up in the shower. She also reports some stiffness with rotation while driving. She reports pain is currently well controlled with only tylenol. Overall she reports "I am pleased with my progress" but greatest concern is challenges with holding her head up.    Quality of life: fair    Patient Goals  Patient goals for therapy: decreased pain and improved balance  Patient goal: "I want to hold my neck up better"   Pain  Current pain ratin  Location: Central upper cervical spine  Quality: dull ache    Treatments  Previous treatment: physical therapy    Objective:     Palpation:     Mild pain to minimal palpation along caudal end of incision  Incision intact and healing well     Posture: Moderate increase in upper thoracic kyphosis, forward head  Constantly holding head up with hands, constantly moving head    ROM:       Cervical AROM   Flexion  31  Extension 26  R rotation 40  L rotation 32  R sidebend 10  L sidebend 14    Shoulder AROM  Left   Right  Shoulder flexion  160   160  Shoulder ER   WNL   WNL  Shoulder IR   WNL   WNL    All shoulder AROM pain free     Strength:       Deep Neck Flexors  Improving, poor    Shoulder FLX - 4/5 bilaterally  Biceps - 4/5 bilaterally   Triceps - 4/5 bilaterally   Wrist FLX/EXT - 4/5 bilaterally       strength WNL      Neural Screen:  ULTT1 and 2 - negative bilaterally     Reflexes:    Biceps     3+   3+  Brachioradialis   2+   2+  Triceps    2+   2+    Sensation:     WNL and no changes     Gait: Ambulating with SPC and fair overall steadiness, occasionally w/o SPC  TUG Alone: 17 seconds with SPC / 15 seconds with SPC  Transfers STS: only with moderate UE push        Precautions: Cervical Fusion 23, HTN, Type 2 DM, Chronic LBP, Fall Risk    HEP Provided 10/17, Review prior to D/C NV please. . kept minimal secondary to compliance issues    HEP: Cervical retraction with rotation, Wall slides FLX, Minimal upper cervical flexion in supine, Supine horizontal ABD    Manuals 9/6 9/15 9/20 9/22 10/17      [facility]                                                               Neuro Re-Ed               Wall Positioning                                Rows/Extension 2x15 12# / UL 2x15 12# UL 2x15 12# UL 2x15 12# UL 2x15 12# UL      Seated Bilateral ER 2x10 GTB 2x10 GTB 2x10 GTB 2x10 GTB 2x10 GTB      Supine Horizontal ABD 2x10 GTB 2x10 BTB 2x10 BTB 2x10 BTB 2x10 BTB      SL Thoracic Rot 10x 10x 10x 10x        Seated CT Junction    10x 10x 10x       Ther Ex               Seated Gentle Thoracic Ext 2x10 2x10 2x10 2x10 2x10      UBE 2x2 min 2x2 min 2x2 min 2x2 min 2x2 min      Cervical Iso ALL 10x3' 10x3' 10x3' 10x3'       Pulleys with Cervical Rot 2x1 min 2x1 min 2x1 min 2x1 min 2x1 min      Supine Conservative Cervical Rot 10x ea. 10x ea. 10x ea.  20x, with self OP and multiple pillow for increased cervical flexion  10x      Supine Minimal Upper Cervical FLX 10x 10x 10x 10x 10x      Supine Cervical Retr Iso 10x 3' 10x 3' 10x 3'         Seated Thoracic Rotation                Ther Activity               TM 2x7 min 2x7 min 2x7 min 2x7 min 2x7 min       Standing Shoulder FLX   x15 x15 x15 x15      Gait Training                                               Modalities

## 2023-10-19 ENCOUNTER — APPOINTMENT (OUTPATIENT)
Dept: PHYSICAL THERAPY | Facility: CLINIC | Age: 68
End: 2023-10-19
Payer: MEDICARE

## 2023-10-20 ENCOUNTER — APPOINTMENT (OUTPATIENT)
Dept: PHYSICAL THERAPY | Facility: CLINIC | Age: 68
End: 2023-10-20
Payer: MEDICARE

## 2023-10-24 ENCOUNTER — APPOINTMENT (OUTPATIENT)
Dept: PHYSICAL THERAPY | Facility: CLINIC | Age: 68
End: 2023-10-24
Payer: MEDICARE

## 2023-10-25 ENCOUNTER — APPOINTMENT (OUTPATIENT)
Dept: PHYSICAL THERAPY | Facility: CLINIC | Age: 68
End: 2023-10-25
Payer: MEDICARE

## 2023-10-27 ENCOUNTER — APPOINTMENT (OUTPATIENT)
Dept: PHYSICAL THERAPY | Facility: CLINIC | Age: 68
End: 2023-10-27
Payer: MEDICARE

## 2023-10-31 ENCOUNTER — APPOINTMENT (OUTPATIENT)
Dept: PHYSICAL THERAPY | Facility: CLINIC | Age: 68
End: 2023-10-31
Payer: MEDICARE

## 2023-12-11 ENCOUNTER — EVALUATION (OUTPATIENT)
Dept: PHYSICAL THERAPY | Facility: CLINIC | Age: 68
End: 2023-12-11
Payer: MEDICARE

## 2023-12-11 DIAGNOSIS — M62.81 MUSCLE WEAKNESS (GENERALIZED): ICD-10-CM

## 2023-12-11 DIAGNOSIS — Z98.890 S/P LUMBAR LAMINECTOMY: Primary | ICD-10-CM

## 2023-12-11 DIAGNOSIS — Z91.81 HISTORY OF FALLING: ICD-10-CM

## 2023-12-11 DIAGNOSIS — R26.2 DIFFICULTY WALKING: ICD-10-CM

## 2023-12-11 PROCEDURE — 97110 THERAPEUTIC EXERCISES: CPT | Performed by: PHYSICAL THERAPIST

## 2023-12-11 PROCEDURE — 97162 PT EVAL MOD COMPLEX 30 MIN: CPT | Performed by: PHYSICAL THERAPIST

## 2023-12-11 PROCEDURE — 97530 THERAPEUTIC ACTIVITIES: CPT | Performed by: PHYSICAL THERAPIST

## 2023-12-11 NOTE — PROGRESS NOTES
PT Evaluation     Today's date: 2023  Patient name: Jonny Staley  : 1955  MRN: [id number]  Referring provider: Rziwana Styles*  Dx:   Encounter Diagnosis     ICD-10-CM    1. S/P lumbar laminectomy  Z98.890       2. Muscle weakness (generalized)  M62.81       3. Difficulty walking  R26.2       4. History of falling  Z91.81                      Assessment  Assessment details: Problem List:  1) Frequent falling  2) Decreased LE strength  3) Lumbar Movement Coordination Impairments    Jonny Staley is a pleasant 80 y.o. female who presents about 2 months s/p lumbar laminectomy with increasing concerns for persistent falling and LE weakness. She has decreased static and dynamic balance, some decreased LE strength, and decreased trunk AROM resulting in difficulty with stair negotiation, transfers, prolonged walking and activity, and contributing to her greatest concern of frequent falls. I expect she will progress slowly. Comparable signs:  1) Falls per week  2) Difficulty and assistance required with stair negotation  Understanding of Dx/Px/POC: good   Prognosis: good    Goals  Short Term Goals (4-6 weeks)  Pt will present with at least 50% increase in lumbar AROM all directions. Pt will demonstrate at least MCID improvement in FOTO. Long Term Goals (8-12 weeks)  Pt will independently manage symptoms successfully. Pt will present with 5/5 strength throughout the LE to decrease difficulty with all functional mobility tasks. Pt will exceed expected outcome in FOTO. Pt will be independent with comprehensive HEP.       Plan  Planned therapy interventions: abdominal trunk stabilization, ADL retraining, balance/weight bearing training, graded activity, graded exercise, home exercise program, therapeutic exercise, neuromuscular re-education and manual therapy  Frequency: 2x week  Duration in visits: 16  Duration in weeks: 8  Plan of Care beginning date: 2023  Plan of Care expiration date: 2024  Treatment plan discussed with: patient    Subjective Evaluation    History of Present Illness  Mechanism of injury: Olga reports a 10+ year history of chronic lower back with associated R>L LE weakness with occasional "giving way." Very infrequent LE giving way, once per month. No falls over this period. She denies significant pain in the LE, just persistent and worsening weakness. The majority of her pain she describes as "spasms" in her central lower back. Secondary to persistent pain and weakness, elective L2-3 decompression/laminectomy. Some challenges with recovery, kept in the hospital one day. When she got home, could not get up her stairs. 1 week after surgery was able to get up her stairs. Overall from there fairly typical recovery up until 2.5 weeks ago, insidious increase in weakness and pain. She began falling. She describes winding, challenging stairs to her second floor with no railing. First fall, trying to step up onto a small step. Second fall trying to descent a step into her garage. Third fall stepping down stairs going into her bathroom. Fourth fall while leaning over to  her cat, when going to stand up she reports "I kept going" and fell backwards. No significant injury from all the falls. She denies significant nausea, vomitting, dizziness since hitting her head - despite this was recommended to follow up with your PCP. Continues to not have any radicular pain, only weakness. Even the pain in the back is mild and improving. Her greatest concern now is persistent falling.    Patient Goals  Patient goal: improve balance, prevent falling  Pain  Current pain ratin  At best pain ratin  Location: Central lower LBP  Quality: dull ache    Social Support  Steps to enter house: yes  Stairs in house: yes   Lives in: multiple-level home  Lives with: significant other    Treatments  Current treatment: physical therapy    Observation:  Pain with bed mobility  Deferred observation of incision secondary to pt reports of WNL after check up    Posture:   Lumbar lordosis is decreased in standing. Thoracic kyphosis is mildly increased in standing.      ROM:    Lumbar AROM:  (*=  Pain)  Lumbar flexion: Max limitation  Lumbar extension: Max limitation  R side bending: Max limitation  L side bending: Max limitation  R rotation:  Mod limitation  L rotation:   Mod limitation  Severe muscle guarding and pain throughout    Hip PROM (degrees):  Deferred for now, pain    Strength:     Left  Right  Hip flexion  4/5  3-/5  Knee extension 5/5  4/5  Knee flexion  5/5  5/5  Ankle DF  4/5 4/5  Great toe ext  4/5 4/5    Neural:   Sensation  Mild decrease to LT lateral thigh R  Reflexes  2+ throughout LE except 1+ R patellar     Function:   Squat - STS from standard chair only with significant UE push, difficulty  Gait - with SPC, slow, unsteady  Step Up - 6" step only with significant UE, unsteady, contact guard / descent with inconsistent technique, unsafe    Feet Together Balance - 20+ seconds, contact guard  Feet Together EC - 5-10 seconds, contact guard to min A    Special tests:    Mild sciatic nerve mobility deficit L>R        Precautions: Cervical Fusion 5/18/23, HTN, Type 2 DM, Chronic LBP, Fall Risk, L2-L3 Laminectomy 10/16/23    Manuals 12/11            Lumbar STM                                                    Neuro Re-Ed             TA Brace 10x            TA March 10x                         Standing Rows/Extension                          Feet Together Balance 2x30 sec, CG            Semi-Tandem Stance 2x30 sec, CG            Ther Ex             LAQ Review            Seated Hip FLX                          Standing Hip FLX                                                                 Ther Activity             Mini-Squats             Step Ups NV, 6" bilateral UE FW                         Discussion of safety at home, review technique with SPC 10 min, fall prevention                         Modalities

## 2023-12-11 NOTE — LETTER
2023    Watson Bradley MD  [facility] [address]    Patient: Joseph Castro   YOB: 1955   Date of Visit: 2023     Encounter Diagnosis     ICD-10-CM    1. S/P lumbar laminectomy  Z98.890       2. Muscle weakness (generalized)  M62.81       3. Difficulty walking  R26.2       4. History of falling  Z91.81           Dear Dr. Marga Dailey: Thank you for your recent referral of Joseph Castro. Please review the attached evaluation summary from Rachael's recent visit. Please verify that you agree with the plan of care by signing the attached order. If you have any questions or concerns, please do not hesitate to call. I sincerely appreciate the opportunity to share in the care of one of your patients and hope to have another opportunity to work with you in the near future. Sincerely,    Alice Salazar, PT      Referring Provider:      I certify that I have read the below Plan of Care and certify the need for these services furnished under this plan of treatment while under my care. Watson Bradley MD  [facility] [address]  Via Fax: [phone]          PT Evaluation     Today's date: 2023  Patient name: Joseph Castro  : 1955  MRN: [id number]  Referring provider: Vladislav Aguilar*  Dx:   Encounter Diagnosis     ICD-10-CM    1. S/P lumbar laminectomy  Z98.890       2. Muscle weakness (generalized)  M62.81       3. Difficulty walking  R26.2       4. History of falling  Z91.81                      Assessment  Assessment details: Problem List:  1) Frequent falling  2) Decreased LE strength  3) Lumbar Movement Coordination Impairments    Joseph Castro is a pleasant 80 y.o. female who presents about 2 months s/p lumbar laminectomy with increasing concerns for persistent falling and LE weakness.   She has decreased static and dynamic balance, some decreased LE strength, and decreased trunk AROM resulting in difficulty with stair negotiation, transfers, prolonged walking and activity, and contributing to her greatest concern of frequent falls. I expect she will progress slowly. Comparable signs:  1) Falls per week  2) Difficulty and assistance required with stair negotation  Understanding of Dx/Px/POC: good   Prognosis: good    Goals  Short Term Goals (4-6 weeks)  Pt will present with at least 50% increase in lumbar AROM all directions. Pt will demonstrate at least MCID improvement in FOTO. Long Term Goals (8-12 weeks)  Pt will independently manage symptoms successfully. Pt will present with 5/5 strength throughout the LE to decrease difficulty with all functional mobility tasks. Pt will exceed expected outcome in FOTO. Pt will be independent with comprehensive HEP. Plan  Planned therapy interventions: abdominal trunk stabilization, ADL retraining, balance/weight bearing training, graded activity, graded exercise, home exercise program, therapeutic exercise, neuromuscular re-education and manual therapy  Frequency: 2x week  Duration in visits: 16  Duration in weeks: 8  Plan of Care beginning date: 12/11/2023  Plan of Care expiration date: 2/5/2024  Treatment plan discussed with: patient    Subjective Evaluation    History of Present Illness  Mechanism of injury: Meryl Lake reports a 10+ year history of chronic lower back with associated R>L LE weakness with occasional "giving way." Very infrequent LE giving way, once per month. No falls over this period. She denies significant pain in the LE, just persistent and worsening weakness. The majority of her pain she describes as "spasms" in her central lower back. Secondary to persistent pain and weakness, elective L2-3 decompression/laminectomy. Some challenges with recovery, kept in the hospital one day. When she got home, could not get up her stairs. 1 week after surgery was able to get up her stairs.   Overall from there fairly typical recovery up until 2.5 weeks ago, insidious increase in weakness and pain. She began falling. She describes winding, challenging stairs to her second floor with no railing. First fall, trying to step up onto a small step. Second fall trying to descent a step into her garage. Third fall stepping down stairs going into her bathroom. Fourth fall while leaning over to  her cat, when going to stand up she reports "I kept going" and fell backwards. No significant injury from all the falls. She denies significant nausea, vomitting, dizziness since hitting her head - despite this was recommended to follow up with your PCP. Continues to not have any radicular pain, only weakness. Even the pain in the back is mild and improving. Her greatest concern now is persistent falling. Patient Goals  Patient goal: improve balance, prevent falling  Pain  Current pain ratin  At best pain ratin  Location: Central lower LBP  Quality: dull ache    Social Support  Steps to enter house: yes  Stairs in house: yes   Lives in: multiple-level home  Lives with: significant other    Treatments  Current treatment: physical therapy    Observation:  Pain with bed mobility  Deferred observation of incision secondary to pt reports of WNL after check up    Posture:   Lumbar lordosis is decreased in standing. Thoracic kyphosis is mildly increased in standing.      ROM:    Lumbar AROM:  (*=  Pain)  Lumbar flexion: Max limitation  Lumbar extension: Max limitation  R side bending: Max limitation  L side bending: Max limitation  R rotation:  Mod limitation  L rotation:   Mod limitation  Severe muscle guarding and pain throughout    Hip PROM (degrees):  Deferred for now, pain    Strength:     Left  Right  Hip flexion  4/5  3-/5  Knee extension 5/5  4/5  Knee flexion  5/5  5/5  Ankle DF  4/5  4/5  Great toe ext  4/5  4/5    Neural:   Sensation  Mild decrease to LT lateral thigh R  Reflexes  2+ throughout LE except 1+ R patellar Function:   Squat - STS from standard chair only with significant UE push, difficulty  Gait - with SPC, slow, unsteady  Step Up - 6" step only with significant UE, unsteady, contact guard / descent with inconsistent technique, unsafe    Feet Together Balance - 20+ seconds, contact guard  Feet Together EC - 5-10 seconds, contact guard to min A    Special tests:    Mild sciatic nerve mobility deficit L>R        Precautions: Cervical Fusion 5/18/23, HTN, Type 2 DM, Chronic LBP, Fall Risk, L2-L3 Laminectomy 10/16/23    Manuals 12/11            Lumbar STM                                                    Neuro Re-Ed             TA Brace 10x            TA March 10x                         Standing Rows/Extension                          Feet Together Balance 2x30 sec, CG            Semi-Tandem Stance 2x30 sec, CG            Ther Ex             LAQ Review            Seated Hip FLX                          Standing Hip FLX                                                                 Ther Activity             Mini-Squats             Step Ups NV, 6" bilateral UE FW                         Discussion of safety at home, review technique with SPC 10 min, fall prevention                         Modalities

## 2023-12-13 ENCOUNTER — APPOINTMENT (OUTPATIENT)
Dept: PHYSICAL THERAPY | Facility: CLINIC | Age: 68
End: 2023-12-13
Payer: MEDICARE

## 2023-12-18 ENCOUNTER — OFFICE VISIT (OUTPATIENT)
Dept: PHYSICAL THERAPY | Facility: CLINIC | Age: 68
End: 2023-12-18
Payer: MEDICARE

## 2023-12-18 DIAGNOSIS — R26.2 DIFFICULTY WALKING: ICD-10-CM

## 2023-12-18 DIAGNOSIS — Z91.81 HISTORY OF FALLING: ICD-10-CM

## 2023-12-18 DIAGNOSIS — Z98.890 S/P LUMBAR LAMINECTOMY: ICD-10-CM

## 2023-12-18 DIAGNOSIS — M62.81 MUSCLE WEAKNESS (GENERALIZED): Primary | ICD-10-CM

## 2023-12-18 PROCEDURE — 97530 THERAPEUTIC ACTIVITIES: CPT | Performed by: PHYSICAL THERAPIST

## 2023-12-18 PROCEDURE — 97112 NEUROMUSCULAR REEDUCATION: CPT | Performed by: PHYSICAL THERAPIST

## 2023-12-18 PROCEDURE — 97110 THERAPEUTIC EXERCISES: CPT | Performed by: PHYSICAL THERAPIST

## 2023-12-18 NOTE — PROGRESS NOTES
"Daily Note     Today's date: 2023  Patient name: Rachael Marcelo  : 1955  MRN: [id number]  Referring provider: Guido Madera*  Dx:   Encounter Diagnosis     ICD-10-CM    1. Muscle weakness (generalized)  M62.81       2. S/P lumbar laminectomy  Z98.890       3. Difficulty walking  R26.2       4. History of falling  Z91.81                      Subjective: Olga reports no falls since previous session.  Continues to have trouble with stairs but feels maybe she has been slightly stronger.       Objective: See treatment diary below      Assessment: Tolerated treatment well. Patient demonstrated fatigue post treatment.  Emphasis on balance, LE strength, fall prevention, and conservative core activation.  Some continued deficit in hip flexion strength R related to surgery with some isolated strengthening and muscle activation here as well.        Plan: Continue per plan of care.      Precautions: Cervical Fusion 23, HTN, Type 2 DM, Chronic LBP, Fall Risk, L2-L3 Laminectomy 10/16/23    Manuals            Lumbar STM                                                    Neuro Re-Ed             TA Brace 10x 10x, 3 sec           TA March 10x 2x10, 3 sec                        Standing Rows/Extension  2x20, 12.5#                        Feet Together Balance 2x30 sec, CG 2x30 sec CG           Semi-Tandem Stance 2x30 sec, CG 2x30 sec CG           Ther Ex             LAQ Review 30x, 5#           Seated Hip FLX                          Standing Hip FLX  2x10, with UE and TA                                     TM  5 min, bilat UE           UBE  3x3           Ther Activity             Mini-Squats             Step Ups NV, 6\" bilateral UE FW 2x10 L 6\" bilat UE FW / 2x10 R 4\" bilat UE FW                        Discussion of safety at home, review technique with SPC 10 min, fall prevention                         Modalities                                            "

## 2023-12-20 ENCOUNTER — OFFICE VISIT (OUTPATIENT)
Dept: PHYSICAL THERAPY | Facility: CLINIC | Age: 68
End: 2023-12-20
Payer: MEDICARE

## 2023-12-20 DIAGNOSIS — Z98.890 S/P LUMBAR LAMINECTOMY: ICD-10-CM

## 2023-12-20 DIAGNOSIS — R26.2 DIFFICULTY WALKING: ICD-10-CM

## 2023-12-20 DIAGNOSIS — Z91.81 HISTORY OF FALLING: ICD-10-CM

## 2023-12-20 DIAGNOSIS — M62.81 MUSCLE WEAKNESS (GENERALIZED): Primary | ICD-10-CM

## 2023-12-20 PROCEDURE — 97112 NEUROMUSCULAR REEDUCATION: CPT | Performed by: PHYSICAL THERAPIST

## 2023-12-20 PROCEDURE — 97110 THERAPEUTIC EXERCISES: CPT | Performed by: PHYSICAL THERAPIST

## 2023-12-20 NOTE — PROGRESS NOTES
"Daily Note     Today's date: 2023  Patient name: Rachael Marcelo  : 1955  MRN: [id number]  Referring provider: Guido Madera*  Dx:   Encounter Diagnosis     ICD-10-CM    1. Muscle weakness (generalized)  M62.81       2. S/P lumbar laminectomy  Z98.890       3. Difficulty walking  R26.2       4. History of falling  Z91.81                      Subjective: Olga reports one incidence of R LE giving way but did not fall.  Continues to report minimal to no pain.       Objective: See treatment diary below      Assessment: Continued current program with emphasis on R quad activation and general LE strength/balance to decrease fall risk. Gradual progression secondary to excellent tolerance of LV.       Plan: Continue per plan of care.      Precautions: Cervical Fusion 23, HTN, Type 2 DM, Chronic LBP, Fall Risk, L2-L3 Laminectomy 10/16/23    Manuals           Lumbar STM                                                    Neuro Re-Ed             TA Brace 10x 10x, 3 sec 10x, 3 sec seated          TA March 10x 2x10, 3 sec                        Standing Rows/Extension  2x20, 12.5# 2x20 12.5#                       Feet Together Balance 2x30 sec, CG 2x30 sec CG 2x30 sec CG          Semi-Tandem Stance 2x30 sec, CG 2x30 sec CG 2x30 sec CG          Ther Ex             LAQ Review 30x, 5# 30x, 7.5#          Seated Hip FLX                          Standing Hip FLX  2x10, with UE and TA 2x10, with UE and TA                                    TM  5 min, bilat UE 5 min bilat UE          UBE  3x3 3x3          Ther Activity             Mini-Squats   20x bilateral, 10x ea. Staggered from chair with 2 foams          Step Ups NV, 6\" bilateral UE FW 2x10 L 6\" bilat UE FW / 2x10 R 4\" bilat UE FW 2x10 L 6\" bilat UE FW / 2x10 R 4\" bilat UE FW                       Discussion of safety at home, review technique with SPC 10 min, fall prevention                         Modalities                                "

## 2023-12-26 ENCOUNTER — OFFICE VISIT (OUTPATIENT)
Dept: PHYSICAL THERAPY | Facility: CLINIC | Age: 68
End: 2023-12-26
Payer: MEDICARE

## 2023-12-26 DIAGNOSIS — R26.2 DIFFICULTY WALKING: ICD-10-CM

## 2023-12-26 DIAGNOSIS — Z91.81 HISTORY OF FALLING: ICD-10-CM

## 2023-12-26 DIAGNOSIS — Z98.890 S/P LUMBAR LAMINECTOMY: ICD-10-CM

## 2023-12-26 DIAGNOSIS — M62.81 MUSCLE WEAKNESS (GENERALIZED): Primary | ICD-10-CM

## 2023-12-26 PROCEDURE — 97110 THERAPEUTIC EXERCISES: CPT | Performed by: PHYSICAL THERAPIST

## 2023-12-26 PROCEDURE — 97112 NEUROMUSCULAR REEDUCATION: CPT | Performed by: PHYSICAL THERAPIST

## 2023-12-26 NOTE — PROGRESS NOTES
"Daily Note     Today's date: 2023  Patient name: Rachael Marcelo  : 1955  MRN: [id number]  Referring provider: Guido Madera  Dx:   Encounter Diagnosis     ICD-10-CM    1. Muscle weakness (generalized)  M62.81       2. S/P lumbar laminectomy  Z98.890       3. Difficulty walking  R26.2       4. History of falling  Z91.81                        Subjective: Olga reports one incidence of R LE giving way but did not fall.  Continues to report minimal to no pain.       Objective: See treatment diary below      Assessment: Continued current program with emphasis on R quad activation and general LE strength/balance to decrease fall risk. Gradual progression secondary to excellent tolerance of LV.       Plan: Continue per plan of care.      Precautions: Cervical Fusion 23, HTN, Type 2 DM, Chronic LBP, Fall Risk, L2-L3 Laminectomy 10/16/23    Manuals          Lumbar STM                                                    Neuro Re-Ed             TA Brace 10x 10x, 3 sec 10x, 3 sec seated 10x, 3 sec seated         TA March 10x 2x10, 3 sec                        Standing Rows/Extension  2x20, 12.5# 2x20 12.5# 2x20 12.5#                      Feet Together Balance 2x30 sec, CG 2x30 sec CG 2x30 sec CG 2x30 sec CG         Semi-Tandem Stance 2x30 sec, CG 2x30 sec CG 2x30 sec CG 2x30 sec CG         Ther Ex             LAQ Review 30x, 5# 30x, 7.5# 30x 7.5#         Seated Hip FLX                          Standing Hip FLX  2x10, with UE and TA 2x10, with UE and TA 2x10 with UE and TA         LP    3x10, 30#                      TM  5 min, bilat UE 5 min bilat UE 5 min bilat UE         UBE  3x3 3x3 3x3         Ther Activity             Mini-Squats   20x bilateral, 10x ea. Staggered from chair with 2 foams 20x bilateral, 10x ea. Staggered from chair with 2 foams         Step Ups NV, 6\" bilateral UE FW 2x10 L 6\" bilat UE FW / 2x10 R 4\" bilat UE FW 2x10 L 6\" bilat UE FW / 2x10 R 4\" bilat UE FW " "2x10 L 6\" collin MEJIA / 2x10 R 4\" collin MEJIA                      Discussion of safety at home, review technique with SPC 10 min, fall prevention                         Modalities                                              "

## 2023-12-28 ENCOUNTER — OFFICE VISIT (OUTPATIENT)
Dept: PHYSICAL THERAPY | Facility: CLINIC | Age: 68
End: 2023-12-28
Payer: MEDICARE

## 2023-12-28 DIAGNOSIS — Z91.81 HISTORY OF FALLING: ICD-10-CM

## 2023-12-28 DIAGNOSIS — R26.2 DIFFICULTY WALKING: ICD-10-CM

## 2023-12-28 DIAGNOSIS — Z98.890 S/P LUMBAR LAMINECTOMY: ICD-10-CM

## 2023-12-28 DIAGNOSIS — M62.81 MUSCLE WEAKNESS (GENERALIZED): Primary | ICD-10-CM

## 2023-12-28 PROCEDURE — 97110 THERAPEUTIC EXERCISES: CPT | Performed by: PHYSICAL THERAPIST

## 2023-12-28 PROCEDURE — 97530 THERAPEUTIC ACTIVITIES: CPT | Performed by: PHYSICAL THERAPIST

## 2023-12-28 PROCEDURE — 97112 NEUROMUSCULAR REEDUCATION: CPT | Performed by: PHYSICAL THERAPIST

## 2023-12-28 NOTE — PROGRESS NOTES
"Daily Note     Today's date: 2023  Patient name: Rachael Marcelo  : 1955  MRN: [id number]  Referring provider: Guido Madera  Dx:   Encounter Diagnosis     ICD-10-CM    1. Muscle weakness (generalized)  M62.81       2. S/P lumbar laminectomy  Z98.890       3. Difficulty walking  R26.2       4. History of falling  Z91.81                          Subjective: No incidence of falls.  Feels the program has been effective so far.        Objective: See treatment diary below      Assessment:  Continued graded progression in strengthening and balance activities, all tolerated well. Continued emphasis on quadriceps strengthening, balance.       Plan: Continue per plan of care.      Precautions: Cervical Fusion 23, HTN, Type 2 DM, Chronic LBP, Fall Risk, L2-L3 Laminectomy 10/16/23    Manuals         Lumbar STM                                                    Neuro Re-Ed             TA Brace 10x 10x, 3 sec 10x, 3 sec seated 10x, 3 sec seated         TA March 10x 2x10, 3 sec                        Standing Rows/Extension  2x20, 12.5# 2x20 12.5# 2x20 12.5# 2x20 12.5#                     Feet Together Balance 2x30 sec, CG 2x30 sec CG 2x30 sec CG 2x30 sec CG 2x30 sec Foam EC        Semi-Tandem Stance 2x30 sec, CG 2x30 sec CG 2x30 sec CG 2x30 sec CG 2x30 sec EC        Ther Ex             LAQ Review 30x, 5# 30x, 7.5# 30x 7.5# 2x30 10#        Seated Hip FLX                          Standing Hip FLX  2x10, with UE and TA 2x10, with UE and TA 2x10 with UE and TA 2x10 with UE and TA        LP    3x10, 30# 3x10, 35#                     TM  5 min, bilat UE 5 min bilat UE 5 min bilat UE 5 min bilat UE         UBE  3x3 3x3 3x3         Ther Activity             Mini-Squats   20x bilateral, 10x ea. Staggered from chair with 2 foams 20x bilateral, 10x ea. Staggered from chair with 2 foams 2x15 from chair, staggered x10 ea.        Step Ups NV, 6\" bilateral UE FW 2x10 L 6\" bilat UE FW / " "2x10 R 4\" bilat UE FW 2x10 L 6\" bilat UE FW / 2x10 R 4\" bilat UE FW 2x10 L 6\" bilat UE FW / 2x10 R 4\" bilat UE FW 2x10 8\" contra UE support / 2x10 4\" no UE                     Discussion of safety at home, review technique with SPC 10 min, fall prevention                         Modalities                                              "

## 2023-12-29 ENCOUNTER — APPOINTMENT (OUTPATIENT)
Dept: PHYSICAL THERAPY | Facility: CLINIC | Age: 68
End: 2023-12-29
Payer: MEDICARE

## 2024-01-02 ENCOUNTER — OFFICE VISIT (OUTPATIENT)
Dept: PHYSICAL THERAPY | Facility: CLINIC | Age: 69
End: 2024-01-02
Payer: MEDICARE

## 2024-01-02 DIAGNOSIS — Z91.81 HISTORY OF FALLING: ICD-10-CM

## 2024-01-02 DIAGNOSIS — Z98.890 S/P LUMBAR LAMINECTOMY: ICD-10-CM

## 2024-01-02 DIAGNOSIS — M62.81 MUSCLE WEAKNESS (GENERALIZED): Primary | ICD-10-CM

## 2024-01-02 DIAGNOSIS — R26.2 DIFFICULTY WALKING: ICD-10-CM

## 2024-01-02 PROCEDURE — 97112 NEUROMUSCULAR REEDUCATION: CPT | Performed by: PHYSICAL THERAPIST

## 2024-01-02 PROCEDURE — 97110 THERAPEUTIC EXERCISES: CPT | Performed by: PHYSICAL THERAPIST

## 2024-01-02 NOTE — PROGRESS NOTES
Daily Note     Today's date: 2024  Patient name: Rachael Marcelo  : 1955  MRN: [id number]  Referring provider: Guido Madera*  Dx:   Encounter Diagnosis     ICD-10-CM    1. Muscle weakness (generalized)  M62.81       2. S/P lumbar laminectomy  Z98.890       3. Difficulty walking  R26.2       4. History of falling  Z91.81                            Subjective: One near fall this morning with L LE near buckling.  Other than this has been doing well.       Objective: See treatment diary below      Assessment:  Continued current program with emphasis on closed chain quadriceps strength and overall balance - all tolerated well.       Plan: Continue per plan of care.      Precautions: Cervical Fusion 23, HTN, Type 2 DM, Chronic LBP, Fall Risk, L2-L3 Laminectomy 10/16/23    Manuals /       Lumbar STM                                                    Neuro Re-Ed             TA Brace 10x 10x, 3 sec 10x, 3 sec seated 10x, 3 sec seated  10x 3 sec seated       TA March 10x 2x10, 3 sec                        Standing Rows/Extension  2x20, 12.5# 2x20 12.5# 2x20 12.5# 2x20 12.5# 2x20 12.5#                    Feet Together Balance 2x30 sec, CG 2x30 sec CG 2x30 sec CG 2x30 sec CG 2x30 sec Foam EC 2x30 sec foam EC       Semi-Tandem Stance 2x30 sec, CG 2x30 sec CG 2x30 sec CG 2x30 sec CG 2x30 sec EC 2x30 sec EC       Ther Ex             LAQ Review 30x, 5# 30x, 7.5# 30x 7.5# 2x30 10# 2x20 12#       Seated Hip FLX                          Standing Hip FLX  2x10, with UE and TA 2x10, with UE and TA 2x10 with UE and TA 2x10 with UE and TA 2x10 with UE and TA       LP    3x10, 30# 3x10, 35# 3x10 35#                    TM  5 min, bilat UE 5 min bilat UE 5 min bilat UE 5 min bilat UE  7 min bilat UE       UBE  3x3 3x3 3x3         Ther Activity             Mini-Squats   20x bilateral, 10x ea. Staggered from chair with 2 foams 20x bilateral, 10x ea. Staggered from chair with 2 foams  "2x15 from chair, staggered x10 ea. 2x15 from chair, staggered x10 ea.       Step Ups NV, 6\" bilateral UE FW 2x10 L 6\" bilat UE FW / 2x10 R 4\" bilat UE FW 2x10 L 6\" bilat UE FW / 2x10 R 4\" bilat UE FW 2x10 L 6\" bilat UE FW / 2x10 R 4\" bilat UE FW 2x10 8\" contra UE support / 2x10 4\" no UE 2x10 8\" contra UE support / 2x10 4\" no UE       Step Downs      10x ea. With UE 4\"       Discussion of safety at home, review technique with SPC 10 min, fall prevention                         Modalities                                              "

## 2024-01-05 ENCOUNTER — APPOINTMENT (OUTPATIENT)
Dept: PHYSICAL THERAPY | Facility: CLINIC | Age: 69
End: 2024-01-05
Payer: MEDICARE

## 2024-01-08 ENCOUNTER — OFFICE VISIT (OUTPATIENT)
Dept: PHYSICAL THERAPY | Facility: CLINIC | Age: 69
End: 2024-01-08
Payer: MEDICARE

## 2024-01-08 DIAGNOSIS — Z91.81 HISTORY OF FALLING: ICD-10-CM

## 2024-01-08 DIAGNOSIS — M62.81 MUSCLE WEAKNESS (GENERALIZED): Primary | ICD-10-CM

## 2024-01-08 DIAGNOSIS — Z98.890 S/P LUMBAR LAMINECTOMY: ICD-10-CM

## 2024-01-08 DIAGNOSIS — R26.2 DIFFICULTY WALKING: ICD-10-CM

## 2024-01-08 PROCEDURE — 97112 NEUROMUSCULAR REEDUCATION: CPT | Performed by: PHYSICAL THERAPIST

## 2024-01-08 PROCEDURE — 97530 THERAPEUTIC ACTIVITIES: CPT | Performed by: PHYSICAL THERAPIST

## 2024-01-08 PROCEDURE — 97110 THERAPEUTIC EXERCISES: CPT | Performed by: PHYSICAL THERAPIST

## 2024-01-08 NOTE — PROGRESS NOTES
Daily Note     Today's date: 2024  Patient name: Rachael Marcelo  : 1955  MRN: [id number]  Referring provider: Guido Madera*  Dx:   Encounter Diagnosis     ICD-10-CM    1. Muscle weakness (generalized)  M62.81       2. S/P lumbar laminectomy  Z98.890       3. Difficulty walking  R26.2       4. History of falling  Z91.81                              Subjective: Olga isn't feeling great today. Obvious increase in fatigue and congestion in the last few days.  Also some dizziness.  Feels it is just her sinuses.        Objective: See treatment diary below      Assessment:  Decreased intensity of activities today related to some increased fatigue and dizziness. Multiple vitals checks all with elevated BP with suggestion of tracking BP over the next few days.  To report to primary if sx worsen or with concern BP.       Plan: Continue per plan of care.      Precautions: Cervical Fusion 23, HTN, Type 2 DM, Chronic LBP, Fall Risk, L2-L3 Laminectomy 10/16/23    Manuals       Lumbar STM                                                    Neuro Re-Ed             TA Brace 10x 10x, 3 sec 10x, 3 sec seated 10x, 3 sec seated  10x 3 sec seated       TA March 10x 2x10, 3 sec                        Standing Rows/Extension  2x20, 12.5# 2x20 12.5# 2x20 12.5# 2x20 12.5# 2x20 12.5# 2x20 12.5#                   Feet Together Balance 2x30 sec, CG 2x30 sec CG 2x30 sec CG 2x30 sec CG 2x30 sec Foam EC 2x30 sec foam EC 2x30 sec foam EC      Semi-Tandem Stance 2x30 sec, CG 2x30 sec CG 2x30 sec CG 2x30 sec CG 2x30 sec EC 2x30 sec EC 2x30 sec EC      Ther Ex             LAQ Review 30x, 5# 30x, 7.5# 30x 7.5# 2x30 10# 2x20 12# 2x20 5# 12#     Seated Hip FLX                          Standing Hip FLX  2x10, with UE and TA 2x10, with UE and TA 2x10 with UE and TA 2x10 with UE and TA 2x10 with UE and TA 2x10 with UE and TA      LP    3x10, 30# 3x10, 35# 3x10 35#                    TM  5 min,  "bilat UE 5 min bilat UE 5 min bilat UE 5 min bilat UE  7 min bilat UE 7 min bilat UE      UBE  3x3 3x3 3x3   3x3      Ther Activity             Mini-Squats   20x bilateral, 10x ea. Staggered from chair with 2 foams 20x bilateral, 10x ea. Staggered from chair with 2 foams 2x15 from chair, staggered x10 ea. 2x15 from chair, staggered x10 ea. 2x15 from chair, staggered x10 ea.      Step Ups NV, 6\" bilateral UE FW 2x10 L 6\" bilat UE FW / 2x10 R 4\" bilat UE FW 2x10 L 6\" bilat UE FW / 2x10 R 4\" bilat UE FW 2x10 L 6\" bilat UE FW / 2x10 R 4\" bilat UE FW 2x10 8\" contra UE support / 2x10 4\" no UE 2x10 8\" contra UE support / 2x10 4\" no UE 2x10 8\" contra UE support / 2x10 4\" no UE      Step Downs      10x ea. With UE 4\"       Discussion of safety at home, review technique with SPC 10 min, fall prevention                         Modalities                                              "

## 2024-01-10 ENCOUNTER — APPOINTMENT (OUTPATIENT)
Dept: PHYSICAL THERAPY | Facility: CLINIC | Age: 69
End: 2024-01-10
Payer: MEDICARE

## 2024-01-15 ENCOUNTER — OFFICE VISIT (OUTPATIENT)
Dept: PHYSICAL THERAPY | Facility: CLINIC | Age: 69
End: 2024-01-15
Payer: MEDICARE

## 2024-01-15 DIAGNOSIS — Z91.81 HISTORY OF FALLING: ICD-10-CM

## 2024-01-15 DIAGNOSIS — R26.2 DIFFICULTY WALKING: ICD-10-CM

## 2024-01-15 DIAGNOSIS — Z98.890 S/P LUMBAR LAMINECTOMY: ICD-10-CM

## 2024-01-15 DIAGNOSIS — M62.81 MUSCLE WEAKNESS (GENERALIZED): Primary | ICD-10-CM

## 2024-01-15 PROCEDURE — 97110 THERAPEUTIC EXERCISES: CPT | Performed by: PHYSICAL THERAPIST

## 2024-01-15 PROCEDURE — 97112 NEUROMUSCULAR REEDUCATION: CPT | Performed by: PHYSICAL THERAPIST

## 2024-01-15 NOTE — PROGRESS NOTES
Daily Note     Today's date: 1/15/2024  Patient name: Rachael Marcelo  : 1955  MRN: [id number]  Referring provider: Guido Madera*  Dx:   Encounter Diagnosis     ICD-10-CM    1. Muscle weakness (generalized)  M62.81       2. S/P lumbar laminectomy  Z98.890       3. Difficulty walking  R26.2       4. History of falling  Z91.81                                Subjective: Olga is feeling much better.  Some unsteadiness going down the stairs this morning.       Objective: See treatment diary below      Assessment:  Returning to previous intensity today which was tolerated extremely well.  Emphasis on quadriceps activation, balance and general strength to decrease risk of falls from all ambulation.       Plan: Continue per plan of care.      Precautions: Cervical Fusion 23, HTN, Type 2 DM, Chronic LBP, Fall Risk, L2-L3 Laminectomy 10/16/23    Manuals 12/11 12/18 12/20 12/26 12/28 1/2 1/8 1/15     Lumbar STM                                                    Neuro Re-Ed             TA Brace 10x 10x, 3 sec 10x, 3 sec seated 10x, 3 sec seated  10x 3 sec seated       TA March 10x 2x10, 3 sec                        Standing Rows/Extension  2x20, 12.5# 2x20 12.5# 2x20 12.5# 2x20 12.5# 2x20 12.5# 2x20 12.5#                   Feet Together Balance 2x30 sec, CG 2x30 sec CG 2x30 sec CG 2x30 sec CG 2x30 sec Foam EC 2x30 sec foam EC 2x30 sec foam EC 2x30 sec foam EC     Semi-Tandem Stance 2x30 sec, CG 2x30 sec CG 2x30 sec CG 2x30 sec CG 2x30 sec EC 2x30 sec EC 2x30 sec EC 2x30 sec EC     Ther Ex             LAQ Review 30x, 5# 30x, 7.5# 30x 7.5# 2x30 10# 2x20 12# 2x20 5# 2x30 15#     Seated Hip FLX                          Standing Hip FLX  2x10, with UE and TA 2x10, with UE and TA 2x10 with UE and TA 2x10 with UE and TA 2x10 with UE and TA 2x10 with UE and TA      LP    3x10, 30# 3x10, 35# 3x10 35#                    TM  5 min, bilat UE 5 min bilat UE 5 min bilat UE 5 min bilat UE  7 min bilat UE 7 min bilat UE 12  "min bilat UE     UBE  3x3 3x3 3x3   3x3 6x6     Ther Activity             Mini-Squats   20x bilateral, 10x ea. Staggered from chair with 2 foams 20x bilateral, 10x ea. Staggered from chair with 2 foams 2x15 from chair, staggered x10 ea. 2x15 from chair, staggered x10 ea. 2x15 from chair, staggered x10 ea. 2x15 from chair, staggered x10 ea.     Step Ups NV, 6\" bilateral UE FW 2x10 L 6\" bilat UE FW / 2x10 R 4\" bilat UE FW 2x10 L 6\" bilat UE FW / 2x10 R 4\" bilat UE FW 2x10 L 6\" bilat UE FW / 2x10 R 4\" bilat UE FW 2x10 8\" contra UE support / 2x10 4\" no UE 2x10 8\" contra UE support / 2x10 4\" no UE 2x10 8\" contra UE support / 2x10 4\" no UE 2x10 8\" contra UE support / 2x10 6\" no UE     Step Downs      10x ea. With UE 4\"  10x ea with UE 4\"     Discussion of safety at home, review technique with SPC 10 min, fall prevention                         Modalities                                              "

## 2024-01-17 ENCOUNTER — OFFICE VISIT (OUTPATIENT)
Dept: PHYSICAL THERAPY | Facility: CLINIC | Age: 69
End: 2024-01-17
Payer: MEDICARE

## 2024-01-17 DIAGNOSIS — Z98.890 S/P LUMBAR LAMINECTOMY: ICD-10-CM

## 2024-01-17 DIAGNOSIS — M62.81 MUSCLE WEAKNESS (GENERALIZED): Primary | ICD-10-CM

## 2024-01-17 DIAGNOSIS — R26.2 DIFFICULTY WALKING: ICD-10-CM

## 2024-01-17 DIAGNOSIS — Z91.81 HISTORY OF FALLING: ICD-10-CM

## 2024-01-17 PROCEDURE — 97112 NEUROMUSCULAR REEDUCATION: CPT | Performed by: PHYSICAL THERAPIST

## 2024-01-17 PROCEDURE — 97110 THERAPEUTIC EXERCISES: CPT | Performed by: PHYSICAL THERAPIST

## 2024-01-17 NOTE — PROGRESS NOTES
Daily Note     Today's date: 2024  Patient name: Rachael Marcelo  : 1955  MRN: [id number]  Referring provider: Guido Madera  Dx:   Encounter Diagnosis     ICD-10-CM    1. Muscle weakness (generalized)  M62.81       2. S/P lumbar laminectomy  Z98.890       3. Difficulty walking  R26.2       4. History of falling  Z91.81                                  Subjective: Continued reports of consistent improvements in LE strength.  No falls.  Feels her balance is improving.        Objective: See treatment diary below      Assessment:  Progression in activities as tolerated.  Obvious and consistent improvements noted with quadriceps strength.  Some progression in squatting from a deficit secondary to patient reports of difficulty with deep squatting.       Plan: Continue per plan of care.      Precautions: Cervical Fusion 23, HTN, Type 2 DM, Chronic LBP, Fall Risk, L2-L3 Laminectomy 10/16/23    Manuals 12/11 12/18 12/20 12/26 12/28 1/2 1/8 1/15 1/17    Lumbar STM                                                    Neuro Re-Ed             TA Brace 10x 10x, 3 sec 10x, 3 sec seated 10x, 3 sec seated  10x 3 sec seated       TA March 10x 2x10, 3 sec                        Standing Rows/Extension  2x20, 12.5# 2x20 12.5# 2x20 12.5# 2x20 12.5# 2x20 12.5# 2x20 12.5#  2x20 12.5#                 Feet Together Balance 2x30 sec, CG 2x30 sec CG 2x30 sec CG 2x30 sec CG 2x30 sec Foam EC 2x30 sec foam EC 2x30 sec foam EC 2x30 sec foam EC 2x30 sec foam EC    Semi-Tandem Stance 2x30 sec, CG 2x30 sec CG 2x30 sec CG 2x30 sec CG 2x30 sec EC 2x30 sec EC 2x30 sec EC 2x30 sec EC 2x30 sec EC    Ther Ex             LAQ Review 30x, 5# 30x, 7.5# 30x 7.5# 2x30 10# 2x20 12# 2x20 5# 2x30 15#     Seated Hip FLX                          Standing Hip FLX  2x10, with UE and TA 2x10, with UE and TA 2x10 with UE and TA 2x10 with UE and TA 2x10 with UE and TA 2x10 with UE and TA  210 with UE and TA    LP    3x10, 30# 3x10, 35# 3x10 35#   " 3x10 50#                 TM  5 min, bilat UE 5 min bilat UE 5 min bilat UE 5 min bilat UE  7 min bilat UE 7 min bilat UE 12 min bilat UE 12 min bilat UE    UBE  3x3 3x3 3x3   3x3 6x6 6x6    Ther Activity             Mini-Squats   20x bilateral, 10x ea. Staggered from chair with 2 foams 20x bilateral, 10x ea. Staggered from chair with 2 foams 2x15 from chair, staggered x10 ea. 2x15 from chair, staggered x10 ea. 2x15 from chair, staggered x10 ea. 2x15 from chair, staggered x10 ea. 2x15 from chair, 10x from 4\" defecit with UE    Step Ups NV, 6\" bilateral UE FW 2x10 L 6\" bilat UE FW / 2x10 R 4\" bilat UE FW 2x10 L 6\" bilat UE FW / 2x10 R 4\" bilat UE FW 2x10 L 6\" bilat UE FW / 2x10 R 4\" bilat UE FW 2x10 8\" contra UE support / 2x10 4\" no UE 2x10 8\" contra UE support / 2x10 4\" no UE 2x10 8\" contra UE support / 2x10 4\" no UE 2x10 8\" contra UE support / 2x10 6\" no UE 2x10 8\" contra UE support / 2x10 6\" no UE    Step Downs      10x ea. With UE 4\"  10x ea with UE 4\" 10x ea with UE 4\"    Discussion of safety at home, review technique with SPC 10 min, fall prevention                         Modalities                                              "

## 2024-01-22 ENCOUNTER — APPOINTMENT (OUTPATIENT)
Dept: PHYSICAL THERAPY | Facility: CLINIC | Age: 69
End: 2024-01-22
Payer: MEDICARE

## 2024-01-22 NOTE — PROGRESS NOTES
"Daily Note     Today's date: 2024  Patient name: Rachael Marcelo  : 1955  MRN: [id number]  Referring provider: Guido Madera*  Dx:   No diagnosis found.                             Subjective: Continued reports of consistent improvements in LE strength.  No falls.  Feels her balance is improving.        Objective: See treatment diary below      Assessment:  Progression in activities as tolerated.  Obvious and consistent improvements noted with quadriceps strength.  Some progression in squatting from a deficit secondary to patient reports of difficulty with deep squatting.       Plan: Continue per plan of care.      Precautions: Cervical Fusion 23, HTN, Type 2 DM, Chronic LBP, Fall Risk, L2-L3 Laminectomy 10/16/23    Manuals 12/26 12/28 1/2 1/8 1/15 1/17 1/22   Lumbar STM                                        Neuro Re-Ed          TA Brace 10x, 3 sec seated  10x 3 sec seated       TA March                    Standing Rows/Extension 2x20 12.5# 2x20 12.5# 2x20 12.5# 2x20 12.5#  2x20 12.5#              Feet Together Balance 2x30 sec CG 2x30 sec Foam EC 2x30 sec foam EC 2x30 sec foam EC 2x30 sec foam EC 2x30 sec foam EC    Semi-Tandem Stance 2x30 sec CG 2x30 sec EC 2x30 sec EC 2x30 sec EC 2x30 sec EC 2x30 sec EC    Ther Ex          LAQ 30x 7.5# 2x30 10# 2x20 12# 2x20 5# 2x30 15#     Seated Hip FLX                    Standing Hip FLX 2x10 with UE and TA 2x10 with UE and TA 2x10 with UE and TA 2x10 with UE and TA  210 with UE and TA    LP 3x10, 30# 3x10, 35# 3x10 35#   3x10 50#              TM 5 min bilat UE 5 min bilat UE  7 min bilat UE 7 min bilat UE 12 min bilat UE 12 min bilat UE    UBE 3x3   3x3 6x6 6x6    Ther Activity          Mini-Squats 20x bilateral, 10x ea. Staggered from chair with 2 foams 2x15 from chair, staggered x10 ea. 2x15 from chair, staggered x10 ea. 2x15 from chair, staggered x10 ea. 2x15 from chair, staggered x10 ea. 2x15 from chair, 10x from 4\" defecit with UE    Step Ups " "2x10 L 6\" bilat UE FW / 2x10 R 4\" bilat UE FW 2x10 8\" contra UE support / 2x10 4\" no UE 2x10 8\" contra UE support / 2x10 4\" no UE 2x10 8\" contra UE support / 2x10 4\" no UE 2x10 8\" contra UE support / 2x10 6\" no UE 2x10 8\" contra UE support / 2x10 6\" no UE    Step Downs   10x ea. With UE 4\"  10x ea with UE 4\" 10x ea with UE 4\"    Discussion of safety at home, review technique with SPC                    Modalities                                     "

## 2024-01-24 ENCOUNTER — OFFICE VISIT (OUTPATIENT)
Dept: PHYSICAL THERAPY | Facility: CLINIC | Age: 69
End: 2024-01-24
Payer: MEDICARE

## 2024-01-24 DIAGNOSIS — M62.81 MUSCLE WEAKNESS (GENERALIZED): Primary | ICD-10-CM

## 2024-01-24 DIAGNOSIS — R26.2 DIFFICULTY WALKING: ICD-10-CM

## 2024-01-24 DIAGNOSIS — Z98.890 S/P LUMBAR LAMINECTOMY: ICD-10-CM

## 2024-01-24 PROCEDURE — 97112 NEUROMUSCULAR REEDUCATION: CPT | Performed by: PHYSICAL THERAPIST

## 2024-01-24 PROCEDURE — 97530 THERAPEUTIC ACTIVITIES: CPT | Performed by: PHYSICAL THERAPIST

## 2024-01-24 PROCEDURE — 97110 THERAPEUTIC EXERCISES: CPT | Performed by: PHYSICAL THERAPIST

## 2024-01-24 NOTE — PROGRESS NOTES
Daily Note     Today's date: 2024  Patient name: Rachael Marcelo  : 1955  MRN: [id number]  Referring provider: Guido Madera*  Dx:   Encounter Diagnosis     ICD-10-CM    1. Muscle weakness (generalized)  M62.81       2. S/P lumbar laminectomy  Z98.890       3. Difficulty walking  R26.2                      Subjective: Olga feels she is making some progress. Eager start gym program.  Feels appropriate for discharge today.       Objective: See treatment diary below      Assessment: Tolerated treatment well. Patient demonstrated fatigue post treatment. Is appropriate for discharge secondary to consistent and obvious improvmeents in weakness and functional ability.       Plan: Continue HEP with continued follow up only as needed     Precautions: Cervical Fusion 23, HTN, Type 2 DM, Chronic LBP, Fall Risk, L2-L3 Laminectomy 10/16/23    Manuals 12/26 12/28 1/2 1/8 1/15 1/22    Lumbar STM                                        Neuro Re-Ed          TA Brace 10x, 3 sec seated  10x 3 sec seated       TA March                    Standing Rows/Extension 2x20 12.5# 2x20 12.5# 2x20 12.5# 2x20 12.5#  2x20 12.5#              Feet Together Balance 2x30 sec CG 2x30 sec Foam EC 2x30 sec foam EC 2x30 sec foam EC 2x30 sec foam EC 2x30 sec foam EC    Semi-Tandem Stance 2x30 sec CG 2x30 sec EC 2x30 sec EC 2x30 sec EC 2x30 sec EC 2x30 sec EC    Ther Ex          LAQ 30x 7.5# 2x30 10# 2x20 12# 2x20 5# 2x30 15#     Seated Hip FLX                    Standing Hip FLX 2x10 with UE and TA 2x10 with UE and TA 2x10 with UE and TA 2x10 with UE and TA  210 with UE and TA    LP 3x10, 30# 3x10, 35# 3x10 35#   3x10 50#              TM 5 min bilat UE 5 min bilat UE  7 min bilat UE 7 min bilat UE 12 min bilat UE 12 min bilat UE    UBE 3x3   3x3 6x6 6x6    Ther Activity          Mini-Squats 20x bilateral, 10x ea. Staggered from chair with 2 foams 2x15 from chair, staggered x10 ea. 2x15 from chair, staggered x10 ea. 2x15 from chair,  "staggered x10 ea. 2x15 from chair, staggered x10 ea. 2x15 from chair, 10x from 4\" defecit with UE    Step Ups 2x10 L 6\" bilat UE FW / 2x10 R 4\" bilat UE FW 2x10 8\" contra UE support / 2x10 4\" no UE 2x10 8\" contra UE support / 2x10 4\" no UE 2x10 8\" contra UE support / 2x10 4\" no UE 2x10 8\" contra UE support / 2x10 6\" no UE 2x10 8\" contra UE support / 2x10 6\" no UE    Step Downs   10x ea. With UE 4\"  10x ea with UE 4\" 10x ea with UE 4\"    Discussion of safety at home, review technique with SPC                    Modalities                                       "

## 2024-03-18 ENCOUNTER — APPOINTMENT (EMERGENCY)
Dept: RADIOLOGY | Facility: HOSPITAL | Age: 69
End: 2024-03-18
Payer: MEDICARE

## 2024-03-18 ENCOUNTER — HOSPITAL ENCOUNTER (EMERGENCY)
Facility: HOSPITAL | Age: 69
Discharge: LEFT BEFORE DISCHARGE | End: 2024-03-18
Attending: EMERGENCY MEDICINE
Payer: MEDICARE

## 2024-03-18 VITALS
HEART RATE: 91 BPM | RESPIRATION RATE: 18 BRPM | OXYGEN SATURATION: 97 % | TEMPERATURE: 97.6 F | DIASTOLIC BLOOD PRESSURE: 77 MMHG | SYSTOLIC BLOOD PRESSURE: 138 MMHG
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
## 2024-03-18 DIAGNOSIS — M25.551 RIGHT HIP PAIN: ICD-10-CM

## 2024-03-18 DIAGNOSIS — S09.90XA MINOR HEAD INJURY WITHOUT LOSS OF CONSCIOUSNESS, INITIAL ENCOUNTER: Primary | ICD-10-CM

## 2024-03-18 DIAGNOSIS — Z83.3 FAMILY HISTORY OF DIABETES MELLITUS (DM): ICD-10-CM

## 2024-03-18 DIAGNOSIS — M25.521 RIGHT ELBOW PAIN: ICD-10-CM

## 2024-03-18 DIAGNOSIS — Z79.82 ON ASPIRIN AT HOME: ICD-10-CM

## 2024-03-18 LAB — GLUCOSE SERPL-MCNC: 170 MG/DL (ref 65–140)

## 2024-03-18 PROCEDURE — 99284 EMERGENCY DEPT VISIT MOD MDM: CPT | Performed by: EMERGENCY MEDICINE

## 2024-03-18 PROCEDURE — 72125 CT NECK SPINE W/O DYE: CPT

## 2024-03-18 PROCEDURE — 82948 REAGENT STRIP/BLOOD GLUCOSE: CPT

## 2024-03-18 PROCEDURE — 70450 CT HEAD/BRAIN W/O DYE: CPT

## 2024-03-18 PROCEDURE — 99284 EMERGENCY DEPT VISIT MOD MDM: CPT

## 2024-03-18 NOTE — ED ATTENDING ATTESTATION
3/18/2024  I, Acacia Brantley MD, saw and evaluated the patient. I have discussed the patient with the resident/non-physician practitioner and agree with the resident's/non-physician practitioner's findings, Plan of Care, and MDM as documented in the resident's/non-physician practitioner's note, except where noted. All available labs and Radiology studies were reviewed.  I was present for key portions of any procedure(s) performed by the resident/non-physician practitioner and I was immediately available to provide assistance.       At this point I agree with the current assessment done in the Emergency Department.  I have conducted an independent evaluation of this patient a history and physical is as follows:  68-year-old woman here for fall.  Patient stumbled and fell, striking her head and injuring her right elbow.  Patient does take aspirin.  Patient states that she falls frequently.  Has history of cervical laminectomy, has been in physical therapy for gait dysfunction and recurrent falls.  Patient states that she did not have chest pain or shortness of breath preceding the fall.  Review of systems otherwise -12 systems reviewed.  On exam the patient has a small area of erythema to her left forehead consistent with head strike.  Her face is symmetric.  Her pupils are round reactive to light.  She has midline neck tenderness in her lower cervical spine.  She has no chest wall tenderness.  Her heart and lung exams are normal.  She is able to range her shoulder, but has pain in her posterior shoulder and elbow on full extension.  Her abdomen is benign.  Her lower extremities are intact and normal.MEDICAL DECISION MAKING    Number and Complexity of Problems  Differential diagnosis: Unsteady gait, doubt syncope, doubt cardiac dysrhythmia, suspect mechanical fall, elbow injury, doubt fracture, doubt intracranial injury, cervical spine pain, will CT to rule out cervical fracture    Medical Decision Making  Data  External documents reviewed:   My EKG interpretation:   My CT interpretation:   My X-ray interpretation:   My ultrasound interpretation:     TRAUMA - CT head wo contrast   Final Result      No acute intracranial abnormality.                  Workstation performed: [id number]         TRAUMA - CT spine cervical wo contrast   Final Result      No cervical spine fracture or traumatic malalignment.   Stable postop changes status post laminectomy from C3-C7 and posterior fusion from C3-T1.               Workstation performed: [id number]             Labs Reviewed   POCT GLUCOSE - Abnormal       Result Value Ref Range Status    POC Glucose 170 (*) 65 - 140 mg/dl Final       Labs reviewed by me are significant for:     Clinical decision rules/scores are significant for:     Discussed case with:   Considered admission for:     Treatment and Disposition  ED course: Trauma C, will CT  Shared decision making:   Code status:     ED Course         Critical Care Time  Procedures

## 2024-03-18 NOTE — ED PROVIDER NOTES
Emergency Department Trauma Note  Rachael Marcelo 68 y.o. female MRN: [id number]  Unit/Bed#: Z5HB/Z5HB Encounter: [id number]      Trauma Alert: Trauma Acuity: C  Model of Arrival: Mode of Arrival: Other (Comment) via    Trauma Team: Current Providers  Attending Provider: Acacia Brantley MD  Registered Nurse: Steff Estrada RN  Resident: Stanley Florez MD  Registered Nurse: Ariadne Holden  Consultants:     None      History of Present Illness     Chief Complaint:   Chief Complaint   Patient presents with    Fall     See trauma doc     HPI:  Rachael Marcelo is a 68 y.o. female who presents with head injury.  Mechanism:Details of Incident: pt was walking back from cafeteria, felt dizzy,, loosing her footing and fell on right side. +Headstrike, no LOC. Pt c/o right wrist pain Injury Date: 03/18/24 Injury Time: 1641      68-year-old female on daily aspirin arrives as a medical emergency to the ER after fall with head strike from another part of the hospital.  Patient states she has a history of frequent falls.  Today she felt like her blood sugar was low so went to the cafeteria.  On her way back she reports tripping falling to the ground.  Currently porting right elbow pain.  Bystander reports that patient did hit her head but did not lose consciousness.  Patient denies headache, neck pain, visual disturbances, numbness, weakness, dizziness, palpitations, chest pain, abdominal pain, or any other symptoms.     Patient was initially hesitant to be evaluated.  She states she is unable to drive at night and needs to be out of the hospital within 1 hour.  Agrees to initial evaluation and imaging.      Review of Systems   Constitutional:  Negative for chills and fever.   HENT:  Negative for ear pain and sore throat.    Eyes:  Negative for pain and visual disturbance.   Respiratory:  Negative for cough and shortness of breath.    Cardiovascular:  Negative for chest pain and palpitations.   Gastrointestinal:  Negative  for abdominal pain and vomiting.   Genitourinary:  Negative for dysuria and hematuria.   Musculoskeletal:  Positive for arthralgias. Negative for back pain.   Skin:  Negative for color change and rash.   Neurological:  Negative for seizures and syncope.   All other systems reviewed and are negative.      Historical Information     Immunizations:   Immunization History   Administered Date(s) Administered    COVID-19 PFIZER VACCINE 0.3 ML IM 04/27/2021, 05/18/2021    COVID-19 Pfizer Vac BIVALENT Andrey-sucrose 12 Yr+ IM 09/12/2022       Past Medical History:   Diagnosis Date    Diabetes type 2     MI (myocardial infarction) (HCC)     Osteoarthritis        Family History   Problem Relation Age of Onset    Diabetes Mother     Heart attack Father     Hodgkin's lymphoma Father         non hodgkins lymphoma    Other Father         mesothelioma     Past Surgical History:   Procedure Laterality Date    BACK SURGERY      CORONARY ANGIOPLASTY WITH STENT PLACEMENT N/A     LAMINECTOMY      MINIMALLY INVASIVE LUMBAR DECOMPRESSION       Social History     Tobacco Use    Smoking status: Never    Smokeless tobacco: Never     E-Cigarette/Vaping     E-Cigarette/Vaping Substances       Family History: non-contributory    Meds/Allergies   Prior to Admission Medications   Prescriptions Last Dose Informant Patient Reported? Taking?   HYDROmorphone (DILAUDID) 2 mg tablet   No No   Sig: Take 1-2 tablets every 4 -6 hours for moderate to severe pain   acetaminophen (TYLENOL) 325 mg tablet   No No   Sig: Take 3 tablets (975 mg total) by mouth every 8 (eight) hours   allopurinol (ZYLOPRIM) 100 mg tablet   Yes No   Sig: Take 200 mg by mouth daily   atorvastatin (LIPITOR) 40 mg tablet   Yes No   Sig: Take 40 mg by mouth daily   gabapentin (NEURONTIN) 100 mg capsule   No No   Sig: Take 2 capsules (200 mg total) by mouth 2 (two) times a day   glimepiride (AMARYL) 4 mg tablet   Yes No   Sig: Take 8 mg by mouth every morning before breakfast    isosorbide mononitrate (IMDUR) 30 mg 24 hr tablet   Yes No   Sig: Take 30 mg by mouth daily   lisinopril (ZESTRIL) 2.5 mg tablet   Yes No   Sig: Take 2.5 mg by mouth daily   metFORMIN (GLUCOPHAGE) 1000 MG tablet   Yes No   Sig: Take 1,000 mg by mouth daily with dinner   metFORMIN (GLUCOPHAGE) 500 mg tablet   Yes No   Sig: Take 500 mg by mouth daily with breakfast   methocarbamol (ROBAXIN) 500 mg tablet   No No   Sig: Take 1 tablet (500 mg total) by mouth every 6 (six) hours as needed for muscle spasms   metoprolol tartrate (LOPRESSOR) 25 mg tablet   Yes No   Sig: Take 25 mg by mouth every 12 (twelve) hours   oxybutynin (DITROPAN) 5 mg tablet   Yes No   Sig: Take 5 mg by mouth 2 (two) times a day   pantoprazole (PROTONIX) 40 mg tablet   Yes No   Sig: Take 40 mg by mouth daily   pioglitazone (ACTOS) 30 mg tablet   Yes No   Sig: Take 30 mg by mouth daily   polyethylene glycol (MIRALAX) 17 g packet   No No   Sig: Take 17 g by mouth daily Do not start before June 1, 2023.      Facility-Administered Medications: None       Allergies   Allergen Reactions    Latex Rash       PHYSICAL EXAM    PE limited by: none    Objective   Vitals:   First set: Temperature: 97.6 °F (36.4 °C) (03/18/24 1656)  Pulse: 91 (03/18/24 1656)  Respirations: 18 (03/18/24 1656)  Blood Pressure: 138/77 (03/18/24 1656)  SpO2: 97 % (03/18/24 1656)    Primary Survey:   (A) Airway: intact  (B) Breathing: Breath sounds bilaterally  (C) Circulation: Pulses:   normal  (D) Disabliity:  GCS Total:  15  (E) Expose:  Completed    Secondary Survey: (Click on Physical Exam tab above)  Physical Exam  Vitals and nursing note reviewed.   Constitutional:       General: She is not in acute distress.     Appearance: Normal appearance. She is not ill-appearing, toxic-appearing or diaphoretic.   HENT:      Head: Normocephalic.      Comments: Superficial minor contusion left forehead.  No other signs of head trauma.     Right Ear: External ear normal.      Left Ear:  External ear normal.      Nose: Nose normal.      Mouth/Throat:      Mouth: Mucous membranes are moist.      Pharynx: Oropharynx is clear.   Eyes:      General:         Right eye: No discharge.         Left eye: No discharge.      Extraocular Movements: Extraocular movements intact.      Conjunctiva/sclera: Conjunctivae normal.      Pupils: Pupils are equal, round, and reactive to light.   Neck:      Comments: Midline cervical tenderness  Cardiovascular:      Rate and Rhythm: Normal rate and regular rhythm.      Pulses: Normal pulses.      Heart sounds: Normal heart sounds. No murmur heard.     No friction rub.   Pulmonary:      Effort: Pulmonary effort is normal. No respiratory distress.      Breath sounds: Normal breath sounds. No wheezing or rales.   Abdominal:      General: Abdomen is flat. Bowel sounds are normal. There is no distension.      Palpations: Abdomen is soft.      Tenderness: There is no abdominal tenderness. There is no guarding.   Musculoskeletal:         General: Tenderness present. Normal range of motion.      Cervical back: Normal range of motion and neck supple. Tenderness present. No rigidity.      Comments: Tender over right distal humerus.  No deformity or edema.   Skin:     General: Skin is warm and dry.      Capillary Refill: Capillary refill takes less than 2 seconds.      Coloration: Skin is not pale.   Neurological:      Mental Status: She is alert and oriented to person, place, and time.      Sensory: No sensory deficit.      Motor: No weakness.      Gait: Gait normal.   Psychiatric:         Mood and Affect: Mood normal.         Behavior: Behavior normal.         Cervical spine cleared by clinical criteria? No (imaging required)      Invasive Devices       None                   Lab Results:   Results Reviewed       Procedure Component Value Units Date/Time    Fingerstick Glucose (POCT) [[id number]]  (Abnormal) Collected: 03/18/24 9303    Lab Status: Final result Specimen: Blood  Updated: 03/18/24 1716     POC Glucose 170 mg/dl                    Imaging Studies:   Direct to CT: Yes  TRAUMA - CT head wo contrast    (Results Pending)   TRAUMA - CT spine cervical wo contrast    (Results Pending)   XR elbow 3+ vw RIGHT    (Results Pending)   XR hip/pelv 2-3 vws right if performed    (Results Pending)         Procedures  Procedures         ED Course           Medical Decision Making  Level C for fall on daily aspirin.  Cause of fall possibly due to hypoglycemia versus mechanical.  No syncope.  Will evaluate with CT of head to rule out subdural hemorrhage, CT cervical spine to rule out acute cervical fracture, x-rays to assess for fracture, and POC glucose assess for hypoglycemia.    The patient insists on leaving against medical advice, despite my recommendation to remain for ongoing treatment.    1: Capacity: I have determined that the patient has capacity to make the decision to leave against medical advice based on the following:    A. Ability to express a choice: The patient is able to express his or her choice and communicate that choice.   B. Ability to understand relevant information: The patient is able to verbalize their diagnosis, understand information about the purpose of treatment, remember the information, and show that he or she can be part of the decision-making process.    C. Ability to appreciate the significance of the information and its consequences: The patient understands the consequences of treatment refusal and the risks and benefits of accepting or refusing treatment.    D. Ability to manipulate information: The patient is able to engage in reasoning as it applies to making treatment decisions   2: Psychiatric Consultation: There is not an indication to call psychiatry consultation to determine capacity    3. Alternative Treatment: I have discussed the recommended course of treatment and available alternatives.   4. Risks: I have discussed the specific risks of that  patient refusing treatment    5. Follow-up Care: I have discussed the follow-up care and advised to see patient's PCP immediately or return here for worsening.   6. ED Option: I have emphasized that the patient has the option to return to the ED. Reviewed that we can have continuation of the workup at any time and that we are always open.      Amount and/or Complexity of Data Reviewed  Labs: ordered.  Radiology: ordered.                Disposition  [facility] Transfer: No  Final diagnoses:   Fall from standing, initial encounter   Family history of diabetes mellitus (DM)   On aspirin at home   Minor head injury without loss of consciousness, initial encounter   Right hip pain   Right elbow pain     Time reflects when diagnosis was documented in both MDM as applicable and the Disposition within this note       Time User Action Codes Description Comment    3/18/2024  5:54 PM Stanley Florez [W19.XXXA] Fall from standing, initial encounter     3/18/2024  5:54 PM Stanley Florez [Z83.3] Family history of diabetes mellitus (DM)     3/18/2024  5:55 PM Stanley Florez [Z79.82] On aspirin at home     3/18/2024  5:55 PM Stanley Florez [S09.90XA] Minor head injury without loss of consciousness, initial encounter     3/18/2024  5:55 PM Stanley Florez [M25.551] Right hip pain     3/18/2024  5:55 PM Stanley Florez [M25.521] Right elbow pain     3/18/2024  5:55 PM Stanley Florez [W19.XXXA] Fall from standing, initial encounter     3/18/2024  5:55 PM Stanley Florez [S09.90XA] Minor head injury without loss of consciousness, initial encounter           ED Disposition       ED Disposition   AMA    Condition   --    Date/Time   Mon Mar 18, 2024  5:55 PM    Comment   Date: 3/18/2024  Patient: Rachael Marcelo  Admitted: 3/18/2024  4:49 PM  Attending Provider: Acacia Brantley MD    Rachael Marcelo or her authorized caregiver has made the decision for the patient to leave the emergency department against the advice   of the emergency department staff. She or her authorized caregiver has been informed and understands the inherent risks, including death, coma, fracture.  She or her authorized caregiver has decided to accept the responsibility for this decision. Id sanjuanita Marcelo and all necessary parties have been advised that she may return for further evaluation or treatment. Her condition at time of discharge was stable.  Rachael Marcelo had current vital signs as follows:  /77   Pulse 91   Temp 97.6 °F  (36.4 °C) (Oral)   Resp 18                Follow-up Information    None       Patient's Medications   Discharge Prescriptions    No medications on file     No discharge procedures on file.    PDMP Review         Value Time User    PDMP Reviewed  Yes 5/25/2023 11:28 AM Drake Martinez PA-C            ED Provider  Electronically Signed by           Stanley Florez MD  03/18/24 5219

## 2024-04-10 ENCOUNTER — HOSPITAL ENCOUNTER (OUTPATIENT)
Dept: HOSPITAL 99 - WDC | Age: 69
End: 2024-04-10
Payer: MEDICARE

## 2024-04-10 DIAGNOSIS — Z12.31: Primary | ICD-10-CM

## 2024-08-27 ENCOUNTER — HOSPITAL ENCOUNTER (EMERGENCY)
Dept: HOSPITAL 99 - EMR | Age: 69
Discharge: HOME | End: 2024-08-27
Payer: MEDICARE

## 2024-08-27 VITALS — SYSTOLIC BLOOD PRESSURE: 116 MMHG | RESPIRATION RATE: 16 BRPM | DIASTOLIC BLOOD PRESSURE: 74 MMHG

## 2024-08-27 VITALS — BODY MASS INDEX: 38.5 KG/M2

## 2024-08-27 VITALS — SYSTOLIC BLOOD PRESSURE: 130 MMHG | DIASTOLIC BLOOD PRESSURE: 104 MMHG | RESPIRATION RATE: 20 BRPM

## 2024-08-27 VITALS — RESPIRATION RATE: 16 BRPM | DIASTOLIC BLOOD PRESSURE: 77 MMHG | SYSTOLIC BLOOD PRESSURE: 119 MMHG
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
## 2024-08-27 DIAGNOSIS — I10: ICD-10-CM

## 2024-08-27 DIAGNOSIS — R10.9: Primary | ICD-10-CM

## 2024-08-27 DIAGNOSIS — E11.9: ICD-10-CM

## 2024-08-27 DIAGNOSIS — T18.4XXA: ICD-10-CM

## 2024-08-27 DIAGNOSIS — E78.00: ICD-10-CM

## 2024-08-27 DIAGNOSIS — N20.2: ICD-10-CM

## 2024-08-27 LAB
ALBUMIN SERPL-MCNC: 4.1 G/DL (ref 3.5–5)
ALP SERPL-CCNC: 116 U/L (ref 38–126)
ALT SERPL-CCNC: 19 U/L (ref 0–35)
APAP SERPL-MCNC: < 10 UG/ML (ref 10–30)
AST SERPL-CCNC: 25 U/L (ref 14–36)
BUN SERPL-MCNC: 21 MG/DL (ref 7–17)
CALCIUM SERPL-MCNC: 10 MG/DL (ref 8.4–10.2)
CHLORIDE SERPL-SCNC: 103 MMOL/L (ref 98–107)
CO2 SERPL-SCNC: 30 MMOL/L (ref 22–30)
EGFR: 49.61
ERYTHROCYTE [DISTWIDTH] IN BLOOD BY AUTOMATED COUNT: 14.3 % (ref 11.5–14.5)
GLUCOSE SERPL-MCNC: 139 MG/DL (ref 70–99)
HCT VFR BLD AUTO: 32.4 % (ref 37–47)
HGB BLD-MCNC: 10.9 G/DL (ref 12–16)
MCHC RBC AUTO-ENTMCNC: 33.6 G/DL (ref 33–37)
MCV RBC AUTO: 85.7 FL (ref 81–99)
NRBC BLD AUTO-RTO: 0 %
PLATELET # BLD AUTO: 197 10^3/UL (ref 130–400)
POTASSIUM SERPL-SCNC: 4.3 MMOL/L (ref 3.5–5.1)
PROT SERPL-MCNC: 6.4 G/DL (ref 6.3–8.2)
SALICYLATES SERPL-MCNC: < 1 MG/DL (ref 2–20)
SODIUM SERPL-SCNC: 143 MMOL/L (ref 135–145)

## 2024-08-27 PROCEDURE — 99284 EMERGENCY DEPT VISIT MOD MDM: CPT

## 2024-08-27 RX ADMIN — ACETAMINOPHEN 1000 MG: 500 TABLET ORAL at 17:48

## 2024-08-30 ENCOUNTER — HOSPITAL ENCOUNTER (OUTPATIENT)
Dept: HOSPITAL 99 - REG | Age: 69
End: 2024-08-30
Payer: MEDICARE

## 2024-08-30 DIAGNOSIS — N20.1: Primary | ICD-10-CM

## 2024-08-30 DIAGNOSIS — N20.0: ICD-10-CM

## 2024-08-30 LAB — CALCIUM SERPL-MCNC: 10.2 MG/DL (ref 8.4–10.2)

## 2024-09-13 ENCOUNTER — HOSPITAL ENCOUNTER (OUTPATIENT)
Dept: HOSPITAL 99 - RAD | Age: 69
End: 2024-09-13
Payer: MEDICARE

## 2024-09-13 DIAGNOSIS — N20.0: Primary | ICD-10-CM

## 2024-10-09 NOTE — ASSESSMENT & PLAN NOTE
Hgb 9 3 on admission, potentially related to recent surgery  · monitor CBC, slight decrease, probably related to IVF Requested medication(s) are due for refill today: Yes  Patient has already received a courtesy refill: No  Other reason request has been forwarded to provider:

## 2025-02-11 ENCOUNTER — HOSPITAL ENCOUNTER (EMERGENCY)
Dept: HOSPITAL 99 - EMR | Age: 70
Discharge: HOME | End: 2025-02-11
Payer: MEDICARE

## 2025-02-11 VITALS — SYSTOLIC BLOOD PRESSURE: 100 MMHG | DIASTOLIC BLOOD PRESSURE: 52 MMHG

## 2025-02-11 VITALS — DIASTOLIC BLOOD PRESSURE: 86 MMHG | SYSTOLIC BLOOD PRESSURE: 143 MMHG | RESPIRATION RATE: 18 BRPM

## 2025-02-11 VITALS — BODY MASS INDEX: 40.4 KG/M2

## 2025-02-11 VITALS — SYSTOLIC BLOOD PRESSURE: 122 MMHG | DIASTOLIC BLOOD PRESSURE: 60 MMHG

## 2025-02-11 DIAGNOSIS — N13.2: Primary | ICD-10-CM

## 2025-02-11 LAB
ALBUMIN SERPL-MCNC: 3.7 G/DL (ref 3.5–5)
ALP SERPL-CCNC: 125 U/L (ref 38–126)
ALT SERPL-CCNC: 17 U/L (ref 0–35)
AST SERPL-CCNC: 21 U/L (ref 14–36)
BUN SERPL-MCNC: 26 MG/DL (ref 7–17)
CALCIUM SERPL-MCNC: 9.2 MG/DL (ref 8.4–10.2)
CHLORIDE SERPL-SCNC: 101 MMOL/L (ref 98–107)
CO2 SERPL-SCNC: 31 MMOL/L (ref 22–30)
EGFR: 44.79
ERYTHROCYTE [DISTWIDTH] IN BLOOD BY AUTOMATED COUNT: 14.5 % (ref 11.5–14.5)
ESTIMATED CREATININE CLEARANCE: 49 ML/MIN
GLUCOSE SERPL-MCNC: 185 MG/DL (ref 70–99)
HCT VFR BLD AUTO: 34.6 % (ref 37–47)
HGB BLD-MCNC: 11.1 G/DL (ref 12–16)
MCHC RBC AUTO-ENTMCNC: 32.1 G/DL (ref 33–37)
MCV RBC AUTO: 90.1 FL (ref 81–99)
NRBC BLD AUTO-RTO: 0 %
PLATELET # BLD AUTO: 199 10^3/UL (ref 130–400)
POTASSIUM SERPL-SCNC: 4 MMOL/L (ref 3.5–5.1)
PROT SERPL-MCNC: 6 G/DL (ref 6.3–8.2)
SODIUM SERPL-SCNC: 139 MMOL/L (ref 135–145)
SQUAMOUS URNS QL MICRO: >30 /LPF
TRANS CELLS UR QL COMP ASSIST: (no result) /LPF
URINE RED BLOOD CELL: >100 /HPF (ref 0–2)
URINE WHITE CELL: (no result) /HPF (ref 0–5)

## 2025-02-11 PROCEDURE — 96374 THER/PROPH/DIAG INJ IV PUSH: CPT

## 2025-02-11 PROCEDURE — 96375 TX/PRO/DX INJ NEW DRUG ADDON: CPT

## 2025-02-11 PROCEDURE — 96361 HYDRATE IV INFUSION ADD-ON: CPT

## 2025-02-11 PROCEDURE — 99284 EMERGENCY DEPT VISIT MOD MDM: CPT

## 2025-02-11 RX ADMIN — SODIUM CHLORIDE 1000: 900 INJECTION, SOLUTION INTRAVENOUS at 08:53

## 2025-02-11 RX ADMIN — KETOROLAC TROMETHAMINE 15 MG: 15 INJECTION, SOLUTION INTRAMUSCULAR; INTRAVENOUS at 08:53

## 2025-02-11 RX ADMIN — ONDANSETRON HYDROCHLORIDE 4 MG: 2 SOLUTION INTRAMUSCULAR; INTRAVENOUS at 08:53

## 2025-04-15 ENCOUNTER — HOSPITAL ENCOUNTER (OUTPATIENT)
Dept: HOSPITAL 99 - WDC | Age: 70
End: 2025-04-15
Payer: MEDICARE

## 2025-04-15 DIAGNOSIS — Z12.31: Primary | ICD-10-CM

## 2025-04-18 ENCOUNTER — HOSPITAL ENCOUNTER (OUTPATIENT)
Dept: HOSPITAL 99 - MRI 3T | Age: 70
End: 2025-04-18
Payer: MEDICARE

## 2025-04-18 DIAGNOSIS — M54.16: Primary | ICD-10-CM

## 2025-05-24 ENCOUNTER — HOSPITAL ENCOUNTER (EMERGENCY)
Dept: HOSPITAL 99 - EMR | Age: 70
Discharge: HOME | End: 2025-05-24
Payer: MEDICARE

## 2025-05-24 VITALS — DIASTOLIC BLOOD PRESSURE: 72 MMHG | SYSTOLIC BLOOD PRESSURE: 129 MMHG

## 2025-05-24 VITALS — DIASTOLIC BLOOD PRESSURE: 56 MMHG | SYSTOLIC BLOOD PRESSURE: 111 MMHG

## 2025-05-24 VITALS — BODY MASS INDEX: 41.4 KG/M2 | SYSTOLIC BLOOD PRESSURE: 146 MMHG | DIASTOLIC BLOOD PRESSURE: 65 MMHG

## 2025-05-24 VITALS — DIASTOLIC BLOOD PRESSURE: 67 MMHG | SYSTOLIC BLOOD PRESSURE: 115 MMHG

## 2025-05-24 VITALS — DIASTOLIC BLOOD PRESSURE: 73 MMHG | SYSTOLIC BLOOD PRESSURE: 128 MMHG

## 2025-05-24 VITALS — SYSTOLIC BLOOD PRESSURE: 150 MMHG | DIASTOLIC BLOOD PRESSURE: 79 MMHG

## 2025-05-24 VITALS — SYSTOLIC BLOOD PRESSURE: 111 MMHG | DIASTOLIC BLOOD PRESSURE: 56 MMHG

## 2025-05-24 DIAGNOSIS — E78.00: ICD-10-CM

## 2025-05-24 DIAGNOSIS — K21.9: ICD-10-CM

## 2025-05-24 DIAGNOSIS — Z79.84: ICD-10-CM

## 2025-05-24 DIAGNOSIS — Z88.5: ICD-10-CM

## 2025-05-24 DIAGNOSIS — I10: ICD-10-CM

## 2025-05-24 DIAGNOSIS — Z91.040: ICD-10-CM

## 2025-05-24 DIAGNOSIS — R07.89: Primary | ICD-10-CM

## 2025-05-24 DIAGNOSIS — E11.9: ICD-10-CM

## 2025-05-24 DIAGNOSIS — Z79.82: ICD-10-CM

## 2025-05-24 DIAGNOSIS — R10.13: ICD-10-CM

## 2025-05-24 LAB
ALBUMIN SERPL-MCNC: 4.2 G/DL (ref 3.5–5)
ALP SERPL-CCNC: 105 U/L (ref 38–126)
ALT SERPL-CCNC: 15 U/L (ref 0–35)
AST SERPL-CCNC: 20 U/L (ref 14–36)
BUN SERPL-MCNC: 28 MG/DL (ref 7–17)
CALCIUM SERPL-MCNC: 9.6 MG/DL (ref 8.4–10.2)
CHLORIDE SERPL-SCNC: 105 MMOL/L (ref 98–107)
CO2 SERPL-SCNC: 26 MMOL/L (ref 22–30)
EGFR: 40.98
ERYTHROCYTE [DISTWIDTH] IN BLOOD BY AUTOMATED COUNT: 15.4 % (ref 11.5–14.5)
GLUCOSE SERPL-MCNC: 158 MG/DL (ref 70–99)
HCT VFR BLD AUTO: 33.2 % (ref 37–47)
HGB BLD-MCNC: 10.8 G/DL (ref 12–16)
MCHC RBC AUTO-ENTMCNC: 32.5 G/DL (ref 33–37)
MCV RBC AUTO: 89.7 FL (ref 81–99)
NRBC BLD AUTO-RTO: 0 %
PLATELET # BLD AUTO: 187 10^3/UL (ref 130–400)
POTASSIUM SERPL-SCNC: 4.8 MMOL/L (ref 3.5–5.1)
PROT SERPL-MCNC: 6.5 G/DL (ref 6.3–8.2)
SODIUM SERPL-SCNC: 141 MMOL/L (ref 135–145)
TROPONIN I SERPL-MCNC: < 0.012 NG/ML
TROPONIN I SERPL-MCNC: < 0.012 NG/ML

## 2025-05-24 PROCEDURE — 99284 EMERGENCY DEPT VISIT MOD MDM: CPT

## 2025-05-24 RX ADMIN — ALUMINUM HYDROXIDE, MAGNESIUM HYDROXIDE, AND SIMETHICONE 50: 200; 200; 20 SUSPENSION ORAL at 18:51

## 2025-05-24 RX ADMIN — NITROGLYCERIN 0.4 MG: 0.4 TABLET SUBLINGUAL at 17:18

## 2025-05-24 RX ADMIN — ASPIRIN 324 MG: 81 TABLET, CHEWABLE ORAL at 17:20

## 2025-07-17 ENCOUNTER — HOSPITAL ENCOUNTER (OUTPATIENT)
Dept: HOSPITAL 99 - RCS | Age: 70
End: 2025-07-17
Payer: MEDICARE

## 2025-07-17 DIAGNOSIS — I25.118: Primary | ICD-10-CM

## 2025-07-17 DIAGNOSIS — I10: ICD-10-CM

## 2025-07-17 DIAGNOSIS — E78.00: ICD-10-CM

## 2025-07-17 DIAGNOSIS — I20.1: ICD-10-CM

## 2025-07-17 LAB
CHOLEST SERPL-MCNC: 141 MG/DL (ref 50–199)
HDLC SERPL-MCNC: 51 MG/DL
LDLC SERPL CALC-MCNC: 65 MG/DL
TRIGL SERPL-MCNC: 126 MG/DL (ref 10–149)
TSH SERPL DL<=0.05 MIU/L-ACNC: 0.14 UIU/ML (ref 0.47–4.68)
VLDLC SERPL CALC-MCNC: 25 MG/DL (ref 0–30)

## 2025-07-17 PROCEDURE — A9500 TC99M SESTAMIBI: HCPCS

## 2025-07-17 RX ADMIN — AMINOPHYLLINE 75 MG: 25 INJECTION, SOLUTION INTRAVENOUS at 08:42

## 2025-07-17 RX ADMIN — REGADENOSON 0.4 MG: 0.08 INJECTION, SOLUTION INTRAVENOUS at 08:30
